# Patient Record
Sex: FEMALE | Race: WHITE
[De-identification: names, ages, dates, MRNs, and addresses within clinical notes are randomized per-mention and may not be internally consistent; named-entity substitution may affect disease eponyms.]

---

## 2023-08-01 ENCOUNTER — NON-APPOINTMENT (OUTPATIENT)
Age: 73
End: 2023-08-01

## 2023-08-03 ENCOUNTER — APPOINTMENT (OUTPATIENT)
Dept: ORTHOPEDIC SURGERY | Facility: CLINIC | Age: 73
End: 2023-08-03
Payer: MEDICARE

## 2023-08-03 ENCOUNTER — NON-APPOINTMENT (OUTPATIENT)
Age: 73
End: 2023-08-03

## 2023-08-03 VITALS
SYSTOLIC BLOOD PRESSURE: 181 MMHG | OXYGEN SATURATION: 97 % | BODY MASS INDEX: 26.12 KG/M2 | DIASTOLIC BLOOD PRESSURE: 63 MMHG | WEIGHT: 153 LBS | HEART RATE: 63 BPM | TEMPERATURE: 97.5 F | HEIGHT: 64 IN

## 2023-08-03 PROCEDURE — 99203 OFFICE O/P NEW LOW 30 MIN: CPT | Mod: 25

## 2023-08-03 PROCEDURE — 27197 CLSD TX PELVIC RING FX: CPT

## 2023-08-03 RX ORDER — MELOXICAM 15 MG/1
15 TABLET ORAL
Qty: 30 | Refills: 0 | Status: ACTIVE | COMMUNITY
Start: 2023-08-03 | End: 1900-01-01

## 2023-08-03 NOTE — PHYSICAL EXAM
[de-identified] : The patient is sitting comfortably in the exam room.  RIGHT hip -Skin is intact, no swelling, no ecchymosis -No tenderness to palpation over the greater trochanter -Painless range of motion hip -Flexion: 120, Internal rotation: 30, External rotation: 45 -No tenderness to palpation around the SI joints posteriorly, no back pain -Sensation is intact L1-S1 -5/5 EHL, FHL, TA, GS, quadriceps, hamstrings -Foot is warm and well-perfused, palpable dorsalis pedis pulse  [de-identified] : Xrays of the bilateral hips were taken at [facility] on 7/31/23. Impression: Nondisplaced fractures of the right superior and inferior pubic rami, Intact femurs bilaterally.  Electronically signed by Tommie Johnson MD 7/31/23 7:34pm

## 2023-08-03 NOTE — DISCUSSION/SUMMARY
[de-identified] : 73-year-old woman with a right inferior and superior pelvic rami fractures, approximately 2 weeks out.  -The risks and benefits of operative versus nonoperative management were discussed at length with the patient. The patient shows a good understanding of the injury and treatment options. They would like to move forward with nonoperative management.  -WBAT RLE -Meloxicam for pain -If pain persists by her next visit, we can order an CT scan of pelvis to assess fracture -Follow-up in 3 weeks with x-rays of the pelvis at that time.  -All of the patient's questions and concerns were addressed. 
general

## 2023-08-03 NOTE — HISTORY OF PRESENT ILLNESS
[de-identified] : Ms. KORIN FUENTES is a 73 year old woman presents today for initial evaluation of right pelvic pain that started on about 7/18/23. She had seen her PCP Dr. Staples who ordered x-rays on 7/31/23. She denies any injury/trauma. She notes right groin pain, worse with walking. She is ambulating with a walker since the injury.

## 2023-08-24 ENCOUNTER — APPOINTMENT (OUTPATIENT)
Dept: ORTHOPEDIC SURGERY | Facility: CLINIC | Age: 73
End: 2023-08-24
Payer: MEDICARE

## 2023-08-24 DIAGNOSIS — S32.591S OTHER SPECIFIED FRACTURE OF RIGHT PUBIS, SEQUELA: ICD-10-CM

## 2023-08-24 PROCEDURE — 99213 OFFICE O/P EST LOW 20 MIN: CPT

## 2023-08-24 PROCEDURE — 72190 X-RAY EXAM OF PELVIS: CPT

## 2023-08-24 RX ORDER — MELOXICAM 15 MG/1
15 TABLET ORAL
Qty: 30 | Refills: 0 | Status: ACTIVE | COMMUNITY
Start: 2023-08-24 | End: 1900-01-01

## 2023-08-24 NOTE — DISCUSSION/SUMMARY
[de-identified] : 73-year-old woman with a right inferior and superior pelvic rami fractures, approximately 6 weeks out, treated nonoperatively. -X-ray and physical exam findings were discussed with the patient -WBAT lateral lower extremities -Meloxicam for pain -Continue physical therapy -Follow-up in 2 months with x-rays of the pelvis at that time.  -All of the patient's questions and concerns were addressed.

## 2023-08-24 NOTE — PHYSICAL EXAM
[de-identified] : The patient is sitting comfortably in the exam room.  RIGHT hip -Skin is intact, no swelling, no ecchymosis -No tenderness to palpation over the greater trochanter -Painless range of motion hip -Flexion: 120, Internal rotation: 30, External rotation: 45 -No tenderness to palpation around the SI joints posteriorly, no back pain -Sensation is intact L1-S1 -5/5 EHL, FHL, TA, GS, quadriceps, hamstrings -Foot is warm and well-perfused, palpable dorsalis pedis pulse  [de-identified] : Xrays of the pelvis were taken in the office today, 8/24/23 AP, inlet, outlet, Judet a views.  X-rays show good overall alignment of the pelvis.  There is interval callus formation at the fracture sites on the right inferior and superior rami.  No new fractures or dislocations pubic symphysis is well aligned and bilateral sacroiliac joints are well aligned.

## 2023-08-24 NOTE — HISTORY OF PRESENT ILLNESS
[de-identified] : Ms. KORIN FUENTES is a 73 year old woman presents today for follow up evaluation of right inferior and superior pelvic rami fractures sustained on 7/18/23, being treated nonoperatively. Since her last visit she states she is feeling much better.  Her pain has significantly improved and she is ambulating with a walker.  She is able to ambulate with a cane at home.  She is very happy with her improvement.

## 2023-09-07 ENCOUNTER — INPATIENT (INPATIENT)
Facility: HOSPITAL | Age: 73
LOS: 13 days | Discharge: NURSING, REHAB OR LONG-TERM CARE | DRG: 542 | End: 2023-09-21
Attending: INTERNAL MEDICINE | Admitting: INTERNAL MEDICINE
Payer: MEDICARE

## 2023-09-07 VITALS
HEART RATE: 67 BPM | WEIGHT: 139.99 LBS | SYSTOLIC BLOOD PRESSURE: 179 MMHG | RESPIRATION RATE: 16 BRPM | TEMPERATURE: 98 F | DIASTOLIC BLOOD PRESSURE: 77 MMHG | OXYGEN SATURATION: 100 % | HEIGHT: 64 IN

## 2023-09-07 DIAGNOSIS — N17.9 ACUTE KIDNEY FAILURE, UNSPECIFIED: ICD-10-CM

## 2023-09-07 LAB
ALBUMIN SERPL ELPH-MCNC: 5 G/DL — SIGNIFICANT CHANGE UP (ref 3.3–5)
ALP SERPL-CCNC: 120 U/L — SIGNIFICANT CHANGE UP (ref 40–120)
ALT FLD-CCNC: 16 U/L — SIGNIFICANT CHANGE UP (ref 10–45)
ANION GAP SERPL CALC-SCNC: 20 MMOL/L — HIGH (ref 5–17)
AST SERPL-CCNC: 22 U/L — SIGNIFICANT CHANGE UP (ref 10–40)
BASOPHILS # BLD AUTO: 0.04 K/UL — SIGNIFICANT CHANGE UP (ref 0–0.2)
BASOPHILS NFR BLD AUTO: 0.3 % — SIGNIFICANT CHANGE UP (ref 0–2)
BILIRUB SERPL-MCNC: 0.4 MG/DL — SIGNIFICANT CHANGE UP (ref 0.2–1.2)
BUN SERPL-MCNC: 32 MG/DL — HIGH (ref 7–23)
CALCIUM SERPL-MCNC: 11.4 MG/DL — HIGH (ref 8.4–10.5)
CHLORIDE SERPL-SCNC: 90 MMOL/L — LOW (ref 96–108)
CO2 SERPL-SCNC: 19 MMOL/L — LOW (ref 22–31)
CREAT SERPL-MCNC: 1.43 MG/DL — HIGH (ref 0.5–1.3)
EGFR: 38 ML/MIN/1.73M2 — LOW
EOSINOPHIL # BLD AUTO: 0.04 K/UL — SIGNIFICANT CHANGE UP (ref 0–0.5)
EOSINOPHIL NFR BLD AUTO: 0.3 % — SIGNIFICANT CHANGE UP (ref 0–6)
GLUCOSE BLDC GLUCOMTR-MCNC: 167 MG/DL — HIGH (ref 70–99)
GLUCOSE SERPL-MCNC: 90 MG/DL — SIGNIFICANT CHANGE UP (ref 70–99)
HCT VFR BLD CALC: 36.2 % — SIGNIFICANT CHANGE UP (ref 34.5–45)
HGB BLD-MCNC: 11.8 G/DL — SIGNIFICANT CHANGE UP (ref 11.5–15.5)
IMM GRANULOCYTES NFR BLD AUTO: 0.6 % — SIGNIFICANT CHANGE UP (ref 0–0.9)
LYMPHOCYTES # BLD AUTO: 1.85 K/UL — SIGNIFICANT CHANGE UP (ref 1–3.3)
LYMPHOCYTES # BLD AUTO: 12.4 % — LOW (ref 13–44)
MCHC RBC-ENTMCNC: 30.8 PG — SIGNIFICANT CHANGE UP (ref 27–34)
MCHC RBC-ENTMCNC: 32.6 GM/DL — SIGNIFICANT CHANGE UP (ref 32–36)
MCV RBC AUTO: 94.5 FL — SIGNIFICANT CHANGE UP (ref 80–100)
MONOCYTES # BLD AUTO: 0.85 K/UL — SIGNIFICANT CHANGE UP (ref 0–0.9)
MONOCYTES NFR BLD AUTO: 5.7 % — SIGNIFICANT CHANGE UP (ref 2–14)
NEUTROPHILS # BLD AUTO: 11.99 K/UL — HIGH (ref 1.8–7.4)
NEUTROPHILS NFR BLD AUTO: 80.7 % — HIGH (ref 43–77)
NRBC # BLD: 0 /100 WBCS — SIGNIFICANT CHANGE UP (ref 0–0)
PLATELET # BLD AUTO: 429 K/UL — HIGH (ref 150–400)
POTASSIUM SERPL-MCNC: 3.9 MMOL/L — SIGNIFICANT CHANGE UP (ref 3.5–5.3)
POTASSIUM SERPL-SCNC: 3.9 MMOL/L — SIGNIFICANT CHANGE UP (ref 3.5–5.3)
PROT SERPL-MCNC: 8.6 G/DL — HIGH (ref 6–8.3)
RBC # BLD: 3.83 M/UL — SIGNIFICANT CHANGE UP (ref 3.8–5.2)
RBC # FLD: 15 % — HIGH (ref 10.3–14.5)
SODIUM SERPL-SCNC: 129 MMOL/L — LOW (ref 135–145)
WBC # BLD: 14.86 K/UL — HIGH (ref 3.8–10.5)
WBC # FLD AUTO: 14.86 K/UL — HIGH (ref 3.8–10.5)

## 2023-09-07 PROCEDURE — 74177 CT ABD & PELVIS W/CONTRAST: CPT | Mod: 26,MA

## 2023-09-07 PROCEDURE — 72131 CT LUMBAR SPINE W/O DYE: CPT | Mod: 26,MA

## 2023-09-07 PROCEDURE — 71045 X-RAY EXAM CHEST 1 VIEW: CPT | Mod: 26

## 2023-09-07 PROCEDURE — 99285 EMERGENCY DEPT VISIT HI MDM: CPT | Mod: FS

## 2023-09-07 RX ORDER — LIDOCAINE 4 G/100G
1 CREAM TOPICAL ONCE
Refills: 0 | Status: COMPLETED | OUTPATIENT
Start: 2023-09-07 | End: 2023-09-07

## 2023-09-07 RX ORDER — INSULIN LISPRO 100/ML
VIAL (ML) SUBCUTANEOUS AT BEDTIME
Refills: 0 | Status: DISCONTINUED | OUTPATIENT
Start: 2023-09-07 | End: 2023-09-21

## 2023-09-07 RX ORDER — ACETAMINOPHEN 500 MG
650 TABLET ORAL EVERY 6 HOURS
Refills: 0 | Status: DISCONTINUED | OUTPATIENT
Start: 2023-09-07 | End: 2023-09-21

## 2023-09-07 RX ORDER — SODIUM CHLORIDE 9 MG/ML
1000 INJECTION, SOLUTION INTRAVENOUS
Refills: 0 | Status: DISCONTINUED | OUTPATIENT
Start: 2023-09-07 | End: 2023-09-21

## 2023-09-07 RX ORDER — SPIRONOLACTONE 25 MG/1
50 TABLET, FILM COATED ORAL DAILY
Refills: 0 | Status: DISCONTINUED | OUTPATIENT
Start: 2023-09-07 | End: 2023-09-11

## 2023-09-07 RX ORDER — DEXTROSE 50 % IN WATER 50 %
25 SYRINGE (ML) INTRAVENOUS ONCE
Refills: 0 | Status: DISCONTINUED | OUTPATIENT
Start: 2023-09-07 | End: 2023-09-21

## 2023-09-07 RX ORDER — LEVOTHYROXINE SODIUM 125 MCG
75 TABLET ORAL
Refills: 0 | Status: DISCONTINUED | OUTPATIENT
Start: 2023-09-07 | End: 2023-09-21

## 2023-09-07 RX ORDER — ENOXAPARIN SODIUM 100 MG/ML
40 INJECTION SUBCUTANEOUS EVERY 24 HOURS
Refills: 0 | Status: DISCONTINUED | OUTPATIENT
Start: 2023-09-07 | End: 2023-09-11

## 2023-09-07 RX ORDER — ASPIRIN/CALCIUM CARB/MAGNESIUM 324 MG
325 TABLET ORAL DAILY
Refills: 0 | Status: DISCONTINUED | OUTPATIENT
Start: 2023-09-07 | End: 2023-09-21

## 2023-09-07 RX ORDER — ALLOPURINOL 300 MG
300 TABLET ORAL DAILY
Refills: 0 | Status: DISCONTINUED | OUTPATIENT
Start: 2023-09-07 | End: 2023-09-21

## 2023-09-07 RX ORDER — ACETAMINOPHEN 500 MG
975 TABLET ORAL ONCE
Refills: 0 | Status: COMPLETED | OUTPATIENT
Start: 2023-09-07 | End: 2023-09-07

## 2023-09-07 RX ORDER — OXYCODONE AND ACETAMINOPHEN 5; 325 MG/1; MG/1
1 TABLET ORAL EVERY 6 HOURS
Refills: 0 | Status: DISCONTINUED | OUTPATIENT
Start: 2023-09-07 | End: 2023-09-12

## 2023-09-07 RX ORDER — GLUCAGON INJECTION, SOLUTION 0.5 MG/.1ML
1 INJECTION, SOLUTION SUBCUTANEOUS ONCE
Refills: 0 | Status: DISCONTINUED | OUTPATIENT
Start: 2023-09-07 | End: 2023-09-21

## 2023-09-07 RX ORDER — DEXTROSE 50 % IN WATER 50 %
15 SYRINGE (ML) INTRAVENOUS ONCE
Refills: 0 | Status: DISCONTINUED | OUTPATIENT
Start: 2023-09-07 | End: 2023-09-21

## 2023-09-07 RX ORDER — LEVOTHYROXINE SODIUM 125 MCG
50 TABLET ORAL
Refills: 0 | Status: DISCONTINUED | OUTPATIENT
Start: 2023-09-07 | End: 2023-09-21

## 2023-09-07 RX ORDER — DEXTROSE 50 % IN WATER 50 %
12.5 SYRINGE (ML) INTRAVENOUS ONCE
Refills: 0 | Status: DISCONTINUED | OUTPATIENT
Start: 2023-09-07 | End: 2023-09-21

## 2023-09-07 RX ORDER — SODIUM CHLORIDE 9 MG/ML
1000 INJECTION INTRAMUSCULAR; INTRAVENOUS; SUBCUTANEOUS ONCE
Refills: 0 | Status: COMPLETED | OUTPATIENT
Start: 2023-09-07 | End: 2023-09-07

## 2023-09-07 RX ORDER — METOPROLOL TARTRATE 50 MG
25 TABLET ORAL AT BEDTIME
Refills: 0 | Status: DISCONTINUED | OUTPATIENT
Start: 2023-09-07 | End: 2023-09-21

## 2023-09-07 RX ORDER — ATORVASTATIN CALCIUM 80 MG/1
40 TABLET, FILM COATED ORAL AT BEDTIME
Refills: 0 | Status: DISCONTINUED | OUTPATIENT
Start: 2023-09-07 | End: 2023-09-21

## 2023-09-07 RX ORDER — AMLODIPINE BESYLATE 2.5 MG/1
5 TABLET ORAL DAILY
Refills: 0 | Status: DISCONTINUED | OUTPATIENT
Start: 2023-09-07 | End: 2023-09-20

## 2023-09-07 RX ORDER — FUROSEMIDE 40 MG
40 TABLET ORAL DAILY
Refills: 0 | Status: DISCONTINUED | OUTPATIENT
Start: 2023-09-07 | End: 2023-09-11

## 2023-09-07 RX ORDER — INSULIN LISPRO 100/ML
VIAL (ML) SUBCUTANEOUS
Refills: 0 | Status: DISCONTINUED | OUTPATIENT
Start: 2023-09-07 | End: 2023-09-21

## 2023-09-07 RX ADMIN — Medication 975 MILLIGRAM(S): at 17:16

## 2023-09-07 RX ADMIN — SODIUM CHLORIDE 1000 MILLILITER(S): 9 INJECTION INTRAMUSCULAR; INTRAVENOUS; SUBCUTANEOUS at 21:47

## 2023-09-07 RX ADMIN — LIDOCAINE 1 PATCH: 4 CREAM TOPICAL at 17:15

## 2023-09-07 NOTE — ED ADULT TRIAGE NOTE - NS ED NURSE AMBULANCES
[facility]

## 2023-09-07 NOTE — H&P ADULT - NSHPLABSRESULTS_GEN_ALL_CORE
11.8   14.86 )-----------( 429      ( 07 Sep 2023 17:25 )             36.2       09-07    129<L>  |  90<L>  |  32<H>  ----------------------------<  90  3.9   |  19<L>  |  1.43<H>    Ca    11.4<H>      07 Sep 2023 17:25    TPro  8.6<H>  /  Alb  5.0  /  TBili  0.4  /  DBili  x   /  AST  22  /  ALT  16  /  AlkPhos  120  09-07              Urinalysis Basic - ( 07 Sep 2023 17:25 )    Color: x / Appearance: x / SG: x / pH: x  Gluc: 90 mg/dL / Ketone: x  / Bili: x / Urobili: x   Blood: x / Protein: x / Nitrite: x   Leuk Esterase: x / RBC: x / WBC x   Sq Epi: x / Non Sq Epi: x / Bacteria: x    IMPRESSION:   No vertebral fracture is recognized. Degenerative changes   as above.    < end of copied text >    IMPRESSION:    No acute findings to explain patient's lower back pain.  Please see dedicated CT lumbar spine report for more sensitive evaluation   of the lumbar spine.    < end of copied text >

## 2023-09-07 NOTE — ED CLERICAL - DIVISION
[facility]... [facility]... [facility]...

## 2023-09-07 NOTE — ED ADULT NURSE NOTE - NSFALLRISKINTERV_ED_ALL_ED
Assistance OOB with selected safe patient handling equipment if applicable/Assistance with ambulation/Communicate fall risk and risk factors to all staff, patient, and family/Monitor gait and stability/Provide patient with walking aids/Provide visual cue: yellow wristband, yellow gown, etc/Reinforce activity limits and safety measures with patient and family/Call bell, personal items and telephone in reach/Instruct patient to call for assistance before getting out of bed/chair/stretcher/Non-slip footwear applied when patient is off stretcher/[place] to call system/Physically safe environment - no spills, clutter or unnecessary equipment/Purposeful Proactive Rounding/Room/bathroom lighting operational, light cord in reach Assistance OOB with selected safe patient handling equipment if applicable/Assistance with ambulation/Communicate fall risk and risk factors to all staff, patient, and family/Monitor gait and stability/Provide patient with walking aids/Provide visual cue: yellow wristband, yellow gown, etc/Reinforce activity limits and safety measures with patient and family/Call bell, personal items and telephone in reach/Instruct patient to call for assistance before getting out of bed/chair/stretcher/Non-slip footwear applied when patient is off stretcher/[place] to call system/Physically safe environment - no spills, clutter or unnecessary equipment/Purposeful Proactive Rounding/Room/bathroom lighting operational, light cord in reach Assistance OOB with selected safe patient handling equipment if applicable/Assistance with ambulation/Communicate fall risk and risk factors to all staff, patient, and family/Monitor gait and stability/Provide patient with walking aids/Provide visual cue: yellow wristband, yellow gown, etc/Reinforce activity limits and safety measures with patient and family/Call bell, personal items and telephone in reach/Instruct patient to call for assistance before getting out of bed/chair/stretcher/Non-slip footwear applied when patient is off stretcher/[place] to call system/Physically safe environment - no spills, clutter or unnecessary equipment/Purposeful Proactive Rounding/Room/bathroom lighting operational, light cord in reach

## 2023-09-07 NOTE — H&P ADULT - NSHPPHYSICALEXAM_GEN_ALL_CORE
PHYSICAL EXAMINATION:  Vital Signs Last 24 Hrs  T(C): 36.4 (07 Sep 2023 20:13), Max: 36.9 (07 Sep 2023 14:39)  T(F): 97.6 (07 Sep 2023 20:13), Max: 98.4 (07 Sep 2023 14:39)  HR: 78 (07 Sep 2023 20:13) (67 - 78)  BP: 137/61 (07 Sep 2023 20:13) (137/61 - 179/77)  BP(mean): --  RR: 18 (07 Sep 2023 20:13) (16 - 18)  SpO2: 98% (07 Sep 2023 20:13) (98% - 100%)    Parameters below as of 07 Sep 2023 20:13  Patient On (Oxygen Delivery Method): room air      CAPILLARY BLOOD GLUCOSE          GENERAL: NAD, well-groomed, well-developed  HEAD:  atraumatic, normocephalic  EYES: sclera anicteric  ENMT: mucous membranes moist  NECK: supple, No JVD  CHEST/LUNG: clear to auscultation bilaterally; no rales, rhonchi, or wheezing b/l  HEART: normal S1, S2  ABDOMEN: BS+, soft, ND, NT  R flank tender  EXTREMITIES:  pulses palpable; no clubbing, cyanosis, or edema b/l LEs  NEURO: awake, alert, interactive; moves all extremities  SKIN: no rashes or lesions

## 2023-09-07 NOTE — ED ADULT NURSE NOTE - OBJECTIVE STATEMENT
73 year old female pt presented to the ED co right lower back pain, pt states she was diagnosed with pelvic fracture x 2 weeks ago atraumatic, past few days pt co back pain denies any trauma to site, pt ambulates with a cane and walker as need, pt denies any trauma to back no heavy lifting denies taking any pain ,medication for relief, pt denies any fever chills no urinary symptoms denies any numbness or tingling

## 2023-09-07 NOTE — ED PROVIDER NOTE - CLINICAL SUMMARY MEDICAL DECISION MAKING FREE TEXT BOX
Attending note (Nathanael): 73 female presenting with atraumatic right lower back pain with tenderness elicited at approximately L5 no CVA tenderness and no paraspinal tenderness no SI joint tenderness.  Given patient has recent atraumatic pelvic fracture and now has new pain raises concern for pathologic fracture of lumbar spine or additional fracture of pelvis.  Will obtain CT abdomen pelvis with recons of the lumbar spine as well as screening labs including CBC (evaluate for leukocytosis or anemia), CMP (evaluate for electrolyte abnormalities or renal/liver dysfunction, especially evaluate for calcium level), and urinalysis (evaluate for urinary tract infection, consider urine culture).  Start Tylenol and lidocaine patch for pain control, escalate pain medication as needed the patient appears comfortable on initial evaluation.  Disposition pending review of labs and imaging.

## 2023-09-07 NOTE — H&P ADULT - NSHPSOCIALHISTORY_GEN_ALL_CORE
Social History:    Marital Status:  (   )    (   ) Single    (   )    (x  )   Occupation:   Lives with: (x ) alone  (  ) children   (  ) spouse   (  ) parents  (  ) other    Substance Use (street drugs): ( x ) never used  (  ) other:  Tobacco Usage:  (  x ) never smoked   (   ) former smoker   (   ) current smoker  (     ) pack years  (        ) last cigarette date  Alcohol Usage: no     (     ) Advanced Directives: (     ) None    (      ) DNR    (     ) DNI    (     ) Health Care Proxy:

## 2023-09-07 NOTE — H&P ADULT - HISTORY OF PRESENT ILLNESS
73-year-old female with history of HTN, CAD and unclear open heart surgery (per patient may be CABG, on  daily), DM, hypothyroid, and recent atraumatic right pelvic fracture (unclear exact fracture per patient but seems to indicate pubic rami) who presents with worsening right lower back pain which began approximately 4 to 5 days ago.  Patient states pain was mild initially at onset has been constant worsening since onset and denies any trauma or specific eliciting event.  No fever.  No numbness or weakness in extremities.  No loss of control of bowels or bladder.  No urinary symptoms no diarrhea.  No abdominal pain.  No nausea or vomiting. Further history from patient had right pelvic fracture which is likely his pubic rami though have no imaging in EMR and unclear history per patient.  Per her history no trauma or falls.

## 2023-09-07 NOTE — H&P ADULT - NSHPREVIEWOFSYSTEMS_GEN_ALL_CORE
REVIEW OF SYSTEMS:    CONSTITUTIONAL: + weakness, fevers or chills  EYES/ENT: No visual changes;  No vertigo or throat pain   NECK: No pain or stiffness  RESPIRATORY: No cough, wheezing, hemoptysis; No shortness of breath  CARDIOVASCULAR: No chest pain or palpitations  GASTROINTESTINAL: No abdominal or epigastric pain. No nausea, vomiting, or hematemesis; No diarrhea or constipation. No melena or hematochezia.  GENITOURINARY: No dysuria, frequency or hematuria R flank pain  NEUROLOGICAL: No numbness or weakness  SKIN: No itching, burning, rashes, or lesions   All other review of systems is negative unless indicated above.

## 2023-09-07 NOTE — ED ADULT TRIAGE NOTE - INTERNATIONAL TRAVEL
“You can access the FollowHealth Patient Portal, offered by [facility], by registering with the following website: [email or web address]”
No

## 2023-09-07 NOTE — ED PROVIDER NOTE - NS ED ROS FT
Review of Systems:  -General: no fever   -ENT: no congestion  -Pulmonary: no cough, no shortness of breath  -Cardiac: no chest pain  -Gastrointestinal: no abdominal pain, no nausea, no vomiting, and no diarrhea.  -Genitourinary: no blood or pain with urination  -Musculoskeletal: + Back pain  -Skin: no rashes  -Endocrine: + H/o diabetes  -Neurologic: No new weakness or numbness in extremities    All else negative unless otherwise specified elsewhere in this note.

## 2023-09-07 NOTE — ED PROVIDER NOTE - ATTENDING APP SHARED VISIT CONTRIBUTION OF CARE
Attending Statement (ALIVIA Huffman MD):    HPI: 73-year-old female with history of HTN, CAD and unclear open heart surgery (per patient may be CABG, on  daily), DM, hypothyroid, and recent atraumatic right pelvic fracture (unclear exact fracture per patient but seems to indicate pubic rami) who presents with worsening right lower back pain which began approximately 4 to 5 days ago.  Patient states pain was mild initially at onset has been constant worsening since onset and denies any trauma or specific eliciting event.  No fever.  No numbness or weakness in extremities.  No loss of control of bowels or bladder.  No urinary symptoms no diarrhea.  No abdominal pain.  No nausea or vomiting.    Further history from patient had right pelvic fracture which is likely his pubic rami though have no imaging in EMR and unclear history per patient.  Per her history no trauma or falls.    Review of Systems:  -General: no fever   -ENT: no congestion  -Pulmonary: no cough, no shortness of breath  -Cardiac: no chest pain  -Gastrointestinal: no abdominal pain, no nausea, no vomiting, and no diarrhea.  -Genitourinary: no blood or pain with urination  -Musculoskeletal: + Back pain  -Skin: no rashes  -Endocrine: + H/o diabetes  -Neurologic: No new weakness or numbness in extremities    All else negative unless otherwise specified elsewhere in this note.    On Physical Exam:  General: well appearing, in NAD, speaking clearly in full sentences and without difficulty; cooperative with exam  HEENT: anicteric sclera, airway patent  Neck: no JVD  Cardiac: regular, s1 s2  Lungs: CTABL  Abdomen: soft nontender/nondistended  Back: No thoracic spine tenderness no upper lumbar tenderness.  no CVA tenderness and no paraspinal tenderness no SI joint tenderness; had point tenderness at approximately L5  : no bladder tenderness or distension  Skin: intact, no rash  Extremities: no peripheral edema, no gross deformities  Neuro: Moving all extremities.  Grossly 5 out of 5 strength in hips knees and ankles bilaterally.  No gross regions of sensation loss in lower extremities.  No saddle anesthesia noted/reported.  No rigidity clonus or tremor.    MDM: 73 female presenting with atraumatic right lower back pain with tenderness elicited at approximately L5 no CVA tenderness and no paraspinal tenderness no SI joint tenderness.  Given patient has recent atraumatic pelvic fracture and now has new pain raises concern for pathologic fracture of lumbar spine or additional fracture of pelvis.  Will obtain CT abdomen pelvis with recons of the lumbar spine as well as screening labs including CBC (evaluate for leukocytosis or anemia), CMP (evaluate for electrolyte abnormalities or renal/liver dysfunction, especially evaluate for calcium level), and urinalysis (evaluate for urinary tract infection, consider urine culture).  Start Tylenol and lidocaine patch for pain control, escalate pain medication as needed the patient appears comfortable on initial evaluation.  Disposition pending review of labs and imaging.

## 2023-09-07 NOTE — H&P ADULT - NSICDXPASTMEDICALHX_GEN_ALL_CORE_FT
PAST MEDICAL HISTORY:  CAD (coronary artery disease)     Diabetes mellitus     H/O pericardiotomy     History of gout     Hyperlipidemia     Hypertension     Hypothyroidism

## 2023-09-07 NOTE — ED PROVIDER NOTE - PHYSICAL EXAMINATION
On Physical Exam:  General: well appearing, in NAD, speaking clearly in full sentences and without difficulty; cooperative with exam  HEENT: anicteric sclera, airway patent  Neck: no JVD  Cardiac: regular, s1 s2  Lungs: CTABL  Abdomen: soft nontender/nondistended  Back: No thoracic spine tenderness no upper lumbar tenderness.  no CVA tenderness and no paraspinal tenderness no SI joint tenderness; had point tenderness at approximately L5  : no bladder tenderness or distension  Skin: intact, no rash  Extremities: no peripheral edema, no gross deformities  Neuro: Moving all extremities.  Grossly 5 out of 5 strength in hips knees and ankles bilaterally.  No gross regions of sensation loss in lower extremities.  No saddle anesthesia noted/reported.  No rigidity clonus or tremor.

## 2023-09-07 NOTE — ED ADULT TRIAGE NOTE - PAIN: PRESENCE, MLM
CLINICAL PHARMACY NOTE: MEDS TO [address] Select Patient?: Yes  Total # of Prescriptions Filled: 5   The following medications were delivered to the patient:  Venlafaxine 100mg  Triple Antibiotic Ointment  Sulfamethoxazole-Trimethoprim  Acyclovir 200mg  Narcan 4mg  Total # of Interventions Completed: 2  Time Spent (min): 30    Additional Documentation: complains of pain/discomfort

## 2023-09-07 NOTE — H&P ADULT - ASSESSMENT
73 f with    Back pain  - pain control  - MRI LS spine  - PT  - UA pending     Hypercalcemia  - follow  - PTH  - Vit D    CAD (coronary artery disease)   - stable  - Echo  - Cardiology evaluation dr Diego    Diabetes mellitus   - ADA diet  - BS control    H/O pericardiotomy   - Echo    History of gout   - continue Rx  - Uric acid    Hyperlipidemia   - stable    Hypertension   - control    Hypothyroidism   - supplement  - follow TSH    DVT prophylaxis    Further action as per clinical course     Guido Rolon MD phone [phone]      73 f with    Back pain  - pain control  - MRI LS spine  - PT  - UA pending     Hypercalcemia  - follow  - PTH  - Vit D    CAD (coronary artery disease)   - stable  - Echo  - Cardiology evaluation dr Diego    Diabetes mellitus   - ADA diet  - BS control    H/O pericardiotomy   - Echo    History of gout   - continue Rx  - Uric acid    Hyperlipidemia   - stable    Hypertension   - control    Hypothyroidism   - supplement  - follow TSH    DVT prophylaxis    Further action as per clinical course     Guido Rolon MD phone [phone]      73 f with    Back pain  - pain control  - MRI LS spine  - PT  - UA pending     Hypercalcemia  - follow  - PTH  - Vit D    CAD (coronary artery disease)   - stable  - Echo  - Cardiology evaluation dr Diego    Diabetes mellitus   - ADA diet  - BS control    H/O pericardiotomy   - Echo    History of gout   - continue Rx  - Uric acid    Hyperlipidemia   - stable    Hypertension   - control    Hypothyroidism   - supplement  - follow TSH    DVT prophylaxis    Further action as per clinical course     Guido Rolon MD phone [phone]

## 2023-09-07 NOTE — ED PROVIDER NOTE - OBJECTIVE STATEMENT
Attending Statement (ALIVIA Huffman MD): 73-year-old female with history of HTN, CAD and unclear open heart surgery (per patient may be CABG, on  daily), DM, hypothyroid, and recent atraumatic right pelvic fracture (unclear exact fracture per patient but seems to indicate pubic rami) who presents with worsening right lower back pain which began approximately 4 to 5 days ago.  Patient states pain was mild initially at onset has been constant worsening since onset and denies any trauma or specific eliciting event.  No fever.  No numbness or weakness in extremities.  No loss of control of bowels or bladder.  No urinary symptoms no diarrhea.  No abdominal pain.  No nausea or vomiting.    Further history from patient had right pelvic fracture which is likely his pubic rami though have no imaging in EMR and unclear history per patient.  Per her history no trauma or falls.

## 2023-09-08 LAB
A1C WITH ESTIMATED AVERAGE GLUCOSE RESULT: 6 % — HIGH (ref 4–5.6)
ALBUMIN SERPL ELPH-MCNC: 4 G/DL — SIGNIFICANT CHANGE UP (ref 3.3–5)
ALP SERPL-CCNC: 100 U/L — SIGNIFICANT CHANGE UP (ref 40–120)
ALT FLD-CCNC: 12 U/L — SIGNIFICANT CHANGE UP (ref 10–45)
ANION GAP SERPL CALC-SCNC: 16 MMOL/L — SIGNIFICANT CHANGE UP (ref 5–17)
AST SERPL-CCNC: 18 U/L — SIGNIFICANT CHANGE UP (ref 10–40)
BILIRUB SERPL-MCNC: 0.4 MG/DL — SIGNIFICANT CHANGE UP (ref 0.2–1.2)
BUN SERPL-MCNC: 32 MG/DL — HIGH (ref 7–23)
CALCIUM SERPL-MCNC: 9.7 MG/DL — SIGNIFICANT CHANGE UP (ref 8.4–10.5)
CALCIUM SERPL-MCNC: 9.9 MG/DL — SIGNIFICANT CHANGE UP (ref 8.4–10.5)
CHLORIDE SERPL-SCNC: 96 MMOL/L — SIGNIFICANT CHANGE UP (ref 96–108)
CO2 SERPL-SCNC: 19 MMOL/L — LOW (ref 22–31)
CREAT SERPL-MCNC: 1.32 MG/DL — HIGH (ref 0.5–1.3)
CRP SERPL-MCNC: <3 MG/L — SIGNIFICANT CHANGE UP (ref 0–4)
EGFR: 42 ML/MIN/1.73M2 — LOW
ESTIMATED AVERAGE GLUCOSE: 126 MG/DL — HIGH (ref 68–114)
GLUCOSE BLDC GLUCOMTR-MCNC: 141 MG/DL — HIGH (ref 70–99)
GLUCOSE BLDC GLUCOMTR-MCNC: 164 MG/DL — HIGH (ref 70–99)
GLUCOSE BLDC GLUCOMTR-MCNC: 171 MG/DL — HIGH (ref 70–99)
GLUCOSE BLDC GLUCOMTR-MCNC: 182 MG/DL — HIGH (ref 70–99)
GLUCOSE SERPL-MCNC: 96 MG/DL — SIGNIFICANT CHANGE UP (ref 70–99)
HCT VFR BLD CALC: 32.9 % — LOW (ref 34.5–45)
HCV AB S/CO SERPL IA: 0.05 S/CO — SIGNIFICANT CHANGE UP (ref 0–0.99)
HCV AB SERPL-IMP: SIGNIFICANT CHANGE UP
HGB BLD-MCNC: 10.8 G/DL — LOW (ref 11.5–15.5)
MCHC RBC-ENTMCNC: 31.1 PG — SIGNIFICANT CHANGE UP (ref 27–34)
MCHC RBC-ENTMCNC: 32.8 GM/DL — SIGNIFICANT CHANGE UP (ref 32–36)
MCV RBC AUTO: 94.8 FL — SIGNIFICANT CHANGE UP (ref 80–100)
NRBC # BLD: 0 /100 WBCS — SIGNIFICANT CHANGE UP (ref 0–0)
PLATELET # BLD AUTO: 363 K/UL — SIGNIFICANT CHANGE UP (ref 150–400)
POTASSIUM SERPL-MCNC: 4.3 MMOL/L — SIGNIFICANT CHANGE UP (ref 3.5–5.3)
POTASSIUM SERPL-SCNC: 4.3 MMOL/L — SIGNIFICANT CHANGE UP (ref 3.5–5.3)
PROT SERPL-MCNC: 7 G/DL — SIGNIFICANT CHANGE UP (ref 6–8.3)
PTH-INTACT FLD-MCNC: 35 PG/ML — SIGNIFICANT CHANGE UP (ref 15–65)
RBC # BLD: 3.47 M/UL — LOW (ref 3.8–5.2)
RBC # FLD: 15 % — HIGH (ref 10.3–14.5)
SODIUM SERPL-SCNC: 131 MMOL/L — LOW (ref 135–145)
TSH SERPL-MCNC: 3.78 UIU/ML — SIGNIFICANT CHANGE UP (ref 0.27–4.2)
URATE SERPL-MCNC: 4.4 MG/DL — SIGNIFICANT CHANGE UP (ref 2.5–7)
WBC # BLD: 10.8 K/UL — HIGH (ref 3.8–10.5)
WBC # FLD AUTO: 10.8 K/UL — HIGH (ref 3.8–10.5)

## 2023-09-08 PROCEDURE — 93306 TTE W/DOPPLER COMPLETE: CPT | Mod: 26

## 2023-09-08 RX ORDER — ONDANSETRON 8 MG/1
4 TABLET, FILM COATED ORAL ONCE
Refills: 0 | Status: COMPLETED | OUTPATIENT
Start: 2023-09-08 | End: 2023-09-09

## 2023-09-08 RX ADMIN — ATORVASTATIN CALCIUM 40 MILLIGRAM(S): 80 TABLET, FILM COATED ORAL at 21:17

## 2023-09-08 RX ADMIN — Medication 650 MILLIGRAM(S): at 02:39

## 2023-09-08 RX ADMIN — Medication 2: at 11:21

## 2023-09-08 RX ADMIN — AMLODIPINE BESYLATE 5 MILLIGRAM(S): 2.5 TABLET ORAL at 06:00

## 2023-09-08 RX ADMIN — Medication 650 MILLIGRAM(S): at 01:20

## 2023-09-08 RX ADMIN — Medication 75 MICROGRAM(S): at 09:08

## 2023-09-08 RX ADMIN — Medication 325 MILLIGRAM(S): at 11:22

## 2023-09-08 RX ADMIN — OXYCODONE AND ACETAMINOPHEN 1 TABLET(S): 5; 325 TABLET ORAL at 22:30

## 2023-09-08 RX ADMIN — SPIRONOLACTONE 50 MILLIGRAM(S): 25 TABLET, FILM COATED ORAL at 05:59

## 2023-09-08 RX ADMIN — Medication 2: at 09:08

## 2023-09-08 RX ADMIN — ENOXAPARIN SODIUM 40 MILLIGRAM(S): 100 INJECTION SUBCUTANEOUS at 11:21

## 2023-09-08 RX ADMIN — OXYCODONE AND ACETAMINOPHEN 1 TABLET(S): 5; 325 TABLET ORAL at 21:17

## 2023-09-08 RX ADMIN — Medication 300 MILLIGRAM(S): at 11:22

## 2023-09-08 RX ADMIN — ATORVASTATIN CALCIUM 40 MILLIGRAM(S): 80 TABLET, FILM COATED ORAL at 00:20

## 2023-09-08 RX ADMIN — Medication 25 MILLIGRAM(S): at 21:17

## 2023-09-08 RX ADMIN — Medication 40 MILLIGRAM(S): at 05:59

## 2023-09-08 RX ADMIN — Medication 25 MILLIGRAM(S): at 00:20

## 2023-09-08 NOTE — PROGRESS NOTE ADULT - ASSESSMENT
73 f with    Back pain  - pain control  - MRI LS spine pending   - PT  - UA pending   - Pelvis xray  - Ortho called. Recommends OTP follow.     Hypercalcemia  - follow  - PTH  - Vit D    CAD (coronary artery disease)   - stable  - Echo  - Cardiology evaluation dr Diego    Diabetes mellitus   - ADA diet  - BS control    H/O pericardiotomy   - Echo    History of gout   - continue Rx  - Uric acid    Hyperlipidemia   - stable    Hypertension   - control    Hypothyroidism   - supplement  - follow TSH    DVT prophylaxis     Guido Rolon MD phone [phone]      73 f with    Back pain  - pain control  - MRI LS spine pending   - PT  - UA pending   - Pelvis xray  - Ortho called. Recommends OTP follow.     Hypercalcemia  - follow  - PTH  - Vit D    CAD (coronary artery disease)   - stable  - Echo  - Cardiology evaluation dr Diego    Diabetes mellitus   - ADA diet  - BS control    H/O pericardiotomy   - Echo    History of gout   - continue Rx  - Uric acid    Hyperlipidemia   - stable    Hypertension   - control    Hypothyroidism   - supplement  - follow TSH    DVT prophylaxis     Guido Rolon MD phone [phone]      73 f with    Back pain  - pain control  - MRI LS spine pending   - PT  - UA pending   - Pelvis xray  - Ortho called. Recommends OTP follow.     Hypercalcemia  - follow  - PTH  - Vit D    CAD (coronary artery disease)   - stable  - Echo  - Cardiology evaluation dr Diego    Diabetes mellitus   - ADA diet  - BS control    H/O pericardiotomy   - Echo    History of gout   - continue Rx  - Uric acid    Hyperlipidemia   - stable    Hypertension   - control    Hypothyroidism   - supplement  - follow TSH    DVT prophylaxis     Guido Rolon MD phone [phone]

## 2023-09-08 NOTE — CONSULT NOTE ADULT - SUBJECTIVE AND OBJECTIVE BOX
CHIEF COMPLAINT: back pain    HISTORY OF PRESENT ILLNESS:  73-year-old female with history of HTN, CAD, carotid artery disease, DM, hypothyroid, and recent atraumatic right pelvic fracture (unclear exact fracture per patient but seems to indicate pubic rami) who presents with worsening right lower back pain which began approximately 4 to 5 days ago.  Patient states pain was mild initially at onset has been constant worsening since onset and denies any trauma or specific eliciting event.  No fever.  No numbness or weakness in extremities.  No loss of control of bowels or bladder.  No urinary symptoms no diarrhea.  No abdominal pain.  No nausea or vomiting. Further history from patient had right pelvic fracture which is likely his pubic rami though have no imaging in EMR and unclear history per patient.  Per her history no trauma or falls.      Allergies    penicillin (Unknown)    Intolerances    	    MEDICATIONS:  amLODIPine   Tablet 5 milliGRAM(s) Oral daily  enoxaparin Injectable 40 milliGRAM(s) SubCutaneous every 24 hours  furosemide    Tablet 40 milliGRAM(s) Oral daily  metoprolol tartrate 25 milliGRAM(s) Oral at bedtime  spironolactone 50 milliGRAM(s) Oral daily        acetaminophen     Tablet .. 650 milliGRAM(s) Oral every 6 hours PRN  aspirin 325 milliGRAM(s) Oral daily  oxycodone    5 mG/acetaminophen 325 mG 1 Tablet(s) Oral every 6 hours PRN      allopurinol 300 milliGRAM(s) Oral daily  atorvastatin 40 milliGRAM(s) Oral at bedtime  dextrose 50% Injectable 25 Gram(s) IV Push once  dextrose 50% Injectable 12.5 Gram(s) IV Push once  dextrose 50% Injectable 25 Gram(s) IV Push once  dextrose Oral Gel 15 Gram(s) Oral once PRN  glucagon  Injectable 1 milliGRAM(s) IntraMuscular once  insulin lispro (ADMELOG) corrective regimen sliding scale   SubCutaneous three times a day before meals  insulin lispro (ADMELOG) corrective regimen sliding scale   SubCutaneous at bedtime  levothyroxine 50 MICROGram(s) Oral <User Schedule>  levothyroxine 75 MICROGram(s) Oral <User Schedule>    dextrose 5%. 1000 milliLiter(s) IV Continuous <Continuous>  dextrose 5%. 1000 milliLiter(s) IV Continuous <Continuous>      PAST MEDICAL & SURGICAL HISTORY:  Diabetes mellitus      CAD (coronary artery disease)      H/O pericardiotomy      History of gout      Hypertension      Hypothyroidism      Hyperlipidemia          FAMILY HISTORY:      SOCIAL HISTORY:    non smoker. indep in adl      REVIEW OF SYSTEMS:  See HPI, otherwise complete 10 point review of systems negative    [ ] All others negative	      PHYSICAL EXAM:  T(C): 36.5 (09-08-23 @ 04:53), Max: 36.9 (09-07-23 @ 14:39)  HR: 55 (09-08-23 @ 04:53) (55 - 78)  BP: 130/69 (09-08-23 @ 04:53) (130/69 - 179/77)  RR: 18 (09-08-23 @ 04:53) (16 - 18)  SpO2: 98% (09-08-23 @ 04:53) (97% - 100%)  Wt(kg): --  I&O's Summary      Appearance: No Acute Distress	  HEENT:  Normal oral mucosa, PERRL, EOMI	  Cardiovascular: Normal S1 S2, No JVD, No murmurs/rubs/gallops  Respiratory: Lungs clear to auscultation bilaterally  Gastrointestinal:  Soft, Non-tender, + BS	  Skin: No rashes, No ecchymoses, No cyanosis	  Neurologic: Non-focal  Extremities: No clubbing, cyanosis or edema  Vascular: Peripheral pulses palpable 2+ bilaterally  Psychiatry: A & O x 3, Mood & affect appropriate    Laboratory Data:	 	    CBC Full  -  ( 08 Sep 2023 06:58 )  WBC Count : 10.80 K/uL  Hemoglobin : 10.8 g/dL  Hematocrit : 32.9 %  Platelet Count - Automated : 363 K/uL  Mean Cell Volume : 94.8 fl  Mean Cell Hemoglobin : 31.1 pg  Mean Cell Hemoglobin Concentration : 32.8 gm/dL  Auto Neutrophil # : x  Auto Lymphocyte # : x  Auto Monocyte # : x  Auto Eosinophil # : x  Auto Basophil # : x  Auto Neutrophil % : x  Auto Lymphocyte % : x  Auto Monocyte % : x  Auto Eosinophil % : x  Auto Basophil % : x    09-07    129<L>  |  90<L>  |  32<H>  ----------------------------<  90  3.9   |  19<L>  |  1.43<H>    Ca    11.4<H>      07 Sep 2023 17:25    TPro  8.6<H>  /  Alb  5.0  /  TBili  0.4  /  DBili  x   /  AST  22  /  ALT  16  /  AlkPhos  120  09-07      proBNP:   Lipid Profile:   HgA1c:   TSH:       CARDIAC MARKERS:            Interpretation of Telemetry: 	    ECG:  	  RADIOLOGY:  OTHER: 	    PREVIOUS DIAGNOSTIC TESTING:    [ ] Echocardiogram:  [ ] Catheterization:  [ ] Stress Test:  	    Assessment:  73-year-old female with history of HTN, CAD, carotid artery disease, DM, hypothyroid, and recent atraumatic right pelvic fracture (unclear exact fracture per patient but seems to indicate pubic rami) who presents with worsening right lower back pain which began approximately 4 to 5 days ago.  Patient states pain was mild initially at onset has been constant worsening since onset and denies any trauma or specific eliciting event.  No fever.  No numbness or weakness in extremities.  No loss of control of bowels or bladder.  No urinary symptoms no diarrhea.  No abdominal pain.  No nausea or vomiting. Further history from patient had right pelvic fracture which is likely his pubic rami though have no imaging in EMR and unclear history per patient.  Per her history no trauma or falls.    Recs:  cardiac stable  no e/o acs or chf  suggest asa and cont with statin give hx of cad/pad  cw antihypertensives and diuretics  pain control  ortho eval  will follow        Advanced care planning forms were discussed. Code status including forceful chest compressions, defibrillation and intubation were discussed. The risks benefits and alternatives to pertinent cardiac procedures and interventions were discussed in detail and all questions were answered. Duration: 15 minutes.  Greater than 90 minutes spent on total encounter; more than 50% of the visit was spent counseling and/or coordinating care by the attending physician.   	  Carlos Diego MD   Cardiovascular Diseases  [phone]     CHIEF COMPLAINT: back pain    HISTORY OF PRESENT ILLNESS:  73-year-old female with history of HTN, CAD, carotid artery disease, DM, hypothyroid, and recent atraumatic right pelvic fracture (unclear exact fracture per patient but seems to indicate pubic rami) who presents with worsening right lower back pain which began approximately 4 to 5 days ago.  Patient states pain was mild initially at onset has been constant worsening since onset and denies any trauma or specific eliciting event.  No fever.  No numbness or weakness in extremities.  No loss of control of bowels or bladder.  No urinary symptoms no diarrhea.  No abdominal pain.  No nausea or vomiting. Further history from patient had right pelvic fracture which is likely his pubic rami though have no imaging in EMR and unclear history per patient.  Per her history no trauma or falls.      Allergies    penicillin (Unknown)    Intolerances    	    MEDICATIONS:  amLODIPine   Tablet 5 milliGRAM(s) Oral daily  enoxaparin Injectable 40 milliGRAM(s) SubCutaneous every 24 hours  furosemide    Tablet 40 milliGRAM(s) Oral daily  metoprolol tartrate 25 milliGRAM(s) Oral at bedtime  spironolactone 50 milliGRAM(s) Oral daily        acetaminophen     Tablet .. 650 milliGRAM(s) Oral every 6 hours PRN  aspirin 325 milliGRAM(s) Oral daily  oxycodone    5 mG/acetaminophen 325 mG 1 Tablet(s) Oral every 6 hours PRN      allopurinol 300 milliGRAM(s) Oral daily  atorvastatin 40 milliGRAM(s) Oral at bedtime  dextrose 50% Injectable 25 Gram(s) IV Push once  dextrose 50% Injectable 12.5 Gram(s) IV Push once  dextrose 50% Injectable 25 Gram(s) IV Push once  dextrose Oral Gel 15 Gram(s) Oral once PRN  glucagon  Injectable 1 milliGRAM(s) IntraMuscular once  insulin lispro (ADMELOG) corrective regimen sliding scale   SubCutaneous three times a day before meals  insulin lispro (ADMELOG) corrective regimen sliding scale   SubCutaneous at bedtime  levothyroxine 50 MICROGram(s) Oral <User Schedule>  levothyroxine 75 MICROGram(s) Oral <User Schedule>    dextrose 5%. 1000 milliLiter(s) IV Continuous <Continuous>  dextrose 5%. 1000 milliLiter(s) IV Continuous <Continuous>      PAST MEDICAL & SURGICAL HISTORY:  Diabetes mellitus      CAD (coronary artery disease)      H/O pericardiotomy      History of gout      Hypertension      Hypothyroidism      Hyperlipidemia          FAMILY HISTORY:      SOCIAL HISTORY:    non smoker. indep in adl      REVIEW OF SYSTEMS:  See HPI, otherwise complete 10 point review of systems negative    [ ] All others negative	      PHYSICAL EXAM:  T(C): 36.5 (09-08-23 @ 04:53), Max: 36.9 (09-07-23 @ 14:39)  HR: 55 (09-08-23 @ 04:53) (55 - 78)  BP: 130/69 (09-08-23 @ 04:53) (130/69 - 179/77)  RR: 18 (09-08-23 @ 04:53) (16 - 18)  SpO2: 98% (09-08-23 @ 04:53) (97% - 100%)  Wt(kg): --  I&O's Summary      Appearance: No Acute Distress	  HEENT:  Normal oral mucosa, PERRL, EOMI	  Cardiovascular: Normal S1 S2, No JVD, No murmurs/rubs/gallops  Respiratory: Lungs clear to auscultation bilaterally  Gastrointestinal:  Soft, Non-tender, + BS	  Skin: No rashes, No ecchymoses, No cyanosis	  Neurologic: Non-focal  Extremities: No clubbing, cyanosis or edema  Vascular: Peripheral pulses palpable 2+ bilaterally  Psychiatry: A & O x 3, Mood & affect appropriate    Laboratory Data:	 	    CBC Full  -  ( 08 Sep 2023 06:58 )  WBC Count : 10.80 K/uL  Hemoglobin : 10.8 g/dL  Hematocrit : 32.9 %  Platelet Count - Automated : 363 K/uL  Mean Cell Volume : 94.8 fl  Mean Cell Hemoglobin : 31.1 pg  Mean Cell Hemoglobin Concentration : 32.8 gm/dL  Auto Neutrophil # : x  Auto Lymphocyte # : x  Auto Monocyte # : x  Auto Eosinophil # : x  Auto Basophil # : x  Auto Neutrophil % : x  Auto Lymphocyte % : x  Auto Monocyte % : x  Auto Eosinophil % : x  Auto Basophil % : x    09-07    129<L>  |  90<L>  |  32<H>  ----------------------------<  90  3.9   |  19<L>  |  1.43<H>    Ca    11.4<H>      07 Sep 2023 17:25    TPro  8.6<H>  /  Alb  5.0  /  TBili  0.4  /  DBili  x   /  AST  22  /  ALT  16  /  AlkPhos  120  09-07      proBNP:   Lipid Profile:   HgA1c:   TSH:       CARDIAC MARKERS:            Interpretation of Telemetry: 	    ECG:  	  RADIOLOGY:  OTHER: 	    PREVIOUS DIAGNOSTIC TESTING:    [ ] Echocardiogram:  [ ] Catheterization:  [ ] Stress Test:  	    Assessment:  73-year-old female with history of HTN, CAD, carotid artery disease, DM, hypothyroid, and recent atraumatic right pelvic fracture (unclear exact fracture per patient but seems to indicate pubic rami) who presents with worsening right lower back pain which began approximately 4 to 5 days ago.  Patient states pain was mild initially at onset has been constant worsening since onset and denies any trauma or specific eliciting event.  No fever.  No numbness or weakness in extremities.  No loss of control of bowels or bladder.  No urinary symptoms no diarrhea.  No abdominal pain.  No nausea or vomiting. Further history from patient had right pelvic fracture which is likely his pubic rami though have no imaging in EMR and unclear history per patient.  Per her history no trauma or falls.    Recs:  cardiac stable  no e/o acs or chf  suggest asa and cont with statin give hx of cad/pad  cw antihypertensives and diuretics  pain control  ortho eval  will follow        Advanced care planning forms were discussed. Code status including forceful chest compressions, defibrillation and intubation were discussed. The risks benefits and alternatives to pertinent cardiac procedures and interventions were discussed in detail and all questions were answered. Duration: 15 minutes.  Greater than 90 minutes spent on total encounter; more than 50% of the visit was spent counseling and/or coordinating care by the attending physician.   	  Carlos Diego MD   Cardiovascular Diseases  [phone]     CHIEF COMPLAINT: back pain    HISTORY OF PRESENT ILLNESS:  73-year-old female with history of HTN, CAD, carotid artery disease, DM, hypothyroid, and recent atraumatic right pelvic fracture (unclear exact fracture per patient but seems to indicate pubic rami) who presents with worsening right lower back pain which began approximately 4 to 5 days ago.  Patient states pain was mild initially at onset has been constant worsening since onset and denies any trauma or specific eliciting event.  No fever.  No numbness or weakness in extremities.  No loss of control of bowels or bladder.  No urinary symptoms no diarrhea.  No abdominal pain.  No nausea or vomiting. Further history from patient had right pelvic fracture which is likely his pubic rami though have no imaging in EMR and unclear history per patient.  Per her history no trauma or falls.      Allergies    penicillin (Unknown)    Intolerances    	    MEDICATIONS:  amLODIPine   Tablet 5 milliGRAM(s) Oral daily  enoxaparin Injectable 40 milliGRAM(s) SubCutaneous every 24 hours  furosemide    Tablet 40 milliGRAM(s) Oral daily  metoprolol tartrate 25 milliGRAM(s) Oral at bedtime  spironolactone 50 milliGRAM(s) Oral daily        acetaminophen     Tablet .. 650 milliGRAM(s) Oral every 6 hours PRN  aspirin 325 milliGRAM(s) Oral daily  oxycodone    5 mG/acetaminophen 325 mG 1 Tablet(s) Oral every 6 hours PRN      allopurinol 300 milliGRAM(s) Oral daily  atorvastatin 40 milliGRAM(s) Oral at bedtime  dextrose 50% Injectable 25 Gram(s) IV Push once  dextrose 50% Injectable 12.5 Gram(s) IV Push once  dextrose 50% Injectable 25 Gram(s) IV Push once  dextrose Oral Gel 15 Gram(s) Oral once PRN  glucagon  Injectable 1 milliGRAM(s) IntraMuscular once  insulin lispro (ADMELOG) corrective regimen sliding scale   SubCutaneous three times a day before meals  insulin lispro (ADMELOG) corrective regimen sliding scale   SubCutaneous at bedtime  levothyroxine 50 MICROGram(s) Oral <User Schedule>  levothyroxine 75 MICROGram(s) Oral <User Schedule>    dextrose 5%. 1000 milliLiter(s) IV Continuous <Continuous>  dextrose 5%. 1000 milliLiter(s) IV Continuous <Continuous>      PAST MEDICAL & SURGICAL HISTORY:  Diabetes mellitus      CAD (coronary artery disease)      H/O pericardiotomy      History of gout      Hypertension      Hypothyroidism      Hyperlipidemia          FAMILY HISTORY:      SOCIAL HISTORY:    non smoker. indep in adl      REVIEW OF SYSTEMS:  See HPI, otherwise complete 10 point review of systems negative    [ ] All others negative	      PHYSICAL EXAM:  T(C): 36.5 (09-08-23 @ 04:53), Max: 36.9 (09-07-23 @ 14:39)  HR: 55 (09-08-23 @ 04:53) (55 - 78)  BP: 130/69 (09-08-23 @ 04:53) (130/69 - 179/77)  RR: 18 (09-08-23 @ 04:53) (16 - 18)  SpO2: 98% (09-08-23 @ 04:53) (97% - 100%)  Wt(kg): --  I&O's Summary      Appearance: No Acute Distress	  HEENT:  Normal oral mucosa, PERRL, EOMI	  Cardiovascular: Normal S1 S2, No JVD, No murmurs/rubs/gallops  Respiratory: Lungs clear to auscultation bilaterally  Gastrointestinal:  Soft, Non-tender, + BS	  Skin: No rashes, No ecchymoses, No cyanosis	  Neurologic: Non-focal  Extremities: No clubbing, cyanosis or edema  Vascular: Peripheral pulses palpable 2+ bilaterally  Psychiatry: A & O x 3, Mood & affect appropriate    Laboratory Data:	 	    CBC Full  -  ( 08 Sep 2023 06:58 )  WBC Count : 10.80 K/uL  Hemoglobin : 10.8 g/dL  Hematocrit : 32.9 %  Platelet Count - Automated : 363 K/uL  Mean Cell Volume : 94.8 fl  Mean Cell Hemoglobin : 31.1 pg  Mean Cell Hemoglobin Concentration : 32.8 gm/dL  Auto Neutrophil # : x  Auto Lymphocyte # : x  Auto Monocyte # : x  Auto Eosinophil # : x  Auto Basophil # : x  Auto Neutrophil % : x  Auto Lymphocyte % : x  Auto Monocyte % : x  Auto Eosinophil % : x  Auto Basophil % : x    09-07    129<L>  |  90<L>  |  32<H>  ----------------------------<  90  3.9   |  19<L>  |  1.43<H>    Ca    11.4<H>      07 Sep 2023 17:25    TPro  8.6<H>  /  Alb  5.0  /  TBili  0.4  /  DBili  x   /  AST  22  /  ALT  16  /  AlkPhos  120  09-07      proBNP:   Lipid Profile:   HgA1c:   TSH:       CARDIAC MARKERS:            Interpretation of Telemetry: 	    ECG:  	  RADIOLOGY:  OTHER: 	    PREVIOUS DIAGNOSTIC TESTING:    [ ] Echocardiogram:  [ ] Catheterization:  [ ] Stress Test:  	    Assessment:  73-year-old female with history of HTN, CAD, carotid artery disease, DM, hypothyroid, and recent atraumatic right pelvic fracture (unclear exact fracture per patient but seems to indicate pubic rami) who presents with worsening right lower back pain which began approximately 4 to 5 days ago.  Patient states pain was mild initially at onset has been constant worsening since onset and denies any trauma or specific eliciting event.  No fever.  No numbness or weakness in extremities.  No loss of control of bowels or bladder.  No urinary symptoms no diarrhea.  No abdominal pain.  No nausea or vomiting. Further history from patient had right pelvic fracture which is likely his pubic rami though have no imaging in EMR and unclear history per patient.  Per her history no trauma or falls.    Recs:  cardiac stable  no e/o acs or chf  suggest asa and cont with statin give hx of cad/pad  cw antihypertensives and diuretics  pain control  ortho eval  will follow        Advanced care planning forms were discussed. Code status including forceful chest compressions, defibrillation and intubation were discussed. The risks benefits and alternatives to pertinent cardiac procedures and interventions were discussed in detail and all questions were answered. Duration: 15 minutes.  Greater than 90 minutes spent on total encounter; more than 50% of the visit was spent counseling and/or coordinating care by the attending physician.   	  Carlos Diego MD   Cardiovascular Diseases  [phone]

## 2023-09-08 NOTE — PATIENT PROFILE ADULT - NSPROHMDIABETMGMTSTRAT_GEN_A_NUR
Needs help if she cant get around like how she used to/activity/diet modification/medication therapy

## 2023-09-08 NOTE — PROGRESS NOTE ADULT - SUBJECTIVE AND OBJECTIVE BOX
Patient is a 73y old  Female who presents with a chief complaint of     SUBJECTIVE / OVERNIGHT EVENTS: c/o back pain / back of pelvis area  Review of Systems  chest pain no  palpitations no  sob no  nausea no  headache no    MEDICATIONS  (STANDING):  allopurinol 300 milliGRAM(s) Oral daily  amLODIPine   Tablet 5 milliGRAM(s) Oral daily  aspirin 325 milliGRAM(s) Oral daily  atorvastatin 40 milliGRAM(s) Oral at bedtime  dextrose 5%. 1000 milliLiter(s) (100 mL/Hr) IV Continuous <Continuous>  dextrose 5%. 1000 milliLiter(s) (50 mL/Hr) IV Continuous <Continuous>  dextrose 50% Injectable 25 Gram(s) IV Push once  dextrose 50% Injectable 12.5 Gram(s) IV Push once  dextrose 50% Injectable 25 Gram(s) IV Push once  enoxaparin Injectable 40 milliGRAM(s) SubCutaneous every 24 hours  furosemide    Tablet 40 milliGRAM(s) Oral daily  glucagon  Injectable 1 milliGRAM(s) IntraMuscular once  insulin lispro (ADMELOG) corrective regimen sliding scale   SubCutaneous three times a day before meals  insulin lispro (ADMELOG) corrective regimen sliding scale   SubCutaneous at bedtime  levothyroxine 75 MICROGram(s) Oral <User Schedule>  levothyroxine 50 MICROGram(s) Oral <User Schedule>  metoprolol tartrate 25 milliGRAM(s) Oral at bedtime  spironolactone 50 milliGRAM(s) Oral daily    MEDICATIONS  (PRN):  acetaminophen     Tablet .. 650 milliGRAM(s) Oral every 6 hours PRN Temp greater or equal to 38.5C (101.3F), Mild Pain (1 - 3)  dextrose Oral Gel 15 Gram(s) Oral once PRN Blood Glucose LESS THAN 70 milliGRAM(s)/deciliter  oxycodone    5 mG/acetaminophen 325 mG 1 Tablet(s) Oral every 6 hours PRN Moderate Pain (4 - 6)      Vital Signs Last 24 Hrs  T(C): 36.3 (08 Sep 2023 19:26), Max: 36.9 (08 Sep 2023 18:02)  T(F): 97.3 (08 Sep 2023 19:26), Max: 98.4 (08 Sep 2023 18:02)  HR: 74 (08 Sep 2023 19:26) (55 - 84)  BP: 157/76 (08 Sep 2023 19:26) (130/66 - 165/77)  BP(mean): --  RR: 18 (08 Sep 2023 19:26) (18 - 18)  SpO2: 98% (08 Sep 2023 19:26) (97% - 99%)    Parameters below as of 08 Sep 2023 19:26  Patient On (Oxygen Delivery Method): room air        PHYSICAL EXAM:  GENERAL: NAD  HEAD:  Atraumatic, Normocephalic  EYES: EOMI, PERRLA, conjunctiva and sclera clear  NECK: Supple, No JVD  CHEST/LUNG: Clear to auscultation bilaterally; No wheeze  HEART: Regular rate and rhythm; No murmurs, rubs, or gallops  ABDOMEN: Soft, Nontender, Nondistended; Bowel sounds present  EXTREMITIES:  2+ Peripheral Pulses, No clubbing, cyanosis, or edema  PSYCH: AAOx3  NEUROLOGY: non-focal  SKIN: No rashes or lesions    LABS:                        10.8   10.80 )-----------( 363      ( 08 Sep 2023 06:58 )             32.9     09-08    131<L>  |  96  |  32<H>  ----------------------------<  96  4.3   |  19<L>  |  1.32<H>    Ca    9.9      08 Sep 2023 06:57    TPro  7.0  /  Alb  4.0  /  TBili  0.4  /  DBili  x   /  AST  18  /  ALT  12  /  AlkPhos  100  09-08          Urinalysis Basic - ( 08 Sep 2023 06:57 )    Color: x / Appearance: x / SG: x / pH: x  Gluc: 96 mg/dL / Ketone: x  / Bili: x / Urobili: x   Blood: x / Protein: x / Nitrite: x   Leuk Esterase: x / RBC: x / WBC x   Sq Epi: x / Non Sq Epi: x / Bacteria: x          RADIOLOGY & ADDITIONAL TESTS:    Imaging Personally Reviewed:    Consultant(s) Notes Reviewed:      Care Discussed with Consultants/Other Providers:

## 2023-09-08 NOTE — PATIENT PROFILE ADULT - FALL HARM RISK - HARM RISK INTERVENTIONS
Assistance OOB with selected safe patient handling equipment/Communicate Risk of Fall with Harm to all staff/Discuss with provider need for PT consult/Monitor gait and stability/Provide patient with walking aids - walker, cane, crutches/Reinforce activity limits and safety measures with patient and family/Tailored Fall Risk Interventions/Visual Cue: Yellow wristband and red socks/Bed in lowest position, wheels locked, appropriate side rails in place/Call bell, personal items and telephone in reach/Instruct patient to call for assistance before getting out of bed or chair/Non-slip footwear when patient is out of bed/[place] to call system/Physically safe environment - no spills, clutter or unnecessary equipment/Purposeful Proactive Rounding/Room/bathroom lighting operational, light cord in reach Assistance OOB with selected safe patient handling equipment/Communicate Risk of Fall with Harm to all staff/Discuss with provider need for PT consult/Monitor gait and stability/Provide patient with walking aids - walker, cane, crutches/Reinforce activity limits and safety measures with patient and family/Tailored Fall Risk Interventions/Visual Cue: Yellow wristband and red socks/Bed in lowest position, wheels locked, appropriate side rails in place/Call bell, personal items and telephone in reach/Instruct patient to call for assistance before getting out of bed or chair/Non-slip footwear when patient is out of bed/[place] to call system/Physically safe environment - no spills, clutter or unnecessary equipment/Purposeful Proactive Rounding/Room/bathroom lighting operational, light cord in reach Assistance OOB with selected safe patient handling equipment/Communicate Risk of Fall with Harm to all staff/Discuss with provider need for PT consult/Monitor gait and stability/Provide patient with walking aids - walker, cane, crutches/Reinforce activity limits and safety measures with patient and family/Tailored Fall Risk Interventions/Visual Cue: Yellow wristband and red socks/Bed in lowest position, wheels locked, appropriate side rails in place/Call bell, personal items and telephone in reach/Instruct patient to call for assistance before getting out of bed or chair/Non-slip footwear when patient is out of bed/[place] to call system/Physically safe environment - no spills, clutter or unnecessary equipment/Purposeful Proactive Rounding/Room/bathroom lighting operational, light cord in reach

## 2023-09-09 LAB
APPEARANCE UR: CLEAR — SIGNIFICANT CHANGE UP
BACTERIA # UR AUTO: NEGATIVE — SIGNIFICANT CHANGE UP
BILIRUB UR-MCNC: NEGATIVE — SIGNIFICANT CHANGE UP
COLOR SPEC: SIGNIFICANT CHANGE UP
DIFF PNL FLD: NEGATIVE — SIGNIFICANT CHANGE UP
EPI CELLS # UR: 1 /HPF — SIGNIFICANT CHANGE UP
GLUCOSE BLDC GLUCOMTR-MCNC: 133 MG/DL — HIGH (ref 70–99)
GLUCOSE BLDC GLUCOMTR-MCNC: 144 MG/DL — HIGH (ref 70–99)
GLUCOSE BLDC GLUCOMTR-MCNC: 192 MG/DL — HIGH (ref 70–99)
GLUCOSE BLDC GLUCOMTR-MCNC: 215 MG/DL — HIGH (ref 70–99)
GLUCOSE UR QL: NEGATIVE — SIGNIFICANT CHANGE UP
HYALINE CASTS # UR AUTO: 0 /LPF — SIGNIFICANT CHANGE UP (ref 0–2)
KETONES UR-MCNC: NEGATIVE — SIGNIFICANT CHANGE UP
LEUKOCYTE ESTERASE UR-ACNC: ABNORMAL
NITRITE UR-MCNC: NEGATIVE — SIGNIFICANT CHANGE UP
PH UR: 6 — SIGNIFICANT CHANGE UP (ref 5–8)
PROT UR-MCNC: ABNORMAL
RBC CASTS # UR COMP ASSIST: 1 /HPF — SIGNIFICANT CHANGE UP (ref 0–4)
SP GR SPEC: 1.02 — SIGNIFICANT CHANGE UP (ref 1.01–1.02)
UROBILINOGEN FLD QL: NEGATIVE — SIGNIFICANT CHANGE UP
WBC UR QL: 3 /HPF — SIGNIFICANT CHANGE UP (ref 0–5)

## 2023-09-09 PROCEDURE — 72190 X-RAY EXAM OF PELVIS: CPT | Mod: 26

## 2023-09-09 RX ADMIN — Medication 325 MILLIGRAM(S): at 09:51

## 2023-09-09 RX ADMIN — SPIRONOLACTONE 50 MILLIGRAM(S): 25 TABLET, FILM COATED ORAL at 09:00

## 2023-09-09 RX ADMIN — Medication 40 MILLIGRAM(S): at 09:00

## 2023-09-09 RX ADMIN — ENOXAPARIN SODIUM 40 MILLIGRAM(S): 100 INJECTION SUBCUTANEOUS at 12:16

## 2023-09-09 RX ADMIN — Medication 75 MICROGRAM(S): at 09:00

## 2023-09-09 RX ADMIN — Medication 300 MILLIGRAM(S): at 12:16

## 2023-09-09 RX ADMIN — Medication 4: at 12:20

## 2023-09-09 RX ADMIN — AMLODIPINE BESYLATE 5 MILLIGRAM(S): 2.5 TABLET ORAL at 09:00

## 2023-09-09 RX ADMIN — Medication 25 MILLIGRAM(S): at 21:14

## 2023-09-09 RX ADMIN — ATORVASTATIN CALCIUM 40 MILLIGRAM(S): 80 TABLET, FILM COATED ORAL at 21:14

## 2023-09-09 RX ADMIN — ONDANSETRON 4 MILLIGRAM(S): 8 TABLET, FILM COATED ORAL at 00:00

## 2023-09-09 NOTE — PROGRESS NOTE ADULT - SUBJECTIVE AND OBJECTIVE BOX
Patient is a 73y old  Female who presents with a chief complaint of     SUBJECTIVE / OVERNIGHT EVENTS: No new complaints. Still with pain R buttock area.   Review of Systems  chest pain no  palpitations no  sob no  nausea no  headache no    MEDICATIONS  (STANDING):  allopurinol 300 milliGRAM(s) Oral daily  amLODIPine   Tablet 5 milliGRAM(s) Oral daily  aspirin 325 milliGRAM(s) Oral daily  atorvastatin 40 milliGRAM(s) Oral at bedtime  dextrose 5%. 1000 milliLiter(s) (50 mL/Hr) IV Continuous <Continuous>  dextrose 5%. 1000 milliLiter(s) (100 mL/Hr) IV Continuous <Continuous>  dextrose 50% Injectable 25 Gram(s) IV Push once  dextrose 50% Injectable 12.5 Gram(s) IV Push once  dextrose 50% Injectable 25 Gram(s) IV Push once  enoxaparin Injectable 40 milliGRAM(s) SubCutaneous every 24 hours  furosemide    Tablet 40 milliGRAM(s) Oral daily  glucagon  Injectable 1 milliGRAM(s) IntraMuscular once  insulin lispro (ADMELOG) corrective regimen sliding scale   SubCutaneous three times a day before meals  insulin lispro (ADMELOG) corrective regimen sliding scale   SubCutaneous at bedtime  levothyroxine 50 MICROGram(s) Oral <User Schedule>  levothyroxine 75 MICROGram(s) Oral <User Schedule>  metoprolol tartrate 25 milliGRAM(s) Oral at bedtime  spironolactone 50 milliGRAM(s) Oral daily    MEDICATIONS  (PRN):  acetaminophen     Tablet .. 650 milliGRAM(s) Oral every 6 hours PRN Temp greater or equal to 38.5C (101.3F), Mild Pain (1 - 3)  dextrose Oral Gel 15 Gram(s) Oral once PRN Blood Glucose LESS THAN 70 milliGRAM(s)/deciliter  oxycodone    5 mG/acetaminophen 325 mG 1 Tablet(s) Oral every 6 hours PRN Moderate Pain (4 - 6)      Vital Signs Last 24 Hrs  T(C): 36.2 (09 Sep 2023 14:08), Max: 36.9 (08 Sep 2023 18:02)  T(F): 97.2 (09 Sep 2023 14:08), Max: 98.4 (08 Sep 2023 18:02)  HR: 62 (09 Sep 2023 14:08) (62 - 84)  BP: 165/69 (09 Sep 2023 14:08) (120/64 - 165/77)  BP(mean): --  RR: 17 (09 Sep 2023 14:08) (17 - 18)  SpO2: 99% (09 Sep 2023 14:08) (98% - 99%)    Parameters below as of 09 Sep 2023 14:08  Patient On (Oxygen Delivery Method): room air        PHYSICAL EXAM:  GENERAL: NAD, well-developed  HEAD:  Atraumatic, Normocephalic  EYES: EOMI, PERRLA, conjunctiva and sclera clear  NECK: Supple, No JVD  CHEST/LUNG: Clear to auscultation bilaterally; No wheeze  HEART: Regular rate and rhythm; No murmurs, rubs, or gallops  ABDOMEN: Soft, Nontender, Nondistended; Bowel sounds present  EXTREMITIES:  2+ Peripheral Pulses, No clubbing, cyanosis, or edema  PSYCH: AAOx3  NEUROLOGY: non-focal  SKIN: No rashes or lesions    LABS:                        10.8   10.80 )-----------( 363      ( 08 Sep 2023 06:58 )             32.9     -    131<L>  |  96  |  32<H>  ----------------------------<  96  4.3   |  19<L>  |  1.32<H>    Ca    9.9      08 Sep 2023 06:57    TPro  7.0  /  Alb  4.0  /  TBili  0.4  /  DBili  x   /  AST  18  /  ALT  12  /  AlkPhos  100  -08          Urinalysis Basic - ( 09 Sep 2023 09:33 )    Color: Light Yellow / Appearance: Clear / S.018 / pH: x  Gluc: x / Ketone: Negative  / Bili: Negative / Urobili: Negative   Blood: x / Protein: 30 mg/dL / Nitrite: Negative   Leuk Esterase: Small / RBC: 1 /hpf / WBC 3 /HPF   Sq Epi: x / Non Sq Epi: x / Bacteria: Negative          RADIOLOGY & ADDITIONAL TESTS:    Imaging Personally Reviewed:    Consultant(s) Notes Reviewed:      Care Discussed with Consultants/Other Providers:

## 2023-09-09 NOTE — PROGRESS NOTE ADULT - ASSESSMENT
73 f with    Back pain  - pain control  - MRI LS spine pending   - PT  - UA pending   - Pelvis xray result pending   - Ortho called. Recommends OTP follow.     Hypercalcemia  - follow  - PTH  - Vit D    CAD (coronary artery disease)   - stable  - Echo  - Cardiology evaluation dr Diego     Diabetes mellitus   - ADA diet  - BS control    H/O pericardiotomy   - Echo    History of gout   - continue Rx  - Uric acid    Hyperlipidemia   - stable    Hypertension   - control    Hypothyroidism   - supplement  - follow TSH    DVT prophylaxis     Guido Rolon MD phone [phone]      73 f with    Back pain  - pain control  - MRI LS spine pending   - PT  - UA pending   - Pelvis xray result pending   - Ortho called. Recommends OTP follow.     Hypercalcemia  - follow  - PTH  - Vit D    CAD (coronary artery disease)   - stable  - Echo  - Cardiology evaluation dr Diego     Diabetes mellitus   - ADA diet  - BS control    H/O pericardiotomy   - Echo    History of gout   - continue Rx  - Uric acid    Hyperlipidemia   - stable    Hypertension   - control    Hypothyroidism   - supplement  - follow TSH    DVT prophylaxis     Guido Rolon MD phone [phone]      73 f with    Back pain  - pain control  - MRI LS spine pending   - PT  - UA pending   - Pelvis xray result pending   - Ortho called. Recommends OTP follow.     Hypercalcemia  - follow  - PTH  - Vit D    CAD (coronary artery disease)   - stable  - Echo  - Cardiology evaluation dr Diego     Diabetes mellitus   - ADA diet  - BS control    H/O pericardiotomy   - Echo    History of gout   - continue Rx  - Uric acid    Hyperlipidemia   - stable    Hypertension   - control    Hypothyroidism   - supplement  - follow TSH    DVT prophylaxis     Guido Rolon MD phone [phone]

## 2023-09-10 LAB
ANION GAP SERPL CALC-SCNC: 17 MMOL/L — SIGNIFICANT CHANGE UP (ref 5–17)
BUN SERPL-MCNC: 38 MG/DL — HIGH (ref 7–23)
CALCIUM SERPL-MCNC: 10.3 MG/DL — SIGNIFICANT CHANGE UP (ref 8.4–10.5)
CHLORIDE SERPL-SCNC: 92 MMOL/L — LOW (ref 96–108)
CO2 SERPL-SCNC: 19 MMOL/L — LOW (ref 22–31)
CREAT SERPL-MCNC: 1.56 MG/DL — HIGH (ref 0.5–1.3)
EGFR: 34 ML/MIN/1.73M2 — LOW
GLUCOSE BLDC GLUCOMTR-MCNC: 130 MG/DL — HIGH (ref 70–99)
GLUCOSE BLDC GLUCOMTR-MCNC: 165 MG/DL — HIGH (ref 70–99)
GLUCOSE BLDC GLUCOMTR-MCNC: 184 MG/DL — HIGH (ref 70–99)
GLUCOSE BLDC GLUCOMTR-MCNC: 193 MG/DL — HIGH (ref 70–99)
GLUCOSE SERPL-MCNC: 156 MG/DL — HIGH (ref 70–99)
HCT VFR BLD CALC: 34.4 % — LOW (ref 34.5–45)
HGB BLD-MCNC: 11.4 G/DL — LOW (ref 11.5–15.5)
MCHC RBC-ENTMCNC: 30.8 PG — SIGNIFICANT CHANGE UP (ref 27–34)
MCHC RBC-ENTMCNC: 33.1 GM/DL — SIGNIFICANT CHANGE UP (ref 32–36)
MCV RBC AUTO: 93 FL — SIGNIFICANT CHANGE UP (ref 80–100)
NRBC # BLD: 0 /100 WBCS — SIGNIFICANT CHANGE UP (ref 0–0)
PLATELET # BLD AUTO: 380 K/UL — SIGNIFICANT CHANGE UP (ref 150–400)
POTASSIUM SERPL-MCNC: 4.5 MMOL/L — SIGNIFICANT CHANGE UP (ref 3.5–5.3)
POTASSIUM SERPL-SCNC: 4.5 MMOL/L — SIGNIFICANT CHANGE UP (ref 3.5–5.3)
RBC # BLD: 3.7 M/UL — LOW (ref 3.8–5.2)
RBC # FLD: 14.5 % — SIGNIFICANT CHANGE UP (ref 10.3–14.5)
SODIUM SERPL-SCNC: 128 MMOL/L — LOW (ref 135–145)
WBC # BLD: 11.62 K/UL — HIGH (ref 3.8–10.5)
WBC # FLD AUTO: 11.62 K/UL — HIGH (ref 3.8–10.5)

## 2023-09-10 PROCEDURE — 72148 MRI LUMBAR SPINE W/O DYE: CPT | Mod: 26

## 2023-09-10 RX ORDER — ASCORBIC ACID 60 MG
500 TABLET,CHEWABLE ORAL DAILY
Refills: 0 | Status: DISCONTINUED | OUTPATIENT
Start: 2023-09-10 | End: 2023-09-21

## 2023-09-10 RX ADMIN — Medication 25 MILLIGRAM(S): at 23:15

## 2023-09-10 RX ADMIN — Medication 50 MICROGRAM(S): at 08:59

## 2023-09-10 RX ADMIN — Medication 325 MILLIGRAM(S): at 09:00

## 2023-09-10 RX ADMIN — Medication 300 MILLIGRAM(S): at 09:00

## 2023-09-10 RX ADMIN — Medication 2: at 09:00

## 2023-09-10 RX ADMIN — ATORVASTATIN CALCIUM 40 MILLIGRAM(S): 80 TABLET, FILM COATED ORAL at 23:16

## 2023-09-10 RX ADMIN — Medication 2: at 12:44

## 2023-09-10 RX ADMIN — SPIRONOLACTONE 50 MILLIGRAM(S): 25 TABLET, FILM COATED ORAL at 06:12

## 2023-09-10 RX ADMIN — Medication 40 MILLIGRAM(S): at 06:12

## 2023-09-10 RX ADMIN — AMLODIPINE BESYLATE 5 MILLIGRAM(S): 2.5 TABLET ORAL at 06:12

## 2023-09-10 RX ADMIN — ENOXAPARIN SODIUM 40 MILLIGRAM(S): 100 INJECTION SUBCUTANEOUS at 09:00

## 2023-09-10 NOTE — DIETITIAN INITIAL EVALUATION ADULT - ENERGY INTAKE
Fair (50-75%) Pt with fair PO intake, consumed about half of meal tray. Did not eat boiled eggs as she dislikes them, preferences noted.

## 2023-09-10 NOTE — DIETITIAN INITIAL EVALUATION ADULT - REASON
Deferred per pt preference however pt with notable mild/moderate muscle loss to temples, and moderate fat loss to orbital fat pads.

## 2023-09-10 NOTE — PROGRESS NOTE ADULT - SUBJECTIVE AND OBJECTIVE BOX
Patient is a 73y old  Female who presents with a chief complaint of Pt is a 73-year-old female with history of HTN, CAD and unclear open heart surgery (per patient may be CABG, on  daily), DM, hypothyroid, and recent atraumatic right pelvic fracture (unclear exact fracture per patient but seems to indicate pubic rami) who presents with worsening right lower back pain     (10 Sep 2023 10:59)      SUBJECTIVE / OVERNIGHT EVENTS: Comfortable without new complaints.   Review of Systems  chest pain no  palpitations no  sob no  nausea no  headache no    MEDICATIONS  (STANDING):  allopurinol 300 milliGRAM(s) Oral daily  amLODIPine   Tablet 5 milliGRAM(s) Oral daily  aspirin 325 milliGRAM(s) Oral daily  atorvastatin 40 milliGRAM(s) Oral at bedtime  dextrose 5%. 1000 milliLiter(s) (100 mL/Hr) IV Continuous <Continuous>  dextrose 5%. 1000 milliLiter(s) (50 mL/Hr) IV Continuous <Continuous>  dextrose 50% Injectable 25 Gram(s) IV Push once  dextrose 50% Injectable 12.5 Gram(s) IV Push once  dextrose 50% Injectable 25 Gram(s) IV Push once  enoxaparin Injectable 40 milliGRAM(s) SubCutaneous every 24 hours  furosemide    Tablet 40 milliGRAM(s) Oral daily  glucagon  Injectable 1 milliGRAM(s) IntraMuscular once  insulin lispro (ADMELOG) corrective regimen sliding scale   SubCutaneous three times a day before meals  insulin lispro (ADMELOG) corrective regimen sliding scale   SubCutaneous at bedtime  levothyroxine 75 MICROGram(s) Oral <User Schedule>  levothyroxine 50 MICROGram(s) Oral <User Schedule>  metoprolol tartrate 25 milliGRAM(s) Oral at bedtime  spironolactone 50 milliGRAM(s) Oral daily    MEDICATIONS  (PRN):  acetaminophen     Tablet .. 650 milliGRAM(s) Oral every 6 hours PRN Temp greater or equal to 38.5C (101.3F), Mild Pain (1 - 3)  dextrose Oral Gel 15 Gram(s) Oral once PRN Blood Glucose LESS THAN 70 milliGRAM(s)/deciliter  oxycodone    5 mG/acetaminophen 325 mG 1 Tablet(s) Oral every 6 hours PRN Moderate Pain (4 - 6)      Vital Signs Last 24 Hrs  T(C): 36.4 (10 Sep 2023 12:23), Max: 36.4 (10 Sep 2023 04:22)  T(F): 97.6 (10 Sep 2023 12:23), Max: 97.6 (10 Sep 2023 04:22)  HR: 74 (10 Sep 2023 12:23) (60 - 74)  BP: 147/69 (10 Sep 2023 12:23) (108/60 - 147/69)  BP(mean): --  RR: 17 (10 Sep 2023 12:23) (17 - 18)  SpO2: 98% (10 Sep 2023 12:23) (97% - 98%)    Parameters below as of 10 Sep 2023 12:23  Patient On (Oxygen Delivery Method): room air        PHYSICAL EXAM:  GENERAL: NAD, well-developed  HEAD:  Atraumatic, Normocephalic  EYES: EOMI, PERRLA, conjunctiva and sclera clear  NECK: Supple, No JVD  CHEST/LUNG: Clear to auscultation bilaterally; No wheeze  HEART: Regular rate and rhythm; No murmurs, rubs, or gallops  ABDOMEN: Soft, Nontender, Nondistended; Bowel sounds present  EXTREMITIES:  2+ Peripheral Pulses, No clubbing, cyanosis, or edema  PSYCH: AAOx3  NEUROLOGY: non-focal  SKIN: No rashes or lesions    LABS:                        11.4   11.62 )-----------( 380      ( 10 Sep 2023 06:50 )             34.4     09-10    128<L>  |  92<L>  |  38<H>  ----------------------------<  156<H>  4.5   |  19<L>  |  1.56<H>    Ca    10.3      10 Sep 2023 06:59            Urinalysis Basic - ( 10 Sep 2023 06:59 )    Color: x / Appearance: x / SG: x / pH: x  Gluc: 156 mg/dL / Ketone: x  / Bili: x / Urobili: x   Blood: x / Protein: x / Nitrite: x   Leuk Esterase: x / RBC: x / WBC x   Sq Epi: x / Non Sq Epi: x / Bacteria: x          RADIOLOGY & ADDITIONAL TESTS:    Imaging Personally Reviewed:    Consultant(s) Notes Reviewed:      Care Discussed with Consultants/Other Providers:

## 2023-09-10 NOTE — PROGRESS NOTE ADULT - ASSESSMENT
73 f with    Back pain  - pain control  - MRI LS spine pending   - PT  - UA pending   - Pelvis xray result pending   - Ortho called. Recommends OTP follow.     Hypercalcemia  - follow  - PTH  - Vit D    CAD (coronary artery disease)   - stable  - Echo  - Cardiology evaluation dr Diego     Diabetes mellitus   - ADA diet  - BS control    H/O pericardiotomy   - Echo    History of gout   - continue Rx  - Uric acid    Hyperlipidemia   - stable    Hypertension   - control    Hypothyroidism   - supplement  - follow TSH    DVT prophylaxis     Guido Rolon MD phone [phone]      73 f with    Back pain  - pain control  - MRI LS spine pending   - PT  - UA pending   - Pelvis xray result pending   - Ortho called. Recommends OTP follow.     Hypercalcemia  - follow  - PTH  - Vit D    CAD (coronary artery disease)   - stable  - Echo  - Cardiology evaluation dr Diego     Diabetes mellitus   - ADA diet  - BS control    H/O pericardiotomy   - Echo    History of gout   - continue Rx  - Uric acid    Hyperlipidemia   - stable    Hypertension   - control    Hypothyroidism   - supplement  - follow TSH    DVT prophylaxis     Guido Rolon MD phone [phone]      73 f with    Back pain  - pain control  - MRI LS spine pending   - PT  - UA pending   - Pelvis xray result pending   - Ortho called. Recommends OTP follow.     Hypercalcemia  - follow  - PTH  - Vit D    CAD (coronary artery disease)   - stable  - Echo  - Cardiology evaluation dr Diego     Diabetes mellitus   - ADA diet  - BS control    H/O pericardiotomy   - Echo    History of gout   - continue Rx  - Uric acid    Hyperlipidemia   - stable    Hypertension   - control    Hypothyroidism   - supplement  - follow TSH    DVT prophylaxis     Guido Rolon MD phone [phone]

## 2023-09-10 NOTE — DIETITIAN INITIAL EVALUATION ADULT - PERTINENT MEDS FT
MEDICATIONS  (STANDING):  allopurinol 300 milliGRAM(s) Oral daily  amLODIPine   Tablet 5 milliGRAM(s) Oral daily  aspirin 325 milliGRAM(s) Oral daily  atorvastatin 40 milliGRAM(s) Oral at bedtime  dextrose 5%. 1000 milliLiter(s) (50 mL/Hr) IV Continuous <Continuous>  dextrose 5%. 1000 milliLiter(s) (100 mL/Hr) IV Continuous <Continuous>  dextrose 50% Injectable 25 Gram(s) IV Push once  dextrose 50% Injectable 12.5 Gram(s) IV Push once  dextrose 50% Injectable 25 Gram(s) IV Push once  enoxaparin Injectable 40 milliGRAM(s) SubCutaneous every 24 hours  furosemide    Tablet 40 milliGRAM(s) Oral daily  glucagon  Injectable 1 milliGRAM(s) IntraMuscular once  insulin lispro (ADMELOG) corrective regimen sliding scale   SubCutaneous three times a day before meals  insulin lispro (ADMELOG) corrective regimen sliding scale   SubCutaneous at bedtime  levothyroxine 50 MICROGram(s) Oral <User Schedule>  levothyroxine 75 MICROGram(s) Oral <User Schedule>  metoprolol tartrate 25 milliGRAM(s) Oral at bedtime  spironolactone 50 milliGRAM(s) Oral daily    MEDICATIONS  (PRN):  acetaminophen     Tablet .. 650 milliGRAM(s) Oral every 6 hours PRN Temp greater or equal to 38.5C (101.3F), Mild Pain (1 - 3)  dextrose Oral Gel 15 Gram(s) Oral once PRN Blood Glucose LESS THAN 70 milliGRAM(s)/deciliter  oxycodone    5 mG/acetaminophen 325 mG 1 Tablet(s) Oral every 6 hours PRN Moderate Pain (4 - 6)

## 2023-09-10 NOTE — DIETITIAN INITIAL EVALUATION ADULT - ADD RECOMMEND
- Recommend multivitamin and vitamin C to promote wound healing.   - Provide encouragement with PO intake, menu selections, and assistance with meals as needed.   - Reinforce nutrition education as needed - RD remains available.   - Continue to monitor nutritional intake, labs, weights, BM, skin, clinical course.

## 2023-09-10 NOTE — DIETITIAN INITIAL EVALUATION ADULT - NAME AND PHONE
Cristine Perez MS, RD, CDN, [facility]; Pager #[phone] or MS Teams (preferred)  Cristine Perez MS, RD, CDN, [facility]; Pager #[phone] or MS Teams (preferred)  Cristine Perez MS, RD, CDN, [facility]; Pager #[phone] or MS Teams (preferred)

## 2023-09-10 NOTE — DIETITIAN INITIAL EVALUATION ADULT - OTHER INFO
- HbA1c 6.0%, pt reports taking Metformin and Glipizide at home, states she checks her FS every 3 days.    Weight Hx:  - Pt states she used to weigh herself often but has not in 1-2 months. UBW reported at 152lbs 1-2 months ago. Pt unsure if she has lost weight. Bedscale of 142.4lbs obtained 9/8; suggesting unintentional weight loss. Pt ordered for Lasix at home and during admission, no hx of CHF noted. Will continue to monitor/trend.

## 2023-09-10 NOTE — DIETITIAN INITIAL EVALUATION ADULT - SIGNS/SYMPTOMS
reported 6.6% wt loss x 1-2 months, signs of moderate muscle loss, mild edema  pt presenting with stage II pressure injury

## 2023-09-10 NOTE — DIETITIAN NUTRITION RISK NOTIFICATION - TREATMENT: THE FOLLOWING DIET HAS BEEN RECOMMENDED
Diet, Regular:   Consistent Carbohydrate {Evening Snack} (CSTCHOSN) (09-07-23 @ 21:57) [Active]

## 2023-09-10 NOTE — PHYSICAL THERAPY INITIAL EVALUATION ADULT - ADDITIONAL COMMENTS
pt lives alone in second floor apt with a full flight of steps with HR to negotiate , pt uses std cane to amb , SW following case ; pt has a HCP Jeff Caceres [phone] caregiver ID pt lives alone in second floor apt with a full flight of steps with HR to negotiate , pt uses std cane to amb , SW following case ; pt has a HCP Jeff Caceres [phone] caregiver ID pt lives alone in second floor apt with a full flight of steps with HR to negotiate , pt uses std cane to amb , SW following case ; pt has a HCP Jeff Caceres [phone] caregiver ID

## 2023-09-10 NOTE — DIETITIAN INITIAL EVALUATION ADULT - ORAL INTAKE PTA/DIET HISTORY
PTA pt reports varying PO intake; reports typically eating 3 small meals per day, states portion sizes have decreased over time with age. Opts for "light" food options such as reduced sugar juice. Endorses use of multivitamin and iron PTA. NKFA or intolerances reported. Pt denies any difficulty chewing/swallowing food at this time.

## 2023-09-10 NOTE — DIETITIAN INITIAL EVALUATION ADULT - REASON FOR ADMISSION
Pt is a 73-year-old female with history of HTN, CAD and unclear open heart surgery (per patient may be CABG, on  daily), DM, hypothyroid, and recent atraumatic right pelvic fracture (unclear exact fracture per patient but seems to indicate pubic rami) who presents with worsening right lower back pain

## 2023-09-10 NOTE — DIETITIAN INITIAL EVALUATION ADULT - ENTER TO (G/KG)
Sunscreen Recommendations  I recommended a broad spectrum sunscreen with a SPF of 30 or higher that is water-resistant. SPF 30 sunscreens block approximately 97 percent of the sun's harmful rays.   Sunscreens should be applied at least 15 minutes prior to expected sun exposure and then every 90 minutes after that as long as sun exposure continues.   If swimming or exercising sunscreen should be reapplied every 45 minutes to an hour after getting wet or sweating.  One ounce, or the equivalent of a shot glass full of sunscreen, is adequate to protect the skin not covered by a bathing suit.   I also recommended a lip balm with a sunscreen as well.   Healthy Sun Protective Behaviors  Sun protective clothing can be used in lieu of sunscreen but must be worn the entire time you are exposed to the sun's rays.  Seek shade between 10 a.m. and 2 p.m.  Use extra caution near water, snow, or sand as they reflect sun rays  Avoid tanning beds and consider sunless self-tanning products instead  Perform monthly self skin exams    The ABCDEs of Melanoma  Asymmetry - when one side is unlike the other  Border - irregular  Color - different shades of colors that can be black, brown, tan, white, red, grey, or blue  Diameter - as big as or larger than the size of a pencil eraser (6mm)  Evolving - changing in size, color, or shape or stands out from the rest of your moles    
1.4

## 2023-09-10 NOTE — PHYSICAL THERAPY INITIAL EVALUATION ADULT - PERTINENT HX OF CURRENT PROBLEM, REHAB EVAL
73-year-old female with history of HTN, CAD and unclear open heart surgery (per patient may be CABG, on  daily), DM, hypothyroid, and recent atraumatic right pelvic fracture (unclear exact fracture per patient but seems to indicate pubic rami) who presents with worsening right lower back pain which began approximately 4 to 5 days ago.  Patient states pain was mild initially at onset has been constant worsening since onset and denies any trauma or specific eliciting event.  No fever.  No numbness or weakness in extremities.  No loss of control of bowels or bladder.  No urinary symptoms no diarrhea.  No abdominal pain.  No nausea or vomiting. Further history from patient had right pelvic fracture which is likely his pubic rami though have no imaging in EMR and unclear history per patient.  Per her history no trauma or falls.   mid back stage I on mark   ORTHO to CONSULT   pt await Lumbar Spine MRI and results   await pelvic XRAY and results 73-year-old female with history of HTN, CAD and unclear open heart surgery (per patient may be CABG, on  daily), DM, hypothyroid, and recent atraumatic right pelvic fracture (unclear exact fracture per patient but seems to indicate pubic rami) who presents with worsening right lower back pain which began approximately 4 to 5 days ago.  Patient states pain was mild initially at onset has been constant worsening since onset and denies any trauma or specific eliciting event.  No fever.  No numbness or weakness in extremities.  No loss of control of bowels or bladder.  No urinary symptoms no diarrhea.  No abdominal pain.  No nausea or vomiting. Further history from patient had right pelvic fracture which is likely his pubic rami though have no imaging in EMR and unclear history per patient.  Per her history no trauma or falls.   CT LUMBAR SPINE 9/8/23 :Lumbar vertebral body heights are maintained. No vertebral fracture is seen. No destructive bone lesion is found.  Alignment is preserved.  Facet joints appear intact and aligned. There is multilevel intervertebral disc height loss and endplate spondylolytic changes. There is multilevel disc degenerative vacuum phenomenon. Multilevel posterior disc osteophyte complexes result in multilevel neural foraminal stenosis without high-grade spinal canal stenosis.     CT ABD 9/8/23 : old R sup/inf pubic rami fx   mid back stage I on mark   ORTHO to CONSULT   pt await Lumbar Spine MRI and results   await pelvic XRAY and results 73-year-old female with history of HTN, CAD and unclear open heart surgery (per patient may be CABG, on  daily), DM, hypothyroid, and recent atraumatic right pelvic fracture (unclear exact fracture per patient but seems to indicate pubic rami) who presents with worsening right lower back pain which began approximately 4 to 5 days ago.  Patient states pain was mild initially at onset has been constant worsening since onset and denies any trauma or specific eliciting event.  No fever.  No numbness or weakness in extremities.  No loss of control of bowels or bladder.  No urinary symptoms no diarrhea.  No abdominal pain.  No nausea or vomiting. Further history from patient had right pelvic fracture which is likely his pubic rami though have no imaging in EMR and unclear history per patient.  Per her history no trauma or falls.   CT LUMBAR SPINE 9/8/23 :Lumbar vertebral body heights are maintained. No vertebral fracture is seen. No destructive bone lesion is found.  Alignment is preserved.  Facet joints appear intact and aligned. There is multilevel intervertebral disc height loss and endplate spondylolytic changes. There is multilevel disc degenerative vacuum phenomenon. Multilevel posterior disc osteophyte complexes result in multilevel neural foraminal stenosis without high-grade spinal canal stenosis.     CT ABD 9/8/23 : old R sup/inf pubic rami fx   mid back stage I on mark   ORTHO to CONSULT   pt await Lumbar Spine MRI and results   await pelvic XRAY and results  MR LUMBAR SPINE 9/10/23 :  Right sacral edema pattern, suspect stress fracture 2. Grade 1 degenerative anterior listhesis L3 on L4  3. Widespread intermediate grade lumbar degenerative disc disease, see   above detail

## 2023-09-10 NOTE — DIETITIAN INITIAL EVALUATION ADULT - PERTINENT LABORATORY DATA
09-10    128<L>  |  92<L>  |  38<H>  ----------------------------<  156<H>  4.5   |  19<L>  |  1.56<H>    Ca    10.3      10 Sep 2023 06:59    POCT Blood Glucose.: 165 mg/dL (09-10-23 @ 08:54)  A1C with Estimated Average Glucose Result: 6.0 % (09-08-23 @ 06:58)

## 2023-09-10 NOTE — PHYSICAL THERAPY INITIAL EVALUATION ADULT - PHYSICAL ASSIST/NONPHYSICAL ASSIST: SIT/STAND, REHAB EVAL
You can access the FollowMyHealth Patient Portal offered by [facility] by registering at the following website: [email or web address]. By joining [facility]’s FollowMyHealth portal, you will also be able to view your health information using other applications (apps) compatible with our system.
supervision/verbal cues/nonverbal cues (demo/gestures)/1 person assist

## 2023-09-10 NOTE — DIETITIAN INITIAL EVALUATION ADULT - PERSON TAUGHT/METHOD
Provided recommendations to optimize PO and protein intake, recommended small frequent meals by ordering nutrient-dense snacks and leaving non-perishable food away from tray for later consumption during the day or between meals, to start with protein; reviewed protein sources, encouraged protein intake at each meal for BG management and recovery./verbal instruction/teach back - (Patient repeats in own words)/patient instructed

## 2023-09-11 LAB
ANION GAP SERPL CALC-SCNC: 16 MMOL/L — SIGNIFICANT CHANGE UP (ref 5–17)
BUN SERPL-MCNC: 44 MG/DL — HIGH (ref 7–23)
CALCIUM SERPL-MCNC: 9.9 MG/DL — SIGNIFICANT CHANGE UP (ref 8.4–10.5)
CHLORIDE SERPL-SCNC: 92 MMOL/L — LOW (ref 96–108)
CO2 SERPL-SCNC: 19 MMOL/L — LOW (ref 22–31)
CREAT SERPL-MCNC: 1.92 MG/DL — HIGH (ref 0.5–1.3)
EGFR: 27 ML/MIN/1.73M2 — LOW
GLUCOSE BLDC GLUCOMTR-MCNC: 179 MG/DL — HIGH (ref 70–99)
GLUCOSE BLDC GLUCOMTR-MCNC: 201 MG/DL — HIGH (ref 70–99)
GLUCOSE BLDC GLUCOMTR-MCNC: 275 MG/DL — HIGH (ref 70–99)
GLUCOSE SERPL-MCNC: 162 MG/DL — HIGH (ref 70–99)
HCT VFR BLD CALC: 33.5 % — LOW (ref 34.5–45)
HGB BLD-MCNC: 11.4 G/DL — LOW (ref 11.5–15.5)
MCHC RBC-ENTMCNC: 31.4 PG — SIGNIFICANT CHANGE UP (ref 27–34)
MCHC RBC-ENTMCNC: 34 GM/DL — SIGNIFICANT CHANGE UP (ref 32–36)
MCV RBC AUTO: 92.3 FL — SIGNIFICANT CHANGE UP (ref 80–100)
NRBC # BLD: 0 /100 WBCS — SIGNIFICANT CHANGE UP (ref 0–0)
PLATELET # BLD AUTO: 334 K/UL — SIGNIFICANT CHANGE UP (ref 150–400)
POTASSIUM SERPL-MCNC: 4.5 MMOL/L — SIGNIFICANT CHANGE UP (ref 3.5–5.3)
POTASSIUM SERPL-SCNC: 4.5 MMOL/L — SIGNIFICANT CHANGE UP (ref 3.5–5.3)
RBC # BLD: 3.63 M/UL — LOW (ref 3.8–5.2)
RBC # FLD: 14.4 % — SIGNIFICANT CHANGE UP (ref 10.3–14.5)
SODIUM SERPL-SCNC: 127 MMOL/L — LOW (ref 135–145)
WBC # BLD: 10.79 K/UL — HIGH (ref 3.8–10.5)
WBC # FLD AUTO: 10.79 K/UL — HIGH (ref 3.8–10.5)

## 2023-09-11 RX ORDER — ENOXAPARIN SODIUM 100 MG/ML
30 INJECTION SUBCUTANEOUS EVERY 24 HOURS
Refills: 0 | Status: DISCONTINUED | OUTPATIENT
Start: 2023-09-12 | End: 2023-09-21

## 2023-09-11 RX ADMIN — Medication 25 MILLIGRAM(S): at 22:15

## 2023-09-11 RX ADMIN — Medication 325 MILLIGRAM(S): at 13:41

## 2023-09-11 RX ADMIN — Medication 300 MILLIGRAM(S): at 13:41

## 2023-09-11 RX ADMIN — Medication 1 TABLET(S): at 13:41

## 2023-09-11 RX ADMIN — Medication 650 MILLIGRAM(S): at 01:15

## 2023-09-11 RX ADMIN — Medication 75 MICROGRAM(S): at 10:15

## 2023-09-11 RX ADMIN — Medication 40 MILLIGRAM(S): at 06:51

## 2023-09-11 RX ADMIN — Medication 6: at 10:14

## 2023-09-11 RX ADMIN — Medication 650 MILLIGRAM(S): at 01:45

## 2023-09-11 RX ADMIN — ENOXAPARIN SODIUM 40 MILLIGRAM(S): 100 INJECTION SUBCUTANEOUS at 13:41

## 2023-09-11 RX ADMIN — AMLODIPINE BESYLATE 5 MILLIGRAM(S): 2.5 TABLET ORAL at 06:51

## 2023-09-11 RX ADMIN — ATORVASTATIN CALCIUM 40 MILLIGRAM(S): 80 TABLET, FILM COATED ORAL at 22:15

## 2023-09-11 RX ADMIN — Medication 500 MILLIGRAM(S): at 13:41

## 2023-09-11 RX ADMIN — SPIRONOLACTONE 50 MILLIGRAM(S): 25 TABLET, FILM COATED ORAL at 06:51

## 2023-09-11 NOTE — PROGRESS NOTE ADULT - SUBJECTIVE AND OBJECTIVE BOX
Patient is a 73y old  Female who presents with a chief complaint of Pt is a 73-year-old female with history of HTN, CAD and unclear open heart surgery (per patient may be CABG, on  daily), DM, hypothyroid, and recent atraumatic right pelvic fracture (unclear exact fracture per patient but seems to indicate pubic rami) who presents with worsening right lower back pain     (10 Sep 2023 10:59)      SUBJECTIVE / OVERNIGHT EVENTS: c/o back pain.  Review of Systems  chest pain no  palpitations no  sob no  nausea no  headache no    MEDICATIONS  (STANDING):  allopurinol 300 milliGRAM(s) Oral daily  amLODIPine   Tablet 5 milliGRAM(s) Oral daily  ascorbic acid 500 milliGRAM(s) Oral daily  aspirin 325 milliGRAM(s) Oral daily  atorvastatin 40 milliGRAM(s) Oral at bedtime  dextrose 5%. 1000 milliLiter(s) (100 mL/Hr) IV Continuous <Continuous>  dextrose 5%. 1000 milliLiter(s) (50 mL/Hr) IV Continuous <Continuous>  dextrose 50% Injectable 25 Gram(s) IV Push once  dextrose 50% Injectable 12.5 Gram(s) IV Push once  dextrose 50% Injectable 25 Gram(s) IV Push once  enoxaparin Injectable 40 milliGRAM(s) SubCutaneous every 24 hours  glucagon  Injectable 1 milliGRAM(s) IntraMuscular once  insulin lispro (ADMELOG) corrective regimen sliding scale   SubCutaneous three times a day before meals  insulin lispro (ADMELOG) corrective regimen sliding scale   SubCutaneous at bedtime  levothyroxine 75 MICROGram(s) Oral <User Schedule>  levothyroxine 50 MICROGram(s) Oral <User Schedule>  metoprolol tartrate 25 milliGRAM(s) Oral at bedtime  multivitamin 1 Tablet(s) Oral daily    MEDICATIONS  (PRN):  acetaminophen     Tablet .. 650 milliGRAM(s) Oral every 6 hours PRN Temp greater or equal to 38.5C (101.3F), Mild Pain (1 - 3)  dextrose Oral Gel 15 Gram(s) Oral once PRN Blood Glucose LESS THAN 70 milliGRAM(s)/deciliter  oxycodone    5 mG/acetaminophen 325 mG 1 Tablet(s) Oral every 6 hours PRN Moderate Pain (4 - 6)      Vital Signs Last 24 Hrs  T(C): 36.8 (11 Sep 2023 12:09), Max: 36.9 (10 Sep 2023 21:37)  T(F): 98.3 (11 Sep 2023 12:09), Max: 98.4 (10 Sep 2023 21:37)  HR: 61 (11 Sep 2023 12:09) (56 - 74)  BP: 135/77 (11 Sep 2023 12:09) (135/77 - 160/73)  BP(mean): --  RR: 18 (11 Sep 2023 12:09) (17 - 18)  SpO2: 99% (11 Sep 2023 12:09) (98% - 99%)    Parameters below as of 11 Sep 2023 12:09  Patient On (Oxygen Delivery Method): room air        PHYSICAL EXAM:  GENERAL: NAD, well-developed  HEAD:  Atraumatic, Normocephalic  EYES: EOMI, PERRLA, conjunctiva and sclera clear  NECK: Supple, No JVD  CHEST/LUNG: Clear to auscultation bilaterally; No wheeze  HEART: Regular rate and rhythm; No murmurs, rubs, or gallops  ABDOMEN: Soft, Nontender, Nondistended; Bowel sounds present  EXTREMITIES:  2+ Peripheral Pulses, No clubbing, cyanosis, or edema  PSYCH: AAOx3  NEUROLOGY: non-focal  SKIN: No rashes or lesions    LABS:                        11.4   10.79 )-----------( 334      ( 11 Sep 2023 07:08 )             33.5     09-11    127<L>  |  92<L>  |  44<H>  ----------------------------<  162<H>  4.5   |  19<L>  |  1.92<H>    Ca    9.9      11 Sep 2023 07:09            Urinalysis Basic - ( 11 Sep 2023 07:09 )    Color: x / Appearance: x / SG: x / pH: x  Gluc: 162 mg/dL / Ketone: x  / Bili: x / Urobili: x   Blood: x / Protein: x / Nitrite: x   Leuk Esterase: x / RBC: x / WBC x   Sq Epi: x / Non Sq Epi: x / Bacteria: x          RADIOLOGY & ADDITIONAL TESTS:    Imaging Personally Reviewed:  < from: MR Lumbar Spine No Cont (09.10.23 @ 12:04) >  IMPRESSION:    1. Right sacral edema pattern, suspect stress fracture    2. Grade 1 degenerative anterior listhesis L3 on L4    3. Widespread intermediate grade lumbar degenerative disc disease, see   above detail    < end of copied text >  < from: Xray Pelvis 3 Views (09.09.23 @ 10:59) >  IMPRESSION:  Healing previously seen right puboacetabular junction and mid right   inferior pubic ramus fracture deformities with surrounding callus.   Grossly unchanged configuration without progressive displacement. No   dislocations or additional fractures.    Symmetrically aligned and spaced SI joints pubic symphysis without   cephalocaudal or anteroposterior malalignment.    Preserved bilateral hip joint spaces and no gross radiographic evidence   for AVN.    No discrete lytic or blastic lesions.    < end of copied text >    Consultant(s) Notes Reviewed:      Care Discussed with Consultants/Other Providers:

## 2023-09-11 NOTE — PHARMACOTHERAPY INTERVENTION NOTE - COMMENTS
Active medication list reviewed.    Patient noted to have rising SCr.  Today's Scr 1.92 and creatinine clearance 26ml/min.  Recommendation to lower enoxaparin dose from 40mg once daily to 30mg once daily for DVT prophylaxis due to reduced clearance below 30ml/min.  Will continue to monitor SCr.     Kimberly Wayne, PharmD, [facility]  Clinical Pharmacy Specialist  Available on Teams   Active medication list reviewed.    Patient noted to have rising SCr.  Today's Scr 1.92 and creatinine clearance 26ml/min.  Recommendation to lower enoxaparin dose from 40mg once daily to 30mg once daily for DVT prophylaxis due to reduced clearance below 30ml/min.  Will continue to monitor SCr.     Kimberly Wayne, PharmD, [facility]  Clinical Pharmacy Specialist  Available on Teams   Active medication list reviewed.    Patient noted to have rising SCr.  Today's Scr 1.92 and creatinine clearance 26ml/min.  Recommendation to lower enoxaparin dose from 40mg once daily to 30mg once daily for DVT prophylaxis due to reduced clearance below 30ml/min.  Will continue to monitor SCr.     Kimberly Wayne, PharmD, [facility]  Clinical Pharmacy Specialist  Available on Teams

## 2023-09-11 NOTE — PROGRESS NOTE ADULT - SUBJECTIVE AND OBJECTIVE BOX
Cardiovascular Disease Progress Note    Overnight events: No acute events overnight.  no new cardiac sx  Otherwise review of systems negative    Objective Findings:  T(C): 36.4 (09-11-23 @ 04:15), Max: 36.9 (09-10-23 @ 21:37)  HR: 56 (09-11-23 @ 04:15) (56 - 74)  BP: 153/79 (09-11-23 @ 04:15) (147/69 - 160/73)  RR: 18 (09-11-23 @ 04:15) (17 - 18)  SpO2: 99% (09-11-23 @ 04:15) (98% - 99%)  Wt(kg): --  Daily     Daily       Physical Exam:  Gen: NAD  HEENT: EOMI  CV: RRR, normal S1 + S2, no m/r/g  Lungs: CTAB  Abd: soft, non-tender  Ext: No edema    Telemetry:    Laboratory Data:                        11.4   11.62 )-----------( 380      ( 10 Sep 2023 06:50 )             34.4     09-10    128<L>  |  92<L>  |  38<H>  ----------------------------<  156<H>  4.5   |  19<L>  |  1.56<H>    Ca    10.3      10 Sep 2023 06:59                Inpatient Medications:  MEDICATIONS  (STANDING):  allopurinol 300 milliGRAM(s) Oral daily  amLODIPine   Tablet 5 milliGRAM(s) Oral daily  ascorbic acid 500 milliGRAM(s) Oral daily  aspirin 325 milliGRAM(s) Oral daily  atorvastatin 40 milliGRAM(s) Oral at bedtime  dextrose 5%. 1000 milliLiter(s) (100 mL/Hr) IV Continuous <Continuous>  dextrose 5%. 1000 milliLiter(s) (50 mL/Hr) IV Continuous <Continuous>  dextrose 50% Injectable 25 Gram(s) IV Push once  dextrose 50% Injectable 12.5 Gram(s) IV Push once  dextrose 50% Injectable 25 Gram(s) IV Push once  enoxaparin Injectable 40 milliGRAM(s) SubCutaneous every 24 hours  furosemide    Tablet 40 milliGRAM(s) Oral daily  glucagon  Injectable 1 milliGRAM(s) IntraMuscular once  insulin lispro (ADMELOG) corrective regimen sliding scale   SubCutaneous three times a day before meals  insulin lispro (ADMELOG) corrective regimen sliding scale   SubCutaneous at bedtime  levothyroxine 50 MICROGram(s) Oral <User Schedule>  levothyroxine 75 MICROGram(s) Oral <User Schedule>  metoprolol tartrate 25 milliGRAM(s) Oral at bedtime  multivitamin 1 Tablet(s) Oral daily  spironolactone 50 milliGRAM(s) Oral daily      Assessment:  73-year-old female with history of HTN, CAD, carotid artery disease, DM, hypothyroid, and recent atraumatic right pelvic fracture (unclear exact fracture per patient but seems to indicate pubic rami) who presents with worsening right lower back pain which began approximately 4 to 5 days ago.  Patient states pain was mild initially at onset has been constant worsening since onset and denies any trauma or specific eliciting event.  No fever.  No numbness or weakness in extremities.  No loss of control of bowels or bladder.  No urinary symptoms no diarrhea.  No abdominal pain.  No nausea or vomiting. Further history from patient had right pelvic fracture which is likely his pubic rami though have no imaging in EMR and unclear history per patient.  Per her history no trauma or falls.    Recs:  cardiac stable  no e/o acs or chf  cw asa and cont with statin give hx of cad/pad  cw antihypertensives and diuretics  pain control  ortho eval  f/u mri spine  will follow        Over 25 minutes spent on total encounter; more than 50% of the visit was spent counseling and/or coordinating care by the attending physician.      Carlos Diego MD   Cardiovascular Disease  [phone] Cardiovascular Disease Progress Note    Overnight events: No acute events overnight.  no new cardiac sx  Otherwise review of systems negative    Objective Findings:  T(C): 36.4 (09-11-23 @ 04:15), Max: 36.9 (09-10-23 @ 21:37)  HR: 56 (09-11-23 @ 04:15) (56 - 74)  BP: 153/79 (09-11-23 @ 04:15) (147/69 - 160/73)  RR: 18 (09-11-23 @ 04:15) (17 - 18)  SpO2: 99% (09-11-23 @ 04:15) (98% - 99%)  Wt(kg): --  Daily     Daily       Physical Exam:  Gen: NAD  HEENT: EOMI  CV: RRR, normal S1 + S2, no m/r/g  Lungs: CTAB  Abd: soft, non-tender  Ext: No edema    Telemetry:    Laboratory Data:                        11.4   11.62 )-----------( 380      ( 10 Sep 2023 06:50 )             34.4     09-10    128<L>  |  92<L>  |  38<H>  ----------------------------<  156<H>  4.5   |  19<L>  |  1.56<H>    Ca    10.3      10 Sep 2023 06:59                Inpatient Medications:  MEDICATIONS  (STANDING):  allopurinol 300 milliGRAM(s) Oral daily  amLODIPine   Tablet 5 milliGRAM(s) Oral daily  ascorbic acid 500 milliGRAM(s) Oral daily  aspirin 325 milliGRAM(s) Oral daily  atorvastatin 40 milliGRAM(s) Oral at bedtime  dextrose 5%. 1000 milliLiter(s) (100 mL/Hr) IV Continuous <Continuous>  dextrose 5%. 1000 milliLiter(s) (50 mL/Hr) IV Continuous <Continuous>  dextrose 50% Injectable 25 Gram(s) IV Push once  dextrose 50% Injectable 12.5 Gram(s) IV Push once  dextrose 50% Injectable 25 Gram(s) IV Push once  enoxaparin Injectable 40 milliGRAM(s) SubCutaneous every 24 hours  furosemide    Tablet 40 milliGRAM(s) Oral daily  glucagon  Injectable 1 milliGRAM(s) IntraMuscular once  insulin lispro (ADMELOG) corrective regimen sliding scale   SubCutaneous three times a day before meals  insulin lispro (ADMELOG) corrective regimen sliding scale   SubCutaneous at bedtime  levothyroxine 50 MICROGram(s) Oral <User Schedule>  levothyroxine 75 MICROGram(s) Oral <User Schedule>  metoprolol tartrate 25 milliGRAM(s) Oral at bedtime  multivitamin 1 Tablet(s) Oral daily  spironolactone 50 milliGRAM(s) Oral daily      Assessment:  73-year-old female with history of HTN, CAD, carotid artery disease, DM, hypothyroid, and recent atraumatic right pelvic fracture (unclear exact fracture per patient but seems to indicate pubic rami) who presents with worsening right lower back pain which began approximately 4 to 5 days ago.  Patient states pain was mild initially at onset has been constant worsening since onset and denies any trauma or specific eliciting event.  No fever.  No numbness or weakness in extremities.  No loss of control of bowels or bladder.  No urinary symptoms no diarrhea.  No abdominal pain.  No nausea or vomiting. Further history from patient had right pelvic fracture which is likely his pubic rami though have no imaging in EMR and unclear history per patient.  Per her history no trauma or falls.    Recs:  cardiac stable  no e/o acs or chf  cw asa and cont with statin give hx of cad/pad  cw antihypertensives and diuretics  pain control  ortho eval  f/u mri spine  will follow        Over 25 minutes spent on total encounter; more than 50% of the visit was spent counseling and/or coordinating care by the attending physician.      Carlos Diego MD   Cardiovascular Disease  [phone] Cardiovascular Disease Progress Note    Overnight events: No acute events overnight.  no new cardiac sx  Otherwise review of systems negative    Objective Findings:  T(C): 36.4 (09-11-23 @ 04:15), Max: 36.9 (09-10-23 @ 21:37)  HR: 56 (09-11-23 @ 04:15) (56 - 74)  BP: 153/79 (09-11-23 @ 04:15) (147/69 - 160/73)  RR: 18 (09-11-23 @ 04:15) (17 - 18)  SpO2: 99% (09-11-23 @ 04:15) (98% - 99%)  Wt(kg): --  Daily     Daily       Physical Exam:  Gen: NAD  HEENT: EOMI  CV: RRR, normal S1 + S2, no m/r/g  Lungs: CTAB  Abd: soft, non-tender  Ext: No edema    Telemetry:    Laboratory Data:                        11.4   11.62 )-----------( 380      ( 10 Sep 2023 06:50 )             34.4     09-10    128<L>  |  92<L>  |  38<H>  ----------------------------<  156<H>  4.5   |  19<L>  |  1.56<H>    Ca    10.3      10 Sep 2023 06:59                Inpatient Medications:  MEDICATIONS  (STANDING):  allopurinol 300 milliGRAM(s) Oral daily  amLODIPine   Tablet 5 milliGRAM(s) Oral daily  ascorbic acid 500 milliGRAM(s) Oral daily  aspirin 325 milliGRAM(s) Oral daily  atorvastatin 40 milliGRAM(s) Oral at bedtime  dextrose 5%. 1000 milliLiter(s) (100 mL/Hr) IV Continuous <Continuous>  dextrose 5%. 1000 milliLiter(s) (50 mL/Hr) IV Continuous <Continuous>  dextrose 50% Injectable 25 Gram(s) IV Push once  dextrose 50% Injectable 12.5 Gram(s) IV Push once  dextrose 50% Injectable 25 Gram(s) IV Push once  enoxaparin Injectable 40 milliGRAM(s) SubCutaneous every 24 hours  furosemide    Tablet 40 milliGRAM(s) Oral daily  glucagon  Injectable 1 milliGRAM(s) IntraMuscular once  insulin lispro (ADMELOG) corrective regimen sliding scale   SubCutaneous three times a day before meals  insulin lispro (ADMELOG) corrective regimen sliding scale   SubCutaneous at bedtime  levothyroxine 50 MICROGram(s) Oral <User Schedule>  levothyroxine 75 MICROGram(s) Oral <User Schedule>  metoprolol tartrate 25 milliGRAM(s) Oral at bedtime  multivitamin 1 Tablet(s) Oral daily  spironolactone 50 milliGRAM(s) Oral daily      Assessment:  73-year-old female with history of HTN, CAD, carotid artery disease, DM, hypothyroid, and recent atraumatic right pelvic fracture (unclear exact fracture per patient but seems to indicate pubic rami) who presents with worsening right lower back pain which began approximately 4 to 5 days ago.  Patient states pain was mild initially at onset has been constant worsening since onset and denies any trauma or specific eliciting event.  No fever.  No numbness or weakness in extremities.  No loss of control of bowels or bladder.  No urinary symptoms no diarrhea.  No abdominal pain.  No nausea or vomiting. Further history from patient had right pelvic fracture which is likely his pubic rami though have no imaging in EMR and unclear history per patient.  Per her history no trauma or falls.    Recs:  cardiac stable  no e/o acs or chf  cw asa and cont with statin give hx of cad/pad  cw antihypertensives and diuretics  pain control  ortho eval  f/u mri spine  will follow        Over 25 minutes spent on total encounter; more than 50% of the visit was spent counseling and/or coordinating care by the attending physician.      Carlos Diego MD   Cardiovascular Disease  [phone]

## 2023-09-11 NOTE — PROGRESS NOTE ADULT - ASSESSMENT
73 f with    Back pain/ Possible sacral fracture  - pain control  - MRI LS spine noted  - PT  - Neurosurgery evaluation called. Recomend CT pelvis..    Hypercalcemia  - follow  - PTH  - Vit D    CAD (coronary artery disease)   - stable  - Echo  - Cardiology evaluation dr Diego     Diabetes mellitus   - ADA diet  - BS control    H/O pericardiotomy   - Echo    History of gout   - continue Rx  - Uric acid    Hyperlipidemia   - stable    Hypertension   - control    Hypothyroidism   - supplement  - follow TSH    JONATHAN  - follow Cr, lytes  - Hold Lasix and Aldactone.    DVT prophylaxis     Guido Rolon MD phone [phone]      73 f with    Back pain/ Possible sacral fracture  - pain control  - MRI LS spine noted  - PT  - Neurosurgery evaluation called. Recomend CT pelvis..    Hypercalcemia  - follow  - PTH  - Vit D    CAD (coronary artery disease)   - stable  - Echo  - Cardiology evaluation dr Diego     Diabetes mellitus   - ADA diet  - BS control    H/O pericardiotomy   - Echo    History of gout   - continue Rx  - Uric acid    Hyperlipidemia   - stable    Hypertension   - control    Hypothyroidism   - supplement  - follow TSH    JONATHAN  - follow Cr, lytes  - Hold Lasix and Aldactone.    DVT prophylaxis     Guido Rolon MD phone [phone]      73 f with    Back pain/ Possible sacral fracture  - pain control  - MRI LS spine noted  - PT  - Neurosurgery evaluation called. Recomend CT pelvis..    Hypercalcemia  - follow  - PTH  - Vit D    CAD (coronary artery disease)   - stable  - Echo  - Cardiology evaluation dr Diego     Diabetes mellitus   - ADA diet  - BS control    H/O pericardiotomy   - Echo    History of gout   - continue Rx  - Uric acid    Hyperlipidemia   - stable    Hypertension   - control    Hypothyroidism   - supplement  - follow TSH    JONATHAN  - follow Cr, lytes  - Hold Lasix and Aldactone.    DVT prophylaxis     Guido Rolon MD phone [phone]

## 2023-09-11 NOTE — PROGRESS NOTE ADULT - NUTRITIONAL ASSESSMENT
This patient has been assessed with a concern for Malnutrition and has been determined to have a diagnosis/diagnoses of Severe protein-calorie malnutrition.    This patient is being managed with:   Diet Regular-  Consistent Carbohydrate {Evening Snack} (CSTCHOSN)  Entered: Sep  7 2023  9:51PM

## 2023-09-12 LAB
ANION GAP SERPL CALC-SCNC: 18 MMOL/L — HIGH (ref 5–17)
BUN SERPL-MCNC: 42 MG/DL — HIGH (ref 7–23)
CALCIUM SERPL-MCNC: 9.7 MG/DL — SIGNIFICANT CHANGE UP (ref 8.4–10.5)
CHLORIDE SERPL-SCNC: 90 MMOL/L — LOW (ref 96–108)
CO2 SERPL-SCNC: 18 MMOL/L — LOW (ref 22–31)
CREAT SERPL-MCNC: 1.45 MG/DL — HIGH (ref 0.5–1.3)
EGFR: 38 ML/MIN/1.73M2 — LOW
GLUCOSE BLDC GLUCOMTR-MCNC: 154 MG/DL — HIGH (ref 70–99)
GLUCOSE BLDC GLUCOMTR-MCNC: 175 MG/DL — HIGH (ref 70–99)
GLUCOSE BLDC GLUCOMTR-MCNC: 178 MG/DL — HIGH (ref 70–99)
GLUCOSE BLDC GLUCOMTR-MCNC: 228 MG/DL — HIGH (ref 70–99)
GLUCOSE SERPL-MCNC: 163 MG/DL — HIGH (ref 70–99)
HCT VFR BLD CALC: 36.4 % — SIGNIFICANT CHANGE UP (ref 34.5–45)
HGB BLD-MCNC: 12.1 G/DL — SIGNIFICANT CHANGE UP (ref 11.5–15.5)
MCHC RBC-ENTMCNC: 31.2 PG — SIGNIFICANT CHANGE UP (ref 27–34)
MCHC RBC-ENTMCNC: 33.2 GM/DL — SIGNIFICANT CHANGE UP (ref 32–36)
MCV RBC AUTO: 93.8 FL — SIGNIFICANT CHANGE UP (ref 80–100)
NRBC # BLD: 0 /100 WBCS — SIGNIFICANT CHANGE UP (ref 0–0)
PLATELET # BLD AUTO: 376 K/UL — SIGNIFICANT CHANGE UP (ref 150–400)
POTASSIUM SERPL-MCNC: 4.5 MMOL/L — SIGNIFICANT CHANGE UP (ref 3.5–5.3)
POTASSIUM SERPL-SCNC: 4.5 MMOL/L — SIGNIFICANT CHANGE UP (ref 3.5–5.3)
RBC # BLD: 3.88 M/UL — SIGNIFICANT CHANGE UP (ref 3.8–5.2)
RBC # FLD: 14.6 % — HIGH (ref 10.3–14.5)
SODIUM SERPL-SCNC: 126 MMOL/L — LOW (ref 135–145)
WBC # BLD: 14.5 K/UL — HIGH (ref 3.8–10.5)
WBC # FLD AUTO: 14.5 K/UL — HIGH (ref 3.8–10.5)

## 2023-09-12 PROCEDURE — 76376 3D RENDER W/INTRP POSTPROCES: CPT | Mod: 26

## 2023-09-12 PROCEDURE — 72192 CT PELVIS W/O DYE: CPT | Mod: 26

## 2023-09-12 RX ADMIN — OXYCODONE AND ACETAMINOPHEN 1 TABLET(S): 5; 325 TABLET ORAL at 14:13

## 2023-09-12 RX ADMIN — AMLODIPINE BESYLATE 5 MILLIGRAM(S): 2.5 TABLET ORAL at 05:41

## 2023-09-12 RX ADMIN — Medication 2: at 08:53

## 2023-09-12 RX ADMIN — Medication 75 MICROGRAM(S): at 08:49

## 2023-09-12 RX ADMIN — Medication 4: at 18:09

## 2023-09-12 RX ADMIN — Medication 300 MILLIGRAM(S): at 11:53

## 2023-09-12 RX ADMIN — Medication 2: at 12:51

## 2023-09-12 RX ADMIN — Medication 25 MILLIGRAM(S): at 21:44

## 2023-09-12 RX ADMIN — ATORVASTATIN CALCIUM 40 MILLIGRAM(S): 80 TABLET, FILM COATED ORAL at 21:46

## 2023-09-12 RX ADMIN — Medication 650 MILLIGRAM(S): at 21:44

## 2023-09-12 RX ADMIN — Medication 650 MILLIGRAM(S): at 22:15

## 2023-09-12 RX ADMIN — Medication 325 MILLIGRAM(S): at 11:53

## 2023-09-12 RX ADMIN — ENOXAPARIN SODIUM 30 MILLIGRAM(S): 100 INJECTION SUBCUTANEOUS at 05:42

## 2023-09-12 RX ADMIN — Medication 1 TABLET(S): at 11:53

## 2023-09-12 RX ADMIN — Medication 500 MILLIGRAM(S): at 11:53

## 2023-09-12 RX ADMIN — OXYCODONE AND ACETAMINOPHEN 1 TABLET(S): 5; 325 TABLET ORAL at 11:55

## 2023-09-12 NOTE — PROGRESS NOTE ADULT - ASSESSMENT
73 f with    Back pain/ Sacral fracture  - pain control  - MRI LS spine noted  - CT pelvis noted  - PT  - Neurosurgery evaluation noted. No intervention     Hypercalcemia  - follow  - PTH  - Vit D    CAD (coronary artery disease)   - stable  - Echo  - Cardiology evaluation dr Diego     Diabetes mellitus   - ADA diet  - BS control    H/O pericardiotomy   - Echo    History of gout   - continue Rx  - Uric acid    Hyperlipidemia   - stable    Hypertension   - control    Hypothyroidism   - supplement  - follow TSH    JOANTHAN  - follow Cr, lytes  - Hold Lasix and Aldactone.    Hyponatremia  - stable     DVT prophylaxis     DCP in progress     Guido Rolon MD phone [phone]      73 f with    Back pain/ Sacral fracture  - pain control  - MRI LS spine noted  - CT pelvis noted  - PT  - Neurosurgery evaluation noted. No intervention     Hypercalcemia  - follow  - PTH  - Vit D    CAD (coronary artery disease)   - stable  - Echo  - Cardiology evaluation dr Diego     Diabetes mellitus   - ADA diet  - BS control    H/O pericardiotomy   - Echo    History of gout   - continue Rx  - Uric acid    Hyperlipidemia   - stable    Hypertension   - control    Hypothyroidism   - supplement  - follow TSH    JONATHAN  - follow Cr, lytes  - Hold Lasix and Aldactone.    Hyponatremia  - stable     DVT prophylaxis     DCP in progress     Guido Rolon MD phone [phone]      73 f with    Back pain/ Sacral fracture  - pain control  - MRI LS spine noted  - CT pelvis noted  - PT  - Neurosurgery evaluation noted. No intervention     Hypercalcemia  - follow  - PTH  - Vit D    CAD (coronary artery disease)   - stable  - Echo  - Cardiology evaluation dr Diego     Diabetes mellitus   - ADA diet  - BS control    H/O pericardiotomy   - Echo    History of gout   - continue Rx  - Uric acid    Hyperlipidemia   - stable    Hypertension   - control    Hypothyroidism   - supplement  - follow TSH    JONATHAN  - follow Cr, lytes  - Hold Lasix and Aldactone.    Hyponatremia  - stable     DVT prophylaxis     DCP in progress     Guido Rolon MD phone [phone]

## 2023-09-12 NOTE — PROGRESS NOTE ADULT - SUBJECTIVE AND OBJECTIVE BOX
Patient is a 73y old  Female who presents with a chief complaint of Pt is a 73-year-old female with history of HTN, CAD and unclear open heart surgery (per patient may be CABG, on  daily), DM, hypothyroid, and recent atraumatic right pelvic fracture (unclear exact fracture per patient but seems to indicate pubic rami) who presents with worsening right lower back pain     (10 Sep 2023 10:59)      SUBJECTIVE / OVERNIGHT EVENTS: No new complaints.   Review of Systems  chest pain no  palpitations no  sob no  nausea no  headache no    MEDICATIONS  (STANDING):  allopurinol 300 milliGRAM(s) Oral daily  amLODIPine   Tablet 5 milliGRAM(s) Oral daily  ascorbic acid 500 milliGRAM(s) Oral daily  aspirin 325 milliGRAM(s) Oral daily  atorvastatin 40 milliGRAM(s) Oral at bedtime  dextrose 5%. 1000 milliLiter(s) (50 mL/Hr) IV Continuous <Continuous>  dextrose 5%. 1000 milliLiter(s) (100 mL/Hr) IV Continuous <Continuous>  dextrose 50% Injectable 25 Gram(s) IV Push once  dextrose 50% Injectable 12.5 Gram(s) IV Push once  dextrose 50% Injectable 25 Gram(s) IV Push once  enoxaparin Injectable 30 milliGRAM(s) SubCutaneous every 24 hours  glucagon  Injectable 1 milliGRAM(s) IntraMuscular once  insulin lispro (ADMELOG) corrective regimen sliding scale   SubCutaneous three times a day before meals  insulin lispro (ADMELOG) corrective regimen sliding scale   SubCutaneous at bedtime  levothyroxine 50 MICROGram(s) Oral <User Schedule>  levothyroxine 75 MICROGram(s) Oral <User Schedule>  metoprolol tartrate 25 milliGRAM(s) Oral at bedtime  multivitamin 1 Tablet(s) Oral daily    MEDICATIONS  (PRN):  acetaminophen     Tablet .. 650 milliGRAM(s) Oral every 6 hours PRN Temp greater or equal to 38.5C (101.3F), Mild Pain (1 - 3)  dextrose Oral Gel 15 Gram(s) Oral once PRN Blood Glucose LESS THAN 70 milliGRAM(s)/deciliter  oxycodone    5 mG/acetaminophen 325 mG 1 Tablet(s) Oral every 6 hours PRN Moderate Pain (4 - 6)      Vital Signs Last 24 Hrs  T(C): 36.4 (12 Sep 2023 13:08), Max: 36.8 (11 Sep 2023 20:43)  T(F): 97.5 (12 Sep 2023 13:08), Max: 98.3 (11 Sep 2023 20:43)  HR: 61 (12 Sep 2023 13:08) (51 - 62)  BP: 134/85 (12 Sep 2023 13:08) (134/85 - 149/61)  BP(mean): --  RR: 18 (12 Sep 2023 13:08) (18 - 18)  SpO2: 96% (12 Sep 2023 13:08) (96% - 100%)    Parameters below as of 12 Sep 2023 13:08  Patient On (Oxygen Delivery Method): room air        PHYSICAL EXAM:  GENERAL: NAD, well-developed  HEAD:  Atraumatic, Normocephalic  EYES: EOMI, PERRLA, conjunctiva and sclera clear  NECK: Supple, No JVD  CHEST/LUNG: Clear to auscultation bilaterally; No wheeze  HEART: Regular rate and rhythm; No murmurs, rubs, or gallops  ABDOMEN: Soft, Nontender, Nondistended; Bowel sounds present  EXTREMITIES:  2+ Peripheral Pulses, No clubbing, cyanosis, or edema  PSYCH: AAOx3  NEUROLOGY: non-focal  SKIN: No rashes or lesions    LABS:                        12.1   14.50 )-----------( 376      ( 12 Sep 2023 07:20 )             36.4     09-12    126<L>  |  90<L>  |  42<H>  ----------------------------<  163<H>  4.5   |  18<L>  |  1.45<H>    Ca    9.7      12 Sep 2023 07:19            Urinalysis Basic - ( 12 Sep 2023 07:19 )    Color: x / Appearance: x / SG: x / pH: x  Gluc: 163 mg/dL / Ketone: x  / Bili: x / Urobili: x   Blood: x / Protein: x / Nitrite: x   Leuk Esterase: x / RBC: x / WBC x   Sq Epi: x / Non Sq Epi: x / Bacteria: x          RADIOLOGY & ADDITIONAL TESTS:    Imaging Personally Reviewed:  < from: CT Pelvis Bony Only No Cont (09.12.23 @ 08:54) >  IMPRESSION:      Findings consistent with a nondisplaced fracture ofthe right sacral ala,   better appreciated on previously performed MRI.  Subacute to chronic fractures of the right superior and inferior pubic   rami.    < end of copied text >    Consultant(s) Notes Reviewed:      Care Discussed with Consultants/Other Providers:

## 2023-09-12 NOTE — PROGRESS NOTE ADULT - SUBJECTIVE AND OBJECTIVE BOX
Cardiovascular Disease Progress Note    Overnight events: No acute events overnight.  no new cardiac sx  Otherwise review of systems negative    Objective Findings:  T(C): 36.8 (09-12-23 @ 06:15), Max: 36.8 (09-11-23 @ 12:09)  HR: 58 (09-12-23 @ 07:05) (51 - 62)  BP: 149/61 (09-12-23 @ 06:15) (135/77 - 149/61)  RR: 18 (09-12-23 @ 06:15) (18 - 18)  SpO2: 100% (09-12-23 @ 06:15) (98% - 100%)  Wt(kg): --  Daily     Daily       Physical Exam:  Gen: NAD  HEENT: EOMI  CV: RRR, normal S1 + S2, no m/r/g  Lungs: CTAB  Abd: soft, non-tender  Ext: No edema    Telemetry:    Laboratory Data:                        11.4   10.79 )-----------( 334      ( 11 Sep 2023 07:08 )             33.5     09-11    127<L>  |  92<L>  |  44<H>  ----------------------------<  162<H>  4.5   |  19<L>  |  1.92<H>    Ca    9.9      11 Sep 2023 07:09                Inpatient Medications:  MEDICATIONS  (STANDING):  allopurinol 300 milliGRAM(s) Oral daily  amLODIPine   Tablet 5 milliGRAM(s) Oral daily  ascorbic acid 500 milliGRAM(s) Oral daily  aspirin 325 milliGRAM(s) Oral daily  atorvastatin 40 milliGRAM(s) Oral at bedtime  dextrose 5%. 1000 milliLiter(s) (100 mL/Hr) IV Continuous <Continuous>  dextrose 5%. 1000 milliLiter(s) (50 mL/Hr) IV Continuous <Continuous>  dextrose 50% Injectable 25 Gram(s) IV Push once  dextrose 50% Injectable 12.5 Gram(s) IV Push once  dextrose 50% Injectable 25 Gram(s) IV Push once  enoxaparin Injectable 30 milliGRAM(s) SubCutaneous every 24 hours  glucagon  Injectable 1 milliGRAM(s) IntraMuscular once  insulin lispro (ADMELOG) corrective regimen sliding scale   SubCutaneous three times a day before meals  insulin lispro (ADMELOG) corrective regimen sliding scale   SubCutaneous at bedtime  levothyroxine 75 MICROGram(s) Oral <User Schedule>  levothyroxine 50 MICROGram(s) Oral <User Schedule>  metoprolol tartrate 25 milliGRAM(s) Oral at bedtime  multivitamin 1 Tablet(s) Oral daily      Assessment:  73-year-old female with history of HTN, CAD, carotid artery disease, DM, hypothyroid, and recent atraumatic right pelvic fracture (unclear exact fracture per patient but seems to indicate pubic rami) who presents with worsening right lower back pain which began approximately 4 to 5 days ago.  Patient states pain was mild initially at onset has been constant worsening since onset and denies any trauma or specific eliciting event.  No fever.  No numbness or weakness in extremities.  No loss of control of bowels or bladder.  No urinary symptoms no diarrhea.  No abdominal pain.  No nausea or vomiting. Further history from patient had right pelvic fracture which is likely his pubic rami though have no imaging in EMR and unclear history per patient.  Per her history no trauma or falls.    Recs:  cardiac stable  no e/o acs or chf  cw asa and cont with statin give hx of cad/pad  cw antihypertensives and diuretics  pain control  ortho eval  s/p mri spine, results noted  will follow          Over 25 minutes spent on total encounter; more than 50% of the visit was spent counseling and/or coordinating care by the attending physician.      Carlos Diego MD   Cardiovascular Disease  [phone] Cardiovascular Disease Progress Note    Overnight events: No acute events overnight.  no new cardiac sx  Otherwise review of systems negative    Objective Findings:  T(C): 36.8 (09-12-23 @ 06:15), Max: 36.8 (09-11-23 @ 12:09)  HR: 58 (09-12-23 @ 07:05) (51 - 62)  BP: 149/61 (09-12-23 @ 06:15) (135/77 - 149/61)  RR: 18 (09-12-23 @ 06:15) (18 - 18)  SpO2: 100% (09-12-23 @ 06:15) (98% - 100%)  Wt(kg): --  Daily     Daily       Physical Exam:  Gen: NAD  HEENT: EOMI  CV: RRR, normal S1 + S2, no m/r/g  Lungs: CTAB  Abd: soft, non-tender  Ext: No edema    Telemetry:    Laboratory Data:                        11.4   10.79 )-----------( 334      ( 11 Sep 2023 07:08 )             33.5     09-11    127<L>  |  92<L>  |  44<H>  ----------------------------<  162<H>  4.5   |  19<L>  |  1.92<H>    Ca    9.9      11 Sep 2023 07:09                Inpatient Medications:  MEDICATIONS  (STANDING):  allopurinol 300 milliGRAM(s) Oral daily  amLODIPine   Tablet 5 milliGRAM(s) Oral daily  ascorbic acid 500 milliGRAM(s) Oral daily  aspirin 325 milliGRAM(s) Oral daily  atorvastatin 40 milliGRAM(s) Oral at bedtime  dextrose 5%. 1000 milliLiter(s) (100 mL/Hr) IV Continuous <Continuous>  dextrose 5%. 1000 milliLiter(s) (50 mL/Hr) IV Continuous <Continuous>  dextrose 50% Injectable 25 Gram(s) IV Push once  dextrose 50% Injectable 12.5 Gram(s) IV Push once  dextrose 50% Injectable 25 Gram(s) IV Push once  enoxaparin Injectable 30 milliGRAM(s) SubCutaneous every 24 hours  glucagon  Injectable 1 milliGRAM(s) IntraMuscular once  insulin lispro (ADMELOG) corrective regimen sliding scale   SubCutaneous three times a day before meals  insulin lispro (ADMELOG) corrective regimen sliding scale   SubCutaneous at bedtime  levothyroxine 75 MICROGram(s) Oral <User Schedule>  levothyroxine 50 MICROGram(s) Oral <User Schedule>  metoprolol tartrate 25 milliGRAM(s) Oral at bedtime  multivitamin 1 Tablet(s) Oral daily      Assessment:  73-year-old female with history of HTN, CAD, carotid artery disease, DM, hypothyroid, and recent atraumatic right pelvic fracture (unclear exact fracture per patient but seems to indicate pubic rami) who presents with worsening right lower back pain which began approximately 4 to 5 days ago.  Patient states pain was mild initially at onset has been constant worsening since onset and denies any trauma or specific eliciting event.  No fever.  No numbness or weakness in extremities.  No loss of control of bowels or bladder.  No urinary symptoms no diarrhea.  No abdominal pain.  No nausea or vomiting. Further history from patient had right pelvic fracture which is likely his pubic rami though have no imaging in EMR and unclear history per patient.  Per her history no trauma or falls.    Recs:  cardiac stable  no e/o acs or chf  cw asa and cont with statin give hx of cad/pad  cw antihypertensives and diuretics  pain control  ortho eval  s/p mri spine, results noted  will follow          Over 25 minutes spent on total encounter; more than 50% of the visit was spent counseling and/or coordinating care by the attending physician.      Carlos Diego MD   Cardiovascular Disease  [phone] Cardiovascular Disease Progress Note    Overnight events: No acute events overnight.  no new cardiac sx  Otherwise review of systems negative    Objective Findings:  T(C): 36.8 (09-12-23 @ 06:15), Max: 36.8 (09-11-23 @ 12:09)  HR: 58 (09-12-23 @ 07:05) (51 - 62)  BP: 149/61 (09-12-23 @ 06:15) (135/77 - 149/61)  RR: 18 (09-12-23 @ 06:15) (18 - 18)  SpO2: 100% (09-12-23 @ 06:15) (98% - 100%)  Wt(kg): --  Daily     Daily       Physical Exam:  Gen: NAD  HEENT: EOMI  CV: RRR, normal S1 + S2, no m/r/g  Lungs: CTAB  Abd: soft, non-tender  Ext: No edema    Telemetry:    Laboratory Data:                        11.4   10.79 )-----------( 334      ( 11 Sep 2023 07:08 )             33.5     09-11    127<L>  |  92<L>  |  44<H>  ----------------------------<  162<H>  4.5   |  19<L>  |  1.92<H>    Ca    9.9      11 Sep 2023 07:09                Inpatient Medications:  MEDICATIONS  (STANDING):  allopurinol 300 milliGRAM(s) Oral daily  amLODIPine   Tablet 5 milliGRAM(s) Oral daily  ascorbic acid 500 milliGRAM(s) Oral daily  aspirin 325 milliGRAM(s) Oral daily  atorvastatin 40 milliGRAM(s) Oral at bedtime  dextrose 5%. 1000 milliLiter(s) (100 mL/Hr) IV Continuous <Continuous>  dextrose 5%. 1000 milliLiter(s) (50 mL/Hr) IV Continuous <Continuous>  dextrose 50% Injectable 25 Gram(s) IV Push once  dextrose 50% Injectable 12.5 Gram(s) IV Push once  dextrose 50% Injectable 25 Gram(s) IV Push once  enoxaparin Injectable 30 milliGRAM(s) SubCutaneous every 24 hours  glucagon  Injectable 1 milliGRAM(s) IntraMuscular once  insulin lispro (ADMELOG) corrective regimen sliding scale   SubCutaneous three times a day before meals  insulin lispro (ADMELOG) corrective regimen sliding scale   SubCutaneous at bedtime  levothyroxine 75 MICROGram(s) Oral <User Schedule>  levothyroxine 50 MICROGram(s) Oral <User Schedule>  metoprolol tartrate 25 milliGRAM(s) Oral at bedtime  multivitamin 1 Tablet(s) Oral daily      Assessment:  73-year-old female with history of HTN, CAD, carotid artery disease, DM, hypothyroid, and recent atraumatic right pelvic fracture (unclear exact fracture per patient but seems to indicate pubic rami) who presents with worsening right lower back pain which began approximately 4 to 5 days ago.  Patient states pain was mild initially at onset has been constant worsening since onset and denies any trauma or specific eliciting event.  No fever.  No numbness or weakness in extremities.  No loss of control of bowels or bladder.  No urinary symptoms no diarrhea.  No abdominal pain.  No nausea or vomiting. Further history from patient had right pelvic fracture which is likely his pubic rami though have no imaging in EMR and unclear history per patient.  Per her history no trauma or falls.    Recs:  cardiac stable  no e/o acs or chf  cw asa and cont with statin give hx of cad/pad  cw antihypertensives and diuretics  pain control  ortho eval  s/p mri spine, results noted  will follow          Over 25 minutes spent on total encounter; more than 50% of the visit was spent counseling and/or coordinating care by the attending physician.      Carlos Diego MD   Cardiovascular Disease  [phone]

## 2023-09-12 NOTE — CONSULT NOTE ADULT - SUBJECTIVE AND OBJECTIVE BOX
p (1480)     HPI: 73F h/o HTN, CAD on [id number], DM, hypothyroid, recent dx known R pelvic fx p/f worsening R LBP. Pt today c/o mild nonradicular chronic back pain. Denies saddle anesthesia, urine/bowel incontinence. CT pelvis w/ R nondisplaced fx of sacral ala + pubic rami, MRI w/ R sacral edema c/f recent stress fx.     Exam: Intact. KELLER 5/5, SILT, no clonus.    --Anticoagulation:  enoxaparin Injectable 30 milliGRAM(s) SubCutaneous every 24 hours    =====================  PAST MEDICAL HISTORY   Diabetes mellitus    CAD (coronary artery disease)    H/O pericardiotomy    History of gout    Hypertension    Hypothyroidism    Hyperlipidemia      PAST SURGICAL HISTORY     penicillin (Unknown)      MEDICATIONS:  Antibiotics:    Neuro:  acetaminophen     Tablet .. 650 milliGRAM(s) Oral every 6 hours PRN  aspirin 325 milliGRAM(s) Oral daily  oxycodone    5 mG/acetaminophen 325 mG 1 Tablet(s) Oral every 6 hours PRN    Other:  allopurinol 300 milliGRAM(s) Oral daily  amLODIPine   Tablet 5 milliGRAM(s) Oral daily  ascorbic acid 500 milliGRAM(s) Oral daily  atorvastatin 40 milliGRAM(s) Oral at bedtime  dextrose 5%. 1000 milliLiter(s) IV Continuous <Continuous>  dextrose 5%. 1000 milliLiter(s) IV Continuous <Continuous>  dextrose 50% Injectable 25 Gram(s) IV Push once  dextrose 50% Injectable 12.5 Gram(s) IV Push once  dextrose 50% Injectable 25 Gram(s) IV Push once  dextrose Oral Gel 15 Gram(s) Oral once PRN  glucagon  Injectable 1 milliGRAM(s) IntraMuscular once  insulin lispro (ADMELOG) corrective regimen sliding scale   SubCutaneous three times a day before meals  insulin lispro (ADMELOG) corrective regimen sliding scale   SubCutaneous at bedtime  levothyroxine 50 MICROGram(s) Oral <User Schedule>  levothyroxine 75 MICROGram(s) Oral <User Schedule>  metoprolol tartrate 25 milliGRAM(s) Oral at bedtime  multivitamin 1 Tablet(s) Oral daily      SOCIAL HISTORY:   Occupation:   Marital Status:     FAMILY HISTORY:      ROS: Negative except per HPI    LABS:                          12.1   14.50 )-----------( 376      ( 12 Sep 2023 07:20 )             36.4     09-12    126<L>  |  90<L>  |  42<H>  ----------------------------<  163<H>  4.5   |  18<L>  |  1.45<H>    Ca    9.7      12 Sep 2023 07:19         p (1480)     HPI: 73F h/o HTN, CAD on [id number], DM, hypothyroid, recent dx known R pelvic fx p/f worsening R LBP. Pt today c/o mild nonradicular chronic back pain. Denies saddle anesthesia, urine/bowel incontinence. CT pelvis w/ R nondisplaced fx of sacral ala + pubic rami, MRI w/ R sacral edema c/f recent stress fx.     Exam: Intact. KELLER 5/5, SILT, no clonus.    --Anticoagulation:  enoxaparin Injectable 30 milliGRAM(s) SubCutaneous every 24 hours    =====================  PAST MEDICAL HISTORY   Diabetes mellitus    CAD (coronary artery disease)    H/O pericardiotomy    History of gout    Hypertension    Hypothyroidism    Hyperlipidemia      PAST SURGICAL HISTORY     penicillin (Unknown)      MEDICATIONS:  Antibiotics:    Neuro:  acetaminophen     Tablet .. 650 milliGRAM(s) Oral every 6 hours PRN  aspirin 325 milliGRAM(s) Oral daily  oxycodone    5 mG/acetaminophen 325 mG 1 Tablet(s) Oral every 6 hours PRN    Other:  allopurinol 300 milliGRAM(s) Oral daily  amLODIPine   Tablet 5 milliGRAM(s) Oral daily  ascorbic acid 500 milliGRAM(s) Oral daily  atorvastatin 40 milliGRAM(s) Oral at bedtime  dextrose 5%. 1000 milliLiter(s) IV Continuous <Continuous>  dextrose 5%. 1000 milliLiter(s) IV Continuous <Continuous>  dextrose 50% Injectable 25 Gram(s) IV Push once  dextrose 50% Injectable 12.5 Gram(s) IV Push once  dextrose 50% Injectable 25 Gram(s) IV Push once  dextrose Oral Gel 15 Gram(s) Oral once PRN  glucagon  Injectable 1 milliGRAM(s) IntraMuscular once  insulin lispro (ADMELOG) corrective regimen sliding scale   SubCutaneous three times a day before meals  insulin lispro (ADMELOG) corrective regimen sliding scale   SubCutaneous at bedtime  levothyroxine 50 MICROGram(s) Oral <User Schedule>  levothyroxine 75 MICROGram(s) Oral <User Schedule>  metoprolol tartrate 25 milliGRAM(s) Oral at bedtime  multivitamin 1 Tablet(s) Oral daily      SOCIAL HISTORY:   Occupation:   Marital Status:     FAMILY HISTORY:      ROS: Negative except per HPI    LABS:                          12.1   14.50 )-----------( 376      ( 12 Sep 2023 07:20 )             36.4     09-12    126<L>  |  90<L>  |  42<H>  ----------------------------<  163<H>  4.5   |  18<L>  |  1.45<H>    Ca    9.7      12 Sep 2023 07:19         p (1480)     HPI: 73F h/o HTN, CAD on [id number], DM, hypothyroid, recent dx known R pelvic fx p/f worsening R LBP. Pt today c/o mild nonradicular chronic back pain. Denies saddle anesthesia, urine/bowel incontinence. CT pelvis w/ R nondisplaced fx of sacral ala + pubic rami, MRI w/ R sacral edema c/f recent stress fx.     Exam: Intact. KELLER 5/5, SILT, no clonus.    --Anticoagulation:  enoxaparin Injectable 30 milliGRAM(s) SubCutaneous every 24 hours    =====================  PAST MEDICAL HISTORY   Diabetes mellitus    CAD (coronary artery disease)    H/O pericardiotomy    History of gout    Hypertension    Hypothyroidism    Hyperlipidemia      PAST SURGICAL HISTORY     penicillin (Unknown)      MEDICATIONS:  Antibiotics:    Neuro:  acetaminophen     Tablet .. 650 milliGRAM(s) Oral every 6 hours PRN  aspirin 325 milliGRAM(s) Oral daily  oxycodone    5 mG/acetaminophen 325 mG 1 Tablet(s) Oral every 6 hours PRN    Other:  allopurinol 300 milliGRAM(s) Oral daily  amLODIPine   Tablet 5 milliGRAM(s) Oral daily  ascorbic acid 500 milliGRAM(s) Oral daily  atorvastatin 40 milliGRAM(s) Oral at bedtime  dextrose 5%. 1000 milliLiter(s) IV Continuous <Continuous>  dextrose 5%. 1000 milliLiter(s) IV Continuous <Continuous>  dextrose 50% Injectable 25 Gram(s) IV Push once  dextrose 50% Injectable 12.5 Gram(s) IV Push once  dextrose 50% Injectable 25 Gram(s) IV Push once  dextrose Oral Gel 15 Gram(s) Oral once PRN  glucagon  Injectable 1 milliGRAM(s) IntraMuscular once  insulin lispro (ADMELOG) corrective regimen sliding scale   SubCutaneous three times a day before meals  insulin lispro (ADMELOG) corrective regimen sliding scale   SubCutaneous at bedtime  levothyroxine 50 MICROGram(s) Oral <User Schedule>  levothyroxine 75 MICROGram(s) Oral <User Schedule>  metoprolol tartrate 25 milliGRAM(s) Oral at bedtime  multivitamin 1 Tablet(s) Oral daily      SOCIAL HISTORY:   Occupation:   Marital Status:     FAMILY HISTORY:      ROS: Negative except per HPI    LABS:                          12.1   14.50 )-----------( 376      ( 12 Sep 2023 07:20 )             36.4     09-12    126<L>  |  90<L>  |  42<H>  ----------------------------<  163<H>  4.5   |  18<L>  |  1.45<H>    Ca    9.7      12 Sep 2023 07:19

## 2023-09-12 NOTE — PROGRESS NOTE ADULT - NUTRITIONAL ASSESSMENT
This patient has been assessed with a concern for Malnutrition and has been determined to have a diagnosis/diagnoses of Severe protein-calorie malnutrition.    This patient is being managed with:   Diet Regular-  Consistent Carbohydrate {Evening Snack} (CSTCHOSN)  1000mL Fluid Restriction ([id number])  Entered: Sep 12 2023 12:26PM   This patient has been assessed with a concern for Malnutrition and has been determined to have a diagnosis/diagnoses of Severe protein-calorie malnutrition.    This patient is being managed with:   Diet Regular-  Consistent Carbohydrate {Evening Snack} (CSTCHOSN)  1000mL Fluid Restriction ([id number])  Entered: Sep 12 2023 12:26PM   This patient has been assessed with a concern for Malnutrition and has been determined to have a diagnosis/diagnoses of Severe protein-calorie malnutrition.    This patient is being managed with:   Diet Regular-  Consistent Carbohydrate {Evening Snack} (CSTCHOSN)  1000mL Fluid Restriction ([id number])  Entered: Sep 12 2023 12:26PM

## 2023-09-12 NOTE — CONSULT NOTE ADULT - ASSESSMENT
73F h/o HTN, CAD on [id number], DM, hypothyroid, recent dx known R pelvic fx p/f worsening R LBP. Pt today c/o mild nonradicular chronic back pain. Denies saddle anesthesia, urine/bowel incontinence. CT pelvis w/ R nondisplaced fx of sacral ala + pubic rami, MRI w/ R sacral edema c/f recent stress fx. Exam: Intact. KELLER 5/5, SILT, no clonus.  - No acute nsgy intervention   - Pain mgmt/PT per primary   - Can try Medrol dosepack upon d/c given edema 73F h/o HTN, CAD on [id number], DM, hypothyroid, recent dx known R pelvic fx p/f worsening R LBP. Pt today c/o mild nonradicular chronic back pain. Denies saddle anesthesia, urine/bowel incontinence. CT pelvis w/ R nondisplaced fx of sacral ala + pubic rami, MRI w/ R sacral edema c/f recent stress fx. Exam: Intact. KELLER 5/5, SILT, no clonus.  - No acute nsgy intervention   - Pain mgmt/PT per primary   - Can try Medrol dosepack upon d/c given edema 73F h/o HTN, CAD on [id number], DM, hypothyroid, recent dx known R pelvic fx p/f worsening R LBP. Pt today c/o mild nonradicular chronic back pain. Denies saddle anesthesia, urine/bowel incontinence. CT pelvis w/ R nondisplaced fx of sacral ala + pubic rami, MRI w/ R sacral edema c/f recent stress fx. Exam: Intact. KELLER 5/5, SILT, no clonus.  - No acute nsgy intervention   - Pain mgmt/PT per primary   - Can try Medrol dosepack upon d/c given edema 73F h/o HTN, CAD on [id number], DM, hypothyroid, recent dx known R pelvic fx p/f worsening R LBP. Pt today c/o mild nonradicular chronic back pain. Denies saddle anesthesia, urine/bowel incontinence. CT pelvis w/ R nondisplaced fx of sacral ala + pubic rami, MRI w/ R sacral edema c/f recent stress fx. Exam: Intact. KELLER 5/5, SILT, no clonus.  - No acute nsgy intervention   - Pain mgmt/PT per primary   - Can try Medrol dosepack upon d/c given edema  - If pain not controlled with conservative measures, can f/u outpatient with Dr. Boggs for consideration of sacroplasty  73F h/o HTN, CAD on [id number], DM, hypothyroid, recent dx known R pelvic fx p/f worsening R LBP. Pt today c/o mild nonradicular chronic back pain. Denies saddle anesthesia, urine/bowel incontinence. CT pelvis w/ R nondisplaced fx of sacral ala + pubic rami, MRI w/ R sacral edema c/f recent stress fx. Exam: Intact. KELLER 5/5, SILT, no clonus.  - No acute nsgy intervention   - Pain mgmt/PT per primary   - Can try Medrol dosepack upon d/c given edema  - If pain not controlled with conservative measures, can f/u outpatient with Dr. Boggs for consideration of sacroplasty  73F h/o HTN, CAD on [id number], DM, hypothyroid, recent dx known R pelvic fx p/f worsening R LBP. Pt today c/o mild nonradicular chronic back pain. Denies saddle anesthesia, urine/bowel incontinence. CT pelvis w/ R nondisplaced fx of sacral ala + pubic rami, MRI w/ R sacral edema c/f recent stress fx. Exam: Intact. KELLER 5/5, SILT, no clonus.  - No acute nsgy intervention   - Pain mgmt/PT per primary   - Can try Medrol dosepack upon d/c given edema  - If pain not controlled with conservative measures, can f/u outpatient with Dr. Boggs for consideration of sacroplasty

## 2023-09-13 LAB
ANION GAP SERPL CALC-SCNC: 18 MMOL/L — HIGH (ref 5–17)
BUN SERPL-MCNC: 32 MG/DL — HIGH (ref 7–23)
CALCIUM SERPL-MCNC: 9.7 MG/DL — SIGNIFICANT CHANGE UP (ref 8.4–10.5)
CHLORIDE SERPL-SCNC: 90 MMOL/L — LOW (ref 96–108)
CO2 SERPL-SCNC: 17 MMOL/L — LOW (ref 22–31)
CREAT SERPL-MCNC: 1.28 MG/DL — SIGNIFICANT CHANGE UP (ref 0.5–1.3)
EGFR: 44 ML/MIN/1.73M2 — LOW
GLUCOSE BLDC GLUCOMTR-MCNC: 126 MG/DL — HIGH (ref 70–99)
GLUCOSE BLDC GLUCOMTR-MCNC: 166 MG/DL — HIGH (ref 70–99)
GLUCOSE BLDC GLUCOMTR-MCNC: 170 MG/DL — HIGH (ref 70–99)
GLUCOSE BLDC GLUCOMTR-MCNC: 202 MG/DL — HIGH (ref 70–99)
GLUCOSE SERPL-MCNC: 237 MG/DL — HIGH (ref 70–99)
HCT VFR BLD CALC: 36 % — SIGNIFICANT CHANGE UP (ref 34.5–45)
HGB BLD-MCNC: 12 G/DL — SIGNIFICANT CHANGE UP (ref 11.5–15.5)
MCHC RBC-ENTMCNC: 31.3 PG — SIGNIFICANT CHANGE UP (ref 27–34)
MCHC RBC-ENTMCNC: 33.3 GM/DL — SIGNIFICANT CHANGE UP (ref 32–36)
MCV RBC AUTO: 93.8 FL — SIGNIFICANT CHANGE UP (ref 80–100)
NRBC # BLD: 0 /100 WBCS — SIGNIFICANT CHANGE UP (ref 0–0)
OSMOLALITY UR: 301 MOS/KG — SIGNIFICANT CHANGE UP (ref 300–900)
PLATELET # BLD AUTO: 370 K/UL — SIGNIFICANT CHANGE UP (ref 150–400)
POTASSIUM SERPL-MCNC: 4.9 MMOL/L — SIGNIFICANT CHANGE UP (ref 3.5–5.3)
POTASSIUM SERPL-SCNC: 4.9 MMOL/L — SIGNIFICANT CHANGE UP (ref 3.5–5.3)
RBC # BLD: 3.84 M/UL — SIGNIFICANT CHANGE UP (ref 3.8–5.2)
RBC # FLD: 14.5 % — SIGNIFICANT CHANGE UP (ref 10.3–14.5)
SODIUM SERPL-SCNC: 125 MMOL/L — LOW (ref 135–145)
SODIUM UR-SCNC: 86 MMOL/L — SIGNIFICANT CHANGE UP
WBC # BLD: 10.93 K/UL — HIGH (ref 3.8–10.5)
WBC # FLD AUTO: 10.93 K/UL — HIGH (ref 3.8–10.5)

## 2023-09-13 RX ORDER — SPIRONOLACTONE 25 MG/1
50 TABLET, FILM COATED ORAL DAILY
Refills: 0 | Status: DISCONTINUED | OUTPATIENT
Start: 2023-09-13 | End: 2023-09-14

## 2023-09-13 RX ORDER — ONDANSETRON 8 MG/1
4 TABLET, FILM COATED ORAL ONCE
Refills: 0 | Status: COMPLETED | OUTPATIENT
Start: 2023-09-13 | End: 2023-09-13

## 2023-09-13 RX ORDER — FUROSEMIDE 40 MG
40 TABLET ORAL DAILY
Refills: 0 | Status: DISCONTINUED | OUTPATIENT
Start: 2023-09-13 | End: 2023-09-14

## 2023-09-13 RX ADMIN — Medication 40 MILLIGRAM(S): at 08:58

## 2023-09-13 RX ADMIN — Medication 2: at 09:11

## 2023-09-13 RX ADMIN — Medication 325 MILLIGRAM(S): at 11:51

## 2023-09-13 RX ADMIN — Medication 500 MILLIGRAM(S): at 11:51

## 2023-09-13 RX ADMIN — Medication 4: at 13:09

## 2023-09-13 RX ADMIN — SPIRONOLACTONE 50 MILLIGRAM(S): 25 TABLET, FILM COATED ORAL at 08:57

## 2023-09-13 RX ADMIN — ONDANSETRON 4 MILLIGRAM(S): 8 TABLET, FILM COATED ORAL at 17:25

## 2023-09-13 RX ADMIN — Medication 75 MICROGRAM(S): at 08:58

## 2023-09-13 RX ADMIN — ENOXAPARIN SODIUM 30 MILLIGRAM(S): 100 INJECTION SUBCUTANEOUS at 05:37

## 2023-09-13 RX ADMIN — Medication 25 MILLIGRAM(S): at 21:21

## 2023-09-13 RX ADMIN — Medication 2: at 17:32

## 2023-09-13 RX ADMIN — AMLODIPINE BESYLATE 5 MILLIGRAM(S): 2.5 TABLET ORAL at 05:37

## 2023-09-13 RX ADMIN — Medication 300 MILLIGRAM(S): at 11:51

## 2023-09-13 RX ADMIN — ATORVASTATIN CALCIUM 40 MILLIGRAM(S): 80 TABLET, FILM COATED ORAL at 21:21

## 2023-09-13 RX ADMIN — Medication 1 TABLET(S): at 11:51

## 2023-09-13 NOTE — CHART NOTE - NSCHARTNOTEFT_GEN_A_CORE
Pt had an JONATHAN on admission and diuretics were held.  As per Cardiology, it is recommended to c/w diuretics.  Today JONATHAN is better but Na is trending down.      09-13    125<L>  |  90<L>  |  32<H>  ----------------------------<  237<H>  4.9   |  17<L>  |  1.28    Ca    9.7      13 Sep 2023 07:28        A/P:  Will restart Lasix and Spironolactone today   Monitor BMP in AM

## 2023-09-13 NOTE — PROGRESS NOTE ADULT - SUBJECTIVE AND OBJECTIVE BOX
Cardiovascular Disease Progress Note    Overnight events: No acute events overnight. no cp/sob/palps   Otherwise review of systems negative    Objective Findings:  T(C): 36.3 (09-13-23 @ 05:50), Max: 36.6 (09-12-23 @ 21:35)  HR: 66 (09-13-23 @ 05:50) (60 - 66)  BP: 153/78 (09-13-23 @ 05:50) (134/78 - 153/78)  RR: 18 (09-13-23 @ 05:50) (18 - 18)  SpO2: 96% (09-13-23 @ 05:50) (96% - 96%)  Wt(kg): --  Daily     Daily       Physical Exam:  Gen: NAD  HEENT: EOMI  CV: RRR, normal S1 + S2, no m/r/g  Lungs: CTAB  Abd: soft, non-tender  Ext: No edema    Telemetry:    Laboratory Data:                        12.1   14.50 )-----------( 376      ( 12 Sep 2023 07:20 )             36.4     09-12    126<L>  |  90<L>  |  42<H>  ----------------------------<  163<H>  4.5   |  18<L>  |  1.45<H>    Ca    9.7      12 Sep 2023 07:19                Inpatient Medications:  MEDICATIONS  (STANDING):  allopurinol 300 milliGRAM(s) Oral daily  amLODIPine   Tablet 5 milliGRAM(s) Oral daily  ascorbic acid 500 milliGRAM(s) Oral daily  aspirin 325 milliGRAM(s) Oral daily  atorvastatin 40 milliGRAM(s) Oral at bedtime  dextrose 5%. 1000 milliLiter(s) (100 mL/Hr) IV Continuous <Continuous>  dextrose 5%. 1000 milliLiter(s) (50 mL/Hr) IV Continuous <Continuous>  dextrose 50% Injectable 25 Gram(s) IV Push once  dextrose 50% Injectable 12.5 Gram(s) IV Push once  dextrose 50% Injectable 25 Gram(s) IV Push once  enoxaparin Injectable 30 milliGRAM(s) SubCutaneous every 24 hours  glucagon  Injectable 1 milliGRAM(s) IntraMuscular once  insulin lispro (ADMELOG) corrective regimen sliding scale   SubCutaneous three times a day before meals  insulin lispro (ADMELOG) corrective regimen sliding scale   SubCutaneous at bedtime  levothyroxine 50 MICROGram(s) Oral <User Schedule>  levothyroxine 75 MICROGram(s) Oral <User Schedule>  metoprolol tartrate 25 milliGRAM(s) Oral at bedtime  multivitamin 1 Tablet(s) Oral daily      Assessment:  73-year-old female with history of HTN, CAD, carotid artery disease, DM, hypothyroid, and recent atraumatic right pelvic fracture (unclear exact fracture per patient but seems to indicate pubic rami) who presents with worsening right lower back pain which began approximately 4 to 5 days ago.  Patient states pain was mild initially at onset has been constant worsening since onset and denies any trauma or specific eliciting event.  No fever.  No numbness or weakness in extremities.  No loss of control of bowels or bladder.  No urinary symptoms no diarrhea.  No abdominal pain.  No nausea or vomiting. Further history from patient had right pelvic fracture which is likely his pubic rami though have no imaging in EMR and unclear history per patient.  Per her history no trauma or falls.    Recs:  cardiac stable  no e/o acs or chf  cw asa and cont with statin give hx of cad/pad  cw antihypertensives and diuretics  pain control  ortho/nsgy eval - conservative management  s/p mri spine, results noted      Over 25 minutes spent on total encounter; more than 50% of the visit was spent counseling and/or coordinating care by the attending physician.      Carlos Diego MD   Cardiovascular Disease  [phone] Cardiovascular Disease Progress Note    Overnight events: No acute events overnight. no cp/sob/palps   Otherwise review of systems negative    Objective Findings:  T(C): 36.3 (09-13-23 @ 05:50), Max: 36.6 (09-12-23 @ 21:35)  HR: 66 (09-13-23 @ 05:50) (60 - 66)  BP: 153/78 (09-13-23 @ 05:50) (134/78 - 153/78)  RR: 18 (09-13-23 @ 05:50) (18 - 18)  SpO2: 96% (09-13-23 @ 05:50) (96% - 96%)  Wt(kg): --  Daily     Daily       Physical Exam:  Gen: NAD  HEENT: EOMI  CV: RRR, normal S1 + S2, no m/r/g  Lungs: CTAB  Abd: soft, non-tender  Ext: No edema    Telemetry:    Laboratory Data:                        12.1   14.50 )-----------( 376      ( 12 Sep 2023 07:20 )             36.4     09-12    126<L>  |  90<L>  |  42<H>  ----------------------------<  163<H>  4.5   |  18<L>  |  1.45<H>    Ca    9.7      12 Sep 2023 07:19                Inpatient Medications:  MEDICATIONS  (STANDING):  allopurinol 300 milliGRAM(s) Oral daily  amLODIPine   Tablet 5 milliGRAM(s) Oral daily  ascorbic acid 500 milliGRAM(s) Oral daily  aspirin 325 milliGRAM(s) Oral daily  atorvastatin 40 milliGRAM(s) Oral at bedtime  dextrose 5%. 1000 milliLiter(s) (100 mL/Hr) IV Continuous <Continuous>  dextrose 5%. 1000 milliLiter(s) (50 mL/Hr) IV Continuous <Continuous>  dextrose 50% Injectable 25 Gram(s) IV Push once  dextrose 50% Injectable 12.5 Gram(s) IV Push once  dextrose 50% Injectable 25 Gram(s) IV Push once  enoxaparin Injectable 30 milliGRAM(s) SubCutaneous every 24 hours  glucagon  Injectable 1 milliGRAM(s) IntraMuscular once  insulin lispro (ADMELOG) corrective regimen sliding scale   SubCutaneous three times a day before meals  insulin lispro (ADMELOG) corrective regimen sliding scale   SubCutaneous at bedtime  levothyroxine 50 MICROGram(s) Oral <User Schedule>  levothyroxine 75 MICROGram(s) Oral <User Schedule>  metoprolol tartrate 25 milliGRAM(s) Oral at bedtime  multivitamin 1 Tablet(s) Oral daily      Assessment:  73-year-old female with history of HTN, CAD, carotid artery disease, DM, hypothyroid, and recent atraumatic right pelvic fracture (unclear exact fracture per patient but seems to indicate pubic rami) who presents with worsening right lower back pain which began approximately 4 to 5 days ago.  Patient states pain was mild initially at onset has been constant worsening since onset and denies any trauma or specific eliciting event.  No fever.  No numbness or weakness in extremities.  No loss of control of bowels or bladder.  No urinary symptoms no diarrhea.  No abdominal pain.  No nausea or vomiting. Further history from patient had right pelvic fracture which is likely his pubic rami though have no imaging in EMR and unclear history per patient.  Per her history no trauma or falls.    Recs:  cardiac stable  no e/o acs or chf  cw asa and cont with statin give hx of cad/pad  cw antihypertensives and diuretics  pain control  ortho/nsgy eval - conservative management  s/p mri spine, results noted      Over 25 minutes spent on total encounter; more than 50% of the visit was spent counseling and/or coordinating care by the attending physician.      Carlos Diego MD   Cardiovascular Disease  [phone] Cardiovascular Disease Progress Note    Overnight events: No acute events overnight. no cp/sob/palps   Otherwise review of systems negative    Objective Findings:  T(C): 36.3 (09-13-23 @ 05:50), Max: 36.6 (09-12-23 @ 21:35)  HR: 66 (09-13-23 @ 05:50) (60 - 66)  BP: 153/78 (09-13-23 @ 05:50) (134/78 - 153/78)  RR: 18 (09-13-23 @ 05:50) (18 - 18)  SpO2: 96% (09-13-23 @ 05:50) (96% - 96%)  Wt(kg): --  Daily     Daily       Physical Exam:  Gen: NAD  HEENT: EOMI  CV: RRR, normal S1 + S2, no m/r/g  Lungs: CTAB  Abd: soft, non-tender  Ext: No edema    Telemetry:    Laboratory Data:                        12.1   14.50 )-----------( 376      ( 12 Sep 2023 07:20 )             36.4     09-12    126<L>  |  90<L>  |  42<H>  ----------------------------<  163<H>  4.5   |  18<L>  |  1.45<H>    Ca    9.7      12 Sep 2023 07:19                Inpatient Medications:  MEDICATIONS  (STANDING):  allopurinol 300 milliGRAM(s) Oral daily  amLODIPine   Tablet 5 milliGRAM(s) Oral daily  ascorbic acid 500 milliGRAM(s) Oral daily  aspirin 325 milliGRAM(s) Oral daily  atorvastatin 40 milliGRAM(s) Oral at bedtime  dextrose 5%. 1000 milliLiter(s) (100 mL/Hr) IV Continuous <Continuous>  dextrose 5%. 1000 milliLiter(s) (50 mL/Hr) IV Continuous <Continuous>  dextrose 50% Injectable 25 Gram(s) IV Push once  dextrose 50% Injectable 12.5 Gram(s) IV Push once  dextrose 50% Injectable 25 Gram(s) IV Push once  enoxaparin Injectable 30 milliGRAM(s) SubCutaneous every 24 hours  glucagon  Injectable 1 milliGRAM(s) IntraMuscular once  insulin lispro (ADMELOG) corrective regimen sliding scale   SubCutaneous three times a day before meals  insulin lispro (ADMELOG) corrective regimen sliding scale   SubCutaneous at bedtime  levothyroxine 50 MICROGram(s) Oral <User Schedule>  levothyroxine 75 MICROGram(s) Oral <User Schedule>  metoprolol tartrate 25 milliGRAM(s) Oral at bedtime  multivitamin 1 Tablet(s) Oral daily      Assessment:  73-year-old female with history of HTN, CAD, carotid artery disease, DM, hypothyroid, and recent atraumatic right pelvic fracture (unclear exact fracture per patient but seems to indicate pubic rami) who presents with worsening right lower back pain which began approximately 4 to 5 days ago.  Patient states pain was mild initially at onset has been constant worsening since onset and denies any trauma or specific eliciting event.  No fever.  No numbness or weakness in extremities.  No loss of control of bowels or bladder.  No urinary symptoms no diarrhea.  No abdominal pain.  No nausea or vomiting. Further history from patient had right pelvic fracture which is likely his pubic rami though have no imaging in EMR and unclear history per patient.  Per her history no trauma or falls.    Recs:  cardiac stable  no e/o acs or chf  cw asa and cont with statin give hx of cad/pad  cw antihypertensives and diuretics  pain control  ortho/nsgy eval - conservative management  s/p mri spine, results noted      Over 25 minutes spent on total encounter; more than 50% of the visit was spent counseling and/or coordinating care by the attending physician.      Carlos Diego MD   Cardiovascular Disease  [phone]

## 2023-09-13 NOTE — CONSULT NOTE ADULT - ASSESSMENT
73-year-old female with history of HTN, CAD and unclear open heart surgery (per patient may be CABG, on  daily), DM, hypothyroid, and recent atraumatic right pelvic fracture  who presents with worsening right lower back pain      1- hyponatremia  2- ckd III   3- acidosis   4- HTN   5- hx chf    check pro bnp   started on lasix   check na level in am   ? samsca use  trend serum bicarb  level   cont norvasc 5 mg daily  primary team NP case d/w

## 2023-09-13 NOTE — PROGRESS NOTE ADULT - NUTRITIONAL ASSESSMENT
This patient has been assessed with a concern for Malnutrition and has been determined to have a diagnosis/diagnoses of Severe protein-calorie malnutrition.    This patient is being managed with:   Diet Regular-  Consistent Carbohydrate {Evening Snack} (CSTCHOSN)  1000mL Fluid Restriction ([id number])  Entered: Sep 12 2023 12:26PM   This patient has been assessed with a concern for Malnutrition and has been determined to have a diagnosis/diagnoses of Severe protein-calorie malnutrition.    This patient is being managed with:   Diet Regular-  Consistent Carbohydrate {Evening Snack} (CSTCHOSN)  1000mL Fluid Restriction ([id number])  Entered: Sep 12 2023 12:26PM   This patient has been assessed with a concern for Malnutrition and has been determined to have a diagnosis/diagnoses of Severe protein-calorie malnutrition.    This patient is being managed with:   Diet Regular-  Consistent Carbohydrate {Evening Snack} (CSTCHOSN)  1000mL Fluid Restriction ([id number])  Entered: Sep 12 2023 12:26PM

## 2023-09-13 NOTE — PROGRESS NOTE ADULT - ASSESSMENT
73 f with    Back pain/ Sacral fracture  - pain control  - MRI LS spine noted  - CT pelvis noted  - PT  - Neurosurgery evaluation noted. No intervention     Hypercalcemia  - follow  - PTH  - Vit D    CAD (coronary artery disease)   - stable  - Echo  - Cardiology evaluation dr Diego     Diabetes mellitus   - ADA diet  - BS control    H/O pericardiotomy   - Echo    History of gout   - continue Rx  - Uric acid    Hyperlipidemia   - stable    Hypertension   - control    Hypothyroidism   - supplement  - follow TSH    JONATHAN  - follow Cr, lytes  - Hold Lasix and Aldactone.    Hyponatremia  - Nephrology evaluation Dr. Varela    DVT prophylaxis     DCP in progress     Guido Rolon MD phone [phone]      73 f with    Back pain/ Sacral fracture  - pain control  - MRI LS spine noted  - CT pelvis noted  - PT  - Neurosurgery evaluation noted. No intervention     Hypercalcemia  - follow  - PTH  - Vit D    CAD (coronary artery disease)   - stable  - Echo  - Cardiology evaluation dr Diego     Diabetes mellitus   - ADA diet  - BS control    H/O pericardiotomy   - Echo    History of gout   - continue Rx  - Uric acid    Hyperlipidemia   - stable    Hypertension   - control    Hypothyroidism   - supplement  - follow TSH    JONATHAN  - follow Cr, lytes  - Hold Lasix and Aldactone.    Hyponatremia  - Nephrology evaluation Dr. Varela    DVT prophylaxis     DCP in progress     Guido Rolon MD phone [phone]      73 f with    Back pain/ Sacral fracture  - pain control  - MRI LS spine noted  - CT pelvis noted  - PT  - Neurosurgery evaluation noted. No intervention     Hypercalcemia  - follow  - PTH  - Vit D    CAD (coronary artery disease)   - stable  - Echo  - Cardiology evaluation dr Diego     Diabetes mellitus   - ADA diet  - BS control    H/O pericardiotomy   - Echo    History of gout   - continue Rx  - Uric acid    Hyperlipidemia   - stable    Hypertension   - control    Hypothyroidism   - supplement  - follow TSH    JONATHAN  - follow Cr, lytes  - Hold Lasix and Aldactone.    Hyponatremia  - Nephrology evaluation Dr. Varela    DVT prophylaxis     DCP in progress     Guido Rolon MD phone [phone]

## 2023-09-13 NOTE — CONSULT NOTE ADULT - SUBJECTIVE AND OBJECTIVE BOX
[facility]  [phone]  NEPHROLOGY      INITIAL CONSULT NOTE  --------------------------------------------------------------------------------  HPI:        73-year-old female with history of HTN, CAD and unclear open heart surgery (per patient may be CABG, on  daily), DM, hypothyroid, and recent atraumatic right pelvic fracture (unclear exact fracture per patient but seems to indicate pubic rami) who presents with worsening right lower back pain which began approximately 4 to 5 days PTA ago.  Patient states pain was mild initially at onset has been constant worsening since onset and denies any trauma or specific eliciting event.  No fever.  No numbness or weakness in extremities.  No loss of control of bowels or bladder.  No urinary symptoms no diarrhea. t.  Per her history no trauma or falls.  also noticed with hyponatremia. renal consult called.       PAST HISTORY  --------------------------------------------------------------------------------  PAST MEDICAL & SURGICAL HISTORY:  Diabetes mellitus      CAD (coronary artery disease)      H/O pericardiotomy      History of gout      Hypertension      Hypothyroidism      Hyperlipidemia        FAMILY HISTORY:    PAST SOCIAL HISTORY:    ALLERGIES & MEDICATIONS  --------------------------------------------------------------------------------  Allergies    penicillin (Unknown)    Intolerances      Standing Inpatient Medications  allopurinol 300 milliGRAM(s) Oral daily  amLODIPine   Tablet 5 milliGRAM(s) Oral daily  ascorbic acid 500 milliGRAM(s) Oral daily  aspirin 325 milliGRAM(s) Oral daily  atorvastatin 40 milliGRAM(s) Oral at bedtime  dextrose 5%. 1000 milliLiter(s) IV Continuous <Continuous>  dextrose 5%. 1000 milliLiter(s) IV Continuous <Continuous>  dextrose 50% Injectable 25 Gram(s) IV Push once  dextrose 50% Injectable 12.5 Gram(s) IV Push once  dextrose 50% Injectable 25 Gram(s) IV Push once  enoxaparin Injectable 30 milliGRAM(s) SubCutaneous every 24 hours  furosemide    Tablet 40 milliGRAM(s) Oral daily  glucagon  Injectable 1 milliGRAM(s) IntraMuscular once  insulin lispro (ADMELOG) corrective regimen sliding scale   SubCutaneous three times a day before meals  insulin lispro (ADMELOG) corrective regimen sliding scale   SubCutaneous at bedtime  levothyroxine 50 MICROGram(s) Oral <User Schedule>  levothyroxine 75 MICROGram(s) Oral <User Schedule>  metoprolol tartrate 25 milliGRAM(s) Oral at bedtime  multivitamin 1 Tablet(s) Oral daily  spironolactone 50 milliGRAM(s) Oral daily    PRN Inpatient Medications  acetaminophen     Tablet .. 650 milliGRAM(s) Oral every 6 hours PRN  dextrose Oral Gel 15 Gram(s) Oral once PRN  oxycodone    5 mG/acetaminophen 325 mG 1 Tablet(s) Oral every 6 hours PRN      REVIEW OF SYSTEMS  --------------------------------------------------------------------------------  Gen: No  fevers/chills   Skin: No itching or rash   Head/Eyes/Ears/Mouth: No headache; Normal hearing;  No sinus pain/discomfort, sore throat  Respiratory: No dyspnea, cough, wheezing, hemoptysis  CV: No chest pain, orthopnea  GI: No abdominal pain, diarrhea,  + nausea,  - vomiting,  denies melena, hematochezia  : No dysuria, decrease urination or hesitancy urinating  hematuria, nocturia  MSK: + sacral/pelvic area pain   Neuro: No dizziness/lightheadedness,  also with no edema         VITALS/PHYSICAL EXAM  --------------------------------------------------------------------------------  T(C): 36.2 (09-13-23 @ 20:32), Max: 36.8 (09-13-23 @ 10:07)  HR: 75 (09-13-23 @ 20:32) (58 - 75)  BP: 122/64 (09-13-23 @ 20:32) (122/64 - 153/78)  RR: 18 (09-13-23 @ 20:32) (18 - 18)  SpO2: 94% (09-13-23 @ 20:32) (94% - 98%)  Wt(kg): --        09-12-23 @ 07:01  -  09-13-23 @ 07:00  --------------------------------------------------------  IN: 800 mL / OUT: 250 mL / NET: 550 mL    09-13-23 @ 07:01  -  09-13-23 @ 23:20  --------------------------------------------------------  IN: 480 mL / OUT: 0 mL / NET: 480 mL      Physical Exam:  	Gen: Non toxic comfortable appearing   	no jvd    	Pulm: decrease bs  no rales or ronchi or wheezing  	CV: RRR, S1S2; no rub  	Back: No CVA tenderness; no sacral edema  	Abd: +BS, soft, nontender/nondistended  	: No suprapubic tenderness  	UE: Warm, no cyanosis  no clubbing,  no edema;  	LE: Warm, no cyanosis  no clubbing, no edema  	Neuro: alert and oriented. speech coherent   	Psych: Normal affect and mood  	Skin: Warm, no decrease skin turgor   	  LABS/STUDIES  --------------------------------------------------------------------------------              12.0   10.93 >-----------<  370      [09-13-23 @ 07:30]              36.0     125  |  90  |  32  ----------------------------<  237      [09-13-23 @ 07:28]  4.9   |  17  |  1.28        Ca     9.7     [09-13-23 @ 07:28]            Creatinine Trend:  SCr 1.28 [09-13 @ 07:28]  SCr 1.45 [09-12 @ 07:19]  SCr 1.92 [09-11 @ 07:09]  SCr 1.56 [09-10 @ 06:59]  SCr 1.32 [09-08 @ 06:57]      Urinalysis (09.09.23 @ 09:33)   Glucose Qualitative, Urine: Negative  Blood, Urine: Negative  pH Urine: 6.0  Color: Light Yellow  Urine Appearance: Clear  Bilirubin: Negative  Ketone - Urine: Negative  Specific Gravity: 1.018  Protein, Urine: 30 mg/dL  Urobilinogen: Negative  Nitrite: Negative  Leukocyte Esterase Concentration: Small  Urine Sodium 86      [09-13-23 @ 18:44]  Urine Osmolality 301      [09-13-23 @ 18:44]    PTH -- (Ca 9.7)      [09-08-23 @ 06:57]   35  TSH 3.78      [09-08-23 @ 06:57]    HCV 0.05, Nonreact      [09-08-23 @ 06:57]     [facility]  [phone]  NEPHROLOGY      INITIAL CONSULT NOTE  --------------------------------------------------------------------------------  HPI:        73-year-old female with history of HTN, CAD and unclear open heart surgery (per patient may be CABG, on  daily), DM, hypothyroid, and recent atraumatic right pelvic fracture (unclear exact fracture per patient but seems to indicate pubic rami) who presents with worsening right lower back pain which began approximately 4 to 5 days PTA ago.  Patient states pain was mild initially at onset has been constant worsening since onset and denies any trauma or specific eliciting event.  No fever.  No numbness or weakness in extremities.  No loss of control of bowels or bladder.  No urinary symptoms no diarrhea. t.  Per her history no trauma or falls.  also noticed with hyponatremia. renal consult called.       PAST HISTORY  --------------------------------------------------------------------------------  PAST MEDICAL & SURGICAL HISTORY:  Diabetes mellitus      CAD (coronary artery disease)      H/O pericardiotomy      History of gout      Hypertension      Hypothyroidism      Hyperlipidemia        FAMILY HISTORY:    PAST SOCIAL HISTORY:    ALLERGIES & MEDICATIONS  --------------------------------------------------------------------------------  Allergies    penicillin (Unknown)    Intolerances      Standing Inpatient Medications  allopurinol 300 milliGRAM(s) Oral daily  amLODIPine   Tablet 5 milliGRAM(s) Oral daily  ascorbic acid 500 milliGRAM(s) Oral daily  aspirin 325 milliGRAM(s) Oral daily  atorvastatin 40 milliGRAM(s) Oral at bedtime  dextrose 5%. 1000 milliLiter(s) IV Continuous <Continuous>  dextrose 5%. 1000 milliLiter(s) IV Continuous <Continuous>  dextrose 50% Injectable 25 Gram(s) IV Push once  dextrose 50% Injectable 12.5 Gram(s) IV Push once  dextrose 50% Injectable 25 Gram(s) IV Push once  enoxaparin Injectable 30 milliGRAM(s) SubCutaneous every 24 hours  furosemide    Tablet 40 milliGRAM(s) Oral daily  glucagon  Injectable 1 milliGRAM(s) IntraMuscular once  insulin lispro (ADMELOG) corrective regimen sliding scale   SubCutaneous three times a day before meals  insulin lispro (ADMELOG) corrective regimen sliding scale   SubCutaneous at bedtime  levothyroxine 50 MICROGram(s) Oral <User Schedule>  levothyroxine 75 MICROGram(s) Oral <User Schedule>  metoprolol tartrate 25 milliGRAM(s) Oral at bedtime  multivitamin 1 Tablet(s) Oral daily  spironolactone 50 milliGRAM(s) Oral daily    PRN Inpatient Medications  acetaminophen     Tablet .. 650 milliGRAM(s) Oral every 6 hours PRN  dextrose Oral Gel 15 Gram(s) Oral once PRN  oxycodone    5 mG/acetaminophen 325 mG 1 Tablet(s) Oral every 6 hours PRN      REVIEW OF SYSTEMS  --------------------------------------------------------------------------------  Gen: No  fevers/chills   Skin: No itching or rash   Head/Eyes/Ears/Mouth: No headache; Normal hearing;  No sinus pain/discomfort, sore throat  Respiratory: No dyspnea, cough, wheezing, hemoptysis  CV: No chest pain, orthopnea  GI: No abdominal pain, diarrhea,  + nausea,  - vomiting,  denies melena, hematochezia  : No dysuria, decrease urination or hesitancy urinating  hematuria, nocturia  MSK: + sacral/pelvic area pain   Neuro: No dizziness/lightheadedness,  also with no edema         VITALS/PHYSICAL EXAM  --------------------------------------------------------------------------------  T(C): 36.2 (09-13-23 @ 20:32), Max: 36.8 (09-13-23 @ 10:07)  HR: 75 (09-13-23 @ 20:32) (58 - 75)  BP: 122/64 (09-13-23 @ 20:32) (122/64 - 153/78)  RR: 18 (09-13-23 @ 20:32) (18 - 18)  SpO2: 94% (09-13-23 @ 20:32) (94% - 98%)  Wt(kg): --        09-12-23 @ 07:01  -  09-13-23 @ 07:00  --------------------------------------------------------  IN: 800 mL / OUT: 250 mL / NET: 550 mL    09-13-23 @ 07:01  -  09-13-23 @ 23:20  --------------------------------------------------------  IN: 480 mL / OUT: 0 mL / NET: 480 mL      Physical Exam:  	Gen: Non toxic comfortable appearing   	no jvd    	Pulm: decrease bs  no rales or ronchi or wheezing  	CV: RRR, S1S2; no rub  	Back: No CVA tenderness; no sacral edema  	Abd: +BS, soft, nontender/nondistended  	: No suprapubic tenderness  	UE: Warm, no cyanosis  no clubbing,  no edema;  	LE: Warm, no cyanosis  no clubbing, no edema  	Neuro: alert and oriented. speech coherent   	Psych: Normal affect and mood  	Skin: Warm, no decrease skin turgor   	  LABS/STUDIES  --------------------------------------------------------------------------------              12.0   10.93 >-----------<  370      [09-13-23 @ 07:30]              36.0     125  |  90  |  32  ----------------------------<  237      [09-13-23 @ 07:28]  4.9   |  17  |  1.28        Ca     9.7     [09-13-23 @ 07:28]            Creatinine Trend:  SCr 1.28 [09-13 @ 07:28]  SCr 1.45 [09-12 @ 07:19]  SCr 1.92 [09-11 @ 07:09]  SCr 1.56 [09-10 @ 06:59]  SCr 1.32 [09-08 @ 06:57]      Urinalysis (09.09.23 @ 09:33)   Glucose Qualitative, Urine: Negative  Blood, Urine: Negative  pH Urine: 6.0  Color: Light Yellow  Urine Appearance: Clear  Bilirubin: Negative  Ketone - Urine: Negative  Specific Gravity: 1.018  Protein, Urine: 30 mg/dL  Urobilinogen: Negative  Nitrite: Negative  Leukocyte Esterase Concentration: Small  Urine Sodium 86      [09-13-23 @ 18:44]  Urine Osmolality 301      [09-13-23 @ 18:44]    PTH -- (Ca 9.7)      [09-08-23 @ 06:57]   35  TSH 3.78      [09-08-23 @ 06:57]    HCV 0.05, Nonreact      [09-08-23 @ 06:57]     [facility]  [phone]  NEPHROLOGY      INITIAL CONSULT NOTE  --------------------------------------------------------------------------------  HPI:        73-year-old female with history of HTN, CAD and unclear open heart surgery (per patient may be CABG, on  daily), DM, hypothyroid, and recent atraumatic right pelvic fracture (unclear exact fracture per patient but seems to indicate pubic rami) who presents with worsening right lower back pain which began approximately 4 to 5 days PTA ago.  Patient states pain was mild initially at onset has been constant worsening since onset and denies any trauma or specific eliciting event.  No fever.  No numbness or weakness in extremities.  No loss of control of bowels or bladder.  No urinary symptoms no diarrhea. t.  Per her history no trauma or falls.  also noticed with hyponatremia. renal consult called.       PAST HISTORY  --------------------------------------------------------------------------------  PAST MEDICAL & SURGICAL HISTORY:  Diabetes mellitus      CAD (coronary artery disease)      H/O pericardiotomy      History of gout      Hypertension      Hypothyroidism      Hyperlipidemia        FAMILY HISTORY:    PAST SOCIAL HISTORY:    ALLERGIES & MEDICATIONS  --------------------------------------------------------------------------------  Allergies    penicillin (Unknown)    Intolerances      Standing Inpatient Medications  allopurinol 300 milliGRAM(s) Oral daily  amLODIPine   Tablet 5 milliGRAM(s) Oral daily  ascorbic acid 500 milliGRAM(s) Oral daily  aspirin 325 milliGRAM(s) Oral daily  atorvastatin 40 milliGRAM(s) Oral at bedtime  dextrose 5%. 1000 milliLiter(s) IV Continuous <Continuous>  dextrose 5%. 1000 milliLiter(s) IV Continuous <Continuous>  dextrose 50% Injectable 25 Gram(s) IV Push once  dextrose 50% Injectable 12.5 Gram(s) IV Push once  dextrose 50% Injectable 25 Gram(s) IV Push once  enoxaparin Injectable 30 milliGRAM(s) SubCutaneous every 24 hours  furosemide    Tablet 40 milliGRAM(s) Oral daily  glucagon  Injectable 1 milliGRAM(s) IntraMuscular once  insulin lispro (ADMELOG) corrective regimen sliding scale   SubCutaneous three times a day before meals  insulin lispro (ADMELOG) corrective regimen sliding scale   SubCutaneous at bedtime  levothyroxine 50 MICROGram(s) Oral <User Schedule>  levothyroxine 75 MICROGram(s) Oral <User Schedule>  metoprolol tartrate 25 milliGRAM(s) Oral at bedtime  multivitamin 1 Tablet(s) Oral daily  spironolactone 50 milliGRAM(s) Oral daily    PRN Inpatient Medications  acetaminophen     Tablet .. 650 milliGRAM(s) Oral every 6 hours PRN  dextrose Oral Gel 15 Gram(s) Oral once PRN  oxycodone    5 mG/acetaminophen 325 mG 1 Tablet(s) Oral every 6 hours PRN      REVIEW OF SYSTEMS  --------------------------------------------------------------------------------  Gen: No  fevers/chills   Skin: No itching or rash   Head/Eyes/Ears/Mouth: No headache; Normal hearing;  No sinus pain/discomfort, sore throat  Respiratory: No dyspnea, cough, wheezing, hemoptysis  CV: No chest pain, orthopnea  GI: No abdominal pain, diarrhea,  + nausea,  - vomiting,  denies melena, hematochezia  : No dysuria, decrease urination or hesitancy urinating  hematuria, nocturia  MSK: + sacral/pelvic area pain   Neuro: No dizziness/lightheadedness,  also with no edema         VITALS/PHYSICAL EXAM  --------------------------------------------------------------------------------  T(C): 36.2 (09-13-23 @ 20:32), Max: 36.8 (09-13-23 @ 10:07)  HR: 75 (09-13-23 @ 20:32) (58 - 75)  BP: 122/64 (09-13-23 @ 20:32) (122/64 - 153/78)  RR: 18 (09-13-23 @ 20:32) (18 - 18)  SpO2: 94% (09-13-23 @ 20:32) (94% - 98%)  Wt(kg): --        09-12-23 @ 07:01  -  09-13-23 @ 07:00  --------------------------------------------------------  IN: 800 mL / OUT: 250 mL / NET: 550 mL    09-13-23 @ 07:01  -  09-13-23 @ 23:20  --------------------------------------------------------  IN: 480 mL / OUT: 0 mL / NET: 480 mL      Physical Exam:  	Gen: Non toxic comfortable appearing   	no jvd    	Pulm: decrease bs  no rales or ronchi or wheezing  	CV: RRR, S1S2; no rub  	Back: No CVA tenderness; no sacral edema  	Abd: +BS, soft, nontender/nondistended  	: No suprapubic tenderness  	UE: Warm, no cyanosis  no clubbing,  no edema;  	LE: Warm, no cyanosis  no clubbing, no edema  	Neuro: alert and oriented. speech coherent   	Psych: Normal affect and mood  	Skin: Warm, no decrease skin turgor   	  LABS/STUDIES  --------------------------------------------------------------------------------              12.0   10.93 >-----------<  370      [09-13-23 @ 07:30]              36.0     125  |  90  |  32  ----------------------------<  237      [09-13-23 @ 07:28]  4.9   |  17  |  1.28        Ca     9.7     [09-13-23 @ 07:28]            Creatinine Trend:  SCr 1.28 [09-13 @ 07:28]  SCr 1.45 [09-12 @ 07:19]  SCr 1.92 [09-11 @ 07:09]  SCr 1.56 [09-10 @ 06:59]  SCr 1.32 [09-08 @ 06:57]      Urinalysis (09.09.23 @ 09:33)   Glucose Qualitative, Urine: Negative  Blood, Urine: Negative  pH Urine: 6.0  Color: Light Yellow  Urine Appearance: Clear  Bilirubin: Negative  Ketone - Urine: Negative  Specific Gravity: 1.018  Protein, Urine: 30 mg/dL  Urobilinogen: Negative  Nitrite: Negative  Leukocyte Esterase Concentration: Small  Urine Sodium 86      [09-13-23 @ 18:44]  Urine Osmolality 301      [09-13-23 @ 18:44]    PTH -- (Ca 9.7)      [09-08-23 @ 06:57]   35  TSH 3.78      [09-08-23 @ 06:57]    HCV 0.05, Nonreact      [09-08-23 @ 06:57]

## 2023-09-13 NOTE — PROGRESS NOTE ADULT - SUBJECTIVE AND OBJECTIVE BOX
Patient is a 73y old  Female who presents with a chief complaint of Pt is a 73-year-old female with history of HTN, CAD and unclear open heart surgery (per patient may be CABG, on  daily), DM, hypothyroid, and recent atraumatic right pelvic fracture (unclear exact fracture per patient but seems to indicate pubic rami) who presents with worsening right lower back pain     (10 Sep 2023 10:59)      SUBJECTIVE / OVERNIGHT EVENTS: No new complaints.   Review of Systems  chest pain no  palpitations no  sob no  nausea no  headache no    MEDICATIONS  (STANDING):  allopurinol 300 milliGRAM(s) Oral daily  amLODIPine   Tablet 5 milliGRAM(s) Oral daily  ascorbic acid 500 milliGRAM(s) Oral daily  aspirin 325 milliGRAM(s) Oral daily  atorvastatin 40 milliGRAM(s) Oral at bedtime  dextrose 5%. 1000 milliLiter(s) (100 mL/Hr) IV Continuous <Continuous>  dextrose 5%. 1000 milliLiter(s) (50 mL/Hr) IV Continuous <Continuous>  dextrose 50% Injectable 25 Gram(s) IV Push once  dextrose 50% Injectable 12.5 Gram(s) IV Push once  dextrose 50% Injectable 25 Gram(s) IV Push once  enoxaparin Injectable 30 milliGRAM(s) SubCutaneous every 24 hours  furosemide    Tablet 40 milliGRAM(s) Oral daily  glucagon  Injectable 1 milliGRAM(s) IntraMuscular once  insulin lispro (ADMELOG) corrective regimen sliding scale   SubCutaneous three times a day before meals  insulin lispro (ADMELOG) corrective regimen sliding scale   SubCutaneous at bedtime  levothyroxine 75 MICROGram(s) Oral <User Schedule>  levothyroxine 50 MICROGram(s) Oral <User Schedule>  metoprolol tartrate 25 milliGRAM(s) Oral at bedtime  multivitamin 1 Tablet(s) Oral daily  spironolactone 50 milliGRAM(s) Oral daily    MEDICATIONS  (PRN):  acetaminophen     Tablet .. 650 milliGRAM(s) Oral every 6 hours PRN Temp greater or equal to 38.5C (101.3F), Mild Pain (1 - 3)  dextrose Oral Gel 15 Gram(s) Oral once PRN Blood Glucose LESS THAN 70 milliGRAM(s)/deciliter  oxycodone    5 mG/acetaminophen 325 mG 1 Tablet(s) Oral every 6 hours PRN Moderate Pain (4 - 6)      Vital Signs Last 24 Hrs  T(C): 36.8 (13 Sep 2023 10:07), Max: 36.8 (13 Sep 2023 10:07)  T(F): 98.3 (13 Sep 2023 10:07), Max: 98.3 (13 Sep 2023 10:07)  HR: 58 (13 Sep 2023 10:07) (58 - 66)  BP: 146/71 (13 Sep 2023 10:07) (134/78 - 153/78)  BP(mean): --  RR: 18 (13 Sep 2023 10:07) (18 - 18)  SpO2: 98% (13 Sep 2023 10:07) (96% - 98%)    Parameters below as of 13 Sep 2023 10:07  Patient On (Oxygen Delivery Method): room air        PHYSICAL EXAM:  GENERAL: NAD   HEAD:  Atraumatic, Normocephalic  EYES: EOMI, PERRLA, conjunctiva and sclera clear  NECK: Supple, No JVD  CHEST/LUNG: Clear to auscultation bilaterally; No wheeze  HEART: Regular rate and rhythm; No murmurs, rubs, or gallops  ABDOMEN: Soft, Nontender, Nondistended; Bowel sounds present  EXTREMITIES:  2+ Peripheral Pulses, No clubbing, cyanosis, or edema  PSYCH: AAOx3  NEUROLOGY: non-focal  SKIN: No rashes or lesions    LABS:                        12.0   10.93 )-----------( 370      ( 13 Sep 2023 07:30 )             36.0     09-13    125<L>  |  90<L>  |  32<H>  ----------------------------<  237<H>  4.9   |  17<L>  |  1.28    Ca    9.7      13 Sep 2023 07:28            Urinalysis Basic - ( 13 Sep 2023 07:28 )    Color: x / Appearance: x / SG: x / pH: x  Gluc: 237 mg/dL / Ketone: x  / Bili: x / Urobili: x   Blood: x / Protein: x / Nitrite: x   Leuk Esterase: x / RBC: x / WBC x   Sq Epi: x / Non Sq Epi: x / Bacteria: x          RADIOLOGY & ADDITIONAL TESTS:    Imaging Personally Reviewed:    Consultant(s) Notes Reviewed:      Care Discussed with Consultants/Other Providers:

## 2023-09-14 LAB
ANION GAP SERPL CALC-SCNC: 14 MMOL/L — SIGNIFICANT CHANGE UP (ref 5–17)
BUN SERPL-MCNC: 28 MG/DL — HIGH (ref 7–23)
CALCIUM SERPL-MCNC: 9.6 MG/DL — SIGNIFICANT CHANGE UP (ref 8.4–10.5)
CHLORIDE SERPL-SCNC: 91 MMOL/L — LOW (ref 96–108)
CO2 SERPL-SCNC: 18 MMOL/L — LOW (ref 22–31)
CREAT SERPL-MCNC: 1.31 MG/DL — HIGH (ref 0.5–1.3)
EGFR: 42 ML/MIN/1.73M2 — LOW
GLUCOSE BLDC GLUCOMTR-MCNC: 143 MG/DL — HIGH (ref 70–99)
GLUCOSE BLDC GLUCOMTR-MCNC: 152 MG/DL — HIGH (ref 70–99)
GLUCOSE BLDC GLUCOMTR-MCNC: 257 MG/DL — HIGH (ref 70–99)
GLUCOSE BLDC GLUCOMTR-MCNC: 279 MG/DL — HIGH (ref 70–99)
GLUCOSE SERPL-MCNC: 136 MG/DL — HIGH (ref 70–99)
HCT VFR BLD CALC: 35.2 % — SIGNIFICANT CHANGE UP (ref 34.5–45)
HGB BLD-MCNC: 11.6 G/DL — SIGNIFICANT CHANGE UP (ref 11.5–15.5)
MCHC RBC-ENTMCNC: 30.8 PG — SIGNIFICANT CHANGE UP (ref 27–34)
MCHC RBC-ENTMCNC: 33 GM/DL — SIGNIFICANT CHANGE UP (ref 32–36)
MCV RBC AUTO: 93.4 FL — SIGNIFICANT CHANGE UP (ref 80–100)
NRBC # BLD: 0 /100 WBCS — SIGNIFICANT CHANGE UP (ref 0–0)
NT-PROBNP SERPL-SCNC: 401 PG/ML — HIGH (ref 0–300)
PLATELET # BLD AUTO: 368 K/UL — SIGNIFICANT CHANGE UP (ref 150–400)
POTASSIUM SERPL-MCNC: 4.7 MMOL/L — SIGNIFICANT CHANGE UP (ref 3.5–5.3)
POTASSIUM SERPL-SCNC: 4.7 MMOL/L — SIGNIFICANT CHANGE UP (ref 3.5–5.3)
RBC # BLD: 3.77 M/UL — LOW (ref 3.8–5.2)
RBC # FLD: 14.4 % — SIGNIFICANT CHANGE UP (ref 10.3–14.5)
SODIUM SERPL-SCNC: 123 MMOL/L — LOW (ref 135–145)
WBC # BLD: 11.02 K/UL — HIGH (ref 3.8–10.5)
WBC # FLD AUTO: 11.02 K/UL — HIGH (ref 3.8–10.5)

## 2023-09-14 RX ORDER — SODIUM CHLORIDE 9 MG/ML
1000 INJECTION INTRAMUSCULAR; INTRAVENOUS; SUBCUTANEOUS
Refills: 0 | Status: DISCONTINUED | OUTPATIENT
Start: 2023-09-14 | End: 2023-09-15

## 2023-09-14 RX ADMIN — Medication 25 MILLIGRAM(S): at 22:01

## 2023-09-14 RX ADMIN — SODIUM CHLORIDE 50 MILLILITER(S): 9 INJECTION INTRAMUSCULAR; INTRAVENOUS; SUBCUTANEOUS at 15:07

## 2023-09-14 RX ADMIN — AMLODIPINE BESYLATE 5 MILLIGRAM(S): 2.5 TABLET ORAL at 05:35

## 2023-09-14 RX ADMIN — ENOXAPARIN SODIUM 30 MILLIGRAM(S): 100 INJECTION SUBCUTANEOUS at 05:36

## 2023-09-14 RX ADMIN — Medication 650 MILLIGRAM(S): at 11:22

## 2023-09-14 RX ADMIN — Medication 40 MILLIGRAM(S): at 05:36

## 2023-09-14 RX ADMIN — Medication 300 MILLIGRAM(S): at 11:21

## 2023-09-14 RX ADMIN — Medication 650 MILLIGRAM(S): at 12:43

## 2023-09-14 RX ADMIN — Medication 6: at 12:18

## 2023-09-14 RX ADMIN — Medication 500 MILLIGRAM(S): at 11:21

## 2023-09-14 RX ADMIN — SPIRONOLACTONE 50 MILLIGRAM(S): 25 TABLET, FILM COATED ORAL at 05:36

## 2023-09-14 RX ADMIN — ATORVASTATIN CALCIUM 40 MILLIGRAM(S): 80 TABLET, FILM COATED ORAL at 22:00

## 2023-09-14 RX ADMIN — Medication 2: at 08:40

## 2023-09-14 RX ADMIN — Medication 325 MILLIGRAM(S): at 11:21

## 2023-09-14 RX ADMIN — Medication 1 TABLET(S): at 11:21

## 2023-09-14 RX ADMIN — Medication 2: at 22:02

## 2023-09-14 RX ADMIN — Medication 75 MICROGRAM(S): at 08:40

## 2023-09-14 NOTE — PROGRESS NOTE ADULT - ASSESSMENT
73 f with    Back pain/ Sacral fracture  - pain control  - MRI LS spine noted  - CT pelvis noted  - PT  - Neurosurgery evaluation noted. No intervention     Hypercalcemia  - follow  - PTH  - Vit D    CAD (coronary artery disease)   - stable  - Echo  - Cardiology evaluation dr Diego     Diabetes mellitus   - ADA diet  - BS control    H/O pericardiotomy   - Echo    History of gout   - continue Rx  - Uric acid    Hyperlipidemia   - stable    Hypertension   - control    Hypothyroidism   - supplement  - follow TSH    JONATHAN  - follow Cr, lytes  - Hold Lasix and Aldactone.    Hyponatremia  - Nephrology follow Dr. Varela    DVT prophylaxis     DCP in progress     Guido Rolon MD phone [phone]      73 f with    Back pain/ Sacral fracture  - pain control  - MRI LS spine noted  - CT pelvis noted  - PT  - Neurosurgery evaluation noted. No intervention     Hypercalcemia  - follow  - PTH  - Vit D    CAD (coronary artery disease)   - stable  - Echo  - Cardiology evaluation dr Diego     Diabetes mellitus   - ADA diet  - BS control    H/O pericardiotomy   - Echo    History of gout   - continue Rx  - Uric acid    Hyperlipidemia   - stable    Hypertension   - control    Hypothyroidism   - supplement  - follow TSH    JONATHAN  - follow Cr, lytes  - Hold Lasix and Aldactone.    Hyponatremia  - Nephrology follow Dr. Varela    DVT prophylaxis     DCP in progress     Guido Rolon MD phone [phone]      73 f with    Back pain/ Sacral fracture  - pain control  - MRI LS spine noted  - CT pelvis noted  - PT  - Neurosurgery evaluation noted. No intervention     Hypercalcemia  - follow  - PTH  - Vit D    CAD (coronary artery disease)   - stable  - Echo  - Cardiology evaluation dr Diego     Diabetes mellitus   - ADA diet  - BS control    H/O pericardiotomy   - Echo    History of gout   - continue Rx  - Uric acid    Hyperlipidemia   - stable    Hypertension   - control    Hypothyroidism   - supplement  - follow TSH    JONATHAN  - follow Cr, lytes  - Hold Lasix and Aldactone.    Hyponatremia  - Nephrology follow Dr. Varela    DVT prophylaxis     DCP in progress     Guido Rolon MD phone [phone]

## 2023-09-14 NOTE — PROGRESS NOTE ADULT - ASSESSMENT
73-year-old female with history of HTN, CAD and unclear open heart surgery (per patient may be CABG, on  daily), DM, hypothyroid, and recent atraumatic right pelvic fracture  who presents with worsening right lower back pain      1- hyponatremia  2- ckd III   3- acidosis   4- HTN   5- hx chf    creatinine elevated on 9/11 likely contrast induced,   creatinine improving to baseline.   worsening hyponatremia today, suspect hypolemia  pro bnp - 401   d/c aldactone, d/c lasix  1L po fluid restriction  NS @ 50 ml/hr x 12 hours  trend sodium in am  pain control  trend serum bicarb  level   cont norvasc 5 mg daily

## 2023-09-14 NOTE — PROGRESS NOTE ADULT - SUBJECTIVE AND OBJECTIVE BOX
Cardiovascular Disease Progress Note    Overnight events: No acute events overnight.  no new cardiac sx  Otherwise review of systems negative    Objective Findings:  T(C): 36.4 (09-14-23 @ 05:30), Max: 36.8 (09-13-23 @ 10:07)  HR: 62 (09-14-23 @ 05:30) (58 - 75)  BP: 145/70 (09-14-23 @ 05:30) (122/64 - 146/71)  RR: 18 (09-14-23 @ 05:30) (18 - 18)  SpO2: 94% (09-14-23 @ 05:30) (94% - 98%)  Wt(kg): --  Daily     Daily       Physical Exam:  Gen: NAD  HEENT: EOMI  CV: RRR, normal S1 + S2, no m/r/g  Lungs: CTAB  Abd: soft, non-tender  Ext: No edema    Telemetry:    Laboratory Data:                        11.6   11.02 )-----------( 368      ( 14 Sep 2023 06:51 )             35.2     09-14    123<L>  |  91<L>  |  28<H>  ----------------------------<  136<H>  4.7   |  18<L>  |  1.31<H>    Ca    9.6      14 Sep 2023 06:53                Inpatient Medications:  MEDICATIONS  (STANDING):  allopurinol 300 milliGRAM(s) Oral daily  amLODIPine   Tablet 5 milliGRAM(s) Oral daily  ascorbic acid 500 milliGRAM(s) Oral daily  aspirin 325 milliGRAM(s) Oral daily  atorvastatin 40 milliGRAM(s) Oral at bedtime  dextrose 5%. 1000 milliLiter(s) (100 mL/Hr) IV Continuous <Continuous>  dextrose 5%. 1000 milliLiter(s) (50 mL/Hr) IV Continuous <Continuous>  dextrose 50% Injectable 25 Gram(s) IV Push once  dextrose 50% Injectable 12.5 Gram(s) IV Push once  dextrose 50% Injectable 25 Gram(s) IV Push once  enoxaparin Injectable 30 milliGRAM(s) SubCutaneous every 24 hours  furosemide    Tablet 40 milliGRAM(s) Oral daily  glucagon  Injectable 1 milliGRAM(s) IntraMuscular once  insulin lispro (ADMELOG) corrective regimen sliding scale   SubCutaneous three times a day before meals  insulin lispro (ADMELOG) corrective regimen sliding scale   SubCutaneous at bedtime  levothyroxine 75 MICROGram(s) Oral <User Schedule>  levothyroxine 50 MICROGram(s) Oral <User Schedule>  metoprolol tartrate 25 milliGRAM(s) Oral at bedtime  multivitamin 1 Tablet(s) Oral daily  spironolactone 50 milliGRAM(s) Oral daily      Assessment:  73-year-old female with history of HTN, CAD, carotid artery disease, DM, hypothyroid, and recent atraumatic right pelvic fracture (unclear exact fracture per patient but seems to indicate pubic rami) who presents with worsening right lower back pain which began approximately 4 to 5 days ago.  Patient states pain was mild initially at onset has been constant worsening since onset and denies any trauma or specific eliciting event.  No fever.  No numbness or weakness in extremities.  No loss of control of bowels or bladder.  No urinary symptoms no diarrhea.  No abdominal pain.  No nausea or vomiting. Further history from patient had right pelvic fracture which is likely his pubic rami though have no imaging in EMR and unclear history per patient.  Per her history no trauma or falls.    Recs:  cardiac stable  no e/o acs or chf  cw asa and cont with statin give hx of cad/pad  cw antihypertensives   diuretics per renal  pain control  ortho/nsgy eval - conservative management  s/p mri spine, results noted        Over 25 minutes spent on total encounter; more than 50% of the visit was spent counseling and/or coordinating care by the attending physician.      Carlos Diego MD   Cardiovascular Disease  [phone] Cardiovascular Disease Progress Note    Overnight events: No acute events overnight.  no new cardiac sx  Otherwise review of systems negative    Objective Findings:  T(C): 36.4 (09-14-23 @ 05:30), Max: 36.8 (09-13-23 @ 10:07)  HR: 62 (09-14-23 @ 05:30) (58 - 75)  BP: 145/70 (09-14-23 @ 05:30) (122/64 - 146/71)  RR: 18 (09-14-23 @ 05:30) (18 - 18)  SpO2: 94% (09-14-23 @ 05:30) (94% - 98%)  Wt(kg): --  Daily     Daily       Physical Exam:  Gen: NAD  HEENT: EOMI  CV: RRR, normal S1 + S2, no m/r/g  Lungs: CTAB  Abd: soft, non-tender  Ext: No edema    Telemetry:    Laboratory Data:                        11.6   11.02 )-----------( 368      ( 14 Sep 2023 06:51 )             35.2     09-14    123<L>  |  91<L>  |  28<H>  ----------------------------<  136<H>  4.7   |  18<L>  |  1.31<H>    Ca    9.6      14 Sep 2023 06:53                Inpatient Medications:  MEDICATIONS  (STANDING):  allopurinol 300 milliGRAM(s) Oral daily  amLODIPine   Tablet 5 milliGRAM(s) Oral daily  ascorbic acid 500 milliGRAM(s) Oral daily  aspirin 325 milliGRAM(s) Oral daily  atorvastatin 40 milliGRAM(s) Oral at bedtime  dextrose 5%. 1000 milliLiter(s) (100 mL/Hr) IV Continuous <Continuous>  dextrose 5%. 1000 milliLiter(s) (50 mL/Hr) IV Continuous <Continuous>  dextrose 50% Injectable 25 Gram(s) IV Push once  dextrose 50% Injectable 12.5 Gram(s) IV Push once  dextrose 50% Injectable 25 Gram(s) IV Push once  enoxaparin Injectable 30 milliGRAM(s) SubCutaneous every 24 hours  furosemide    Tablet 40 milliGRAM(s) Oral daily  glucagon  Injectable 1 milliGRAM(s) IntraMuscular once  insulin lispro (ADMELOG) corrective regimen sliding scale   SubCutaneous three times a day before meals  insulin lispro (ADMELOG) corrective regimen sliding scale   SubCutaneous at bedtime  levothyroxine 75 MICROGram(s) Oral <User Schedule>  levothyroxine 50 MICROGram(s) Oral <User Schedule>  metoprolol tartrate 25 milliGRAM(s) Oral at bedtime  multivitamin 1 Tablet(s) Oral daily  spironolactone 50 milliGRAM(s) Oral daily      Assessment:  73-year-old female with history of HTN, CAD, carotid artery disease, DM, hypothyroid, and recent atraumatic right pelvic fracture (unclear exact fracture per patient but seems to indicate pubic rami) who presents with worsening right lower back pain which began approximately 4 to 5 days ago.  Patient states pain was mild initially at onset has been constant worsening since onset and denies any trauma or specific eliciting event.  No fever.  No numbness or weakness in extremities.  No loss of control of bowels or bladder.  No urinary symptoms no diarrhea.  No abdominal pain.  No nausea or vomiting. Further history from patient had right pelvic fracture which is likely his pubic rami though have no imaging in EMR and unclear history per patient.  Per her history no trauma or falls.    Recs:  cardiac stable  no e/o acs or chf  cw asa and cont with statin give hx of cad/pad  cw antihypertensives   diuretics per renal  pain control  ortho/nsgy eval - conservative management  s/p mri spine, results noted        Over 25 minutes spent on total encounter; more than 50% of the visit was spent counseling and/or coordinating care by the attending physician.      Carlos Diego MD   Cardiovascular Disease  [phone] Cardiovascular Disease Progress Note    Overnight events: No acute events overnight.  no new cardiac sx  Otherwise review of systems negative    Objective Findings:  T(C): 36.4 (09-14-23 @ 05:30), Max: 36.8 (09-13-23 @ 10:07)  HR: 62 (09-14-23 @ 05:30) (58 - 75)  BP: 145/70 (09-14-23 @ 05:30) (122/64 - 146/71)  RR: 18 (09-14-23 @ 05:30) (18 - 18)  SpO2: 94% (09-14-23 @ 05:30) (94% - 98%)  Wt(kg): --  Daily     Daily       Physical Exam:  Gen: NAD  HEENT: EOMI  CV: RRR, normal S1 + S2, no m/r/g  Lungs: CTAB  Abd: soft, non-tender  Ext: No edema    Telemetry:    Laboratory Data:                        11.6   11.02 )-----------( 368      ( 14 Sep 2023 06:51 )             35.2     09-14    123<L>  |  91<L>  |  28<H>  ----------------------------<  136<H>  4.7   |  18<L>  |  1.31<H>    Ca    9.6      14 Sep 2023 06:53                Inpatient Medications:  MEDICATIONS  (STANDING):  allopurinol 300 milliGRAM(s) Oral daily  amLODIPine   Tablet 5 milliGRAM(s) Oral daily  ascorbic acid 500 milliGRAM(s) Oral daily  aspirin 325 milliGRAM(s) Oral daily  atorvastatin 40 milliGRAM(s) Oral at bedtime  dextrose 5%. 1000 milliLiter(s) (100 mL/Hr) IV Continuous <Continuous>  dextrose 5%. 1000 milliLiter(s) (50 mL/Hr) IV Continuous <Continuous>  dextrose 50% Injectable 25 Gram(s) IV Push once  dextrose 50% Injectable 12.5 Gram(s) IV Push once  dextrose 50% Injectable 25 Gram(s) IV Push once  enoxaparin Injectable 30 milliGRAM(s) SubCutaneous every 24 hours  furosemide    Tablet 40 milliGRAM(s) Oral daily  glucagon  Injectable 1 milliGRAM(s) IntraMuscular once  insulin lispro (ADMELOG) corrective regimen sliding scale   SubCutaneous three times a day before meals  insulin lispro (ADMELOG) corrective regimen sliding scale   SubCutaneous at bedtime  levothyroxine 75 MICROGram(s) Oral <User Schedule>  levothyroxine 50 MICROGram(s) Oral <User Schedule>  metoprolol tartrate 25 milliGRAM(s) Oral at bedtime  multivitamin 1 Tablet(s) Oral daily  spironolactone 50 milliGRAM(s) Oral daily      Assessment:  73-year-old female with history of HTN, CAD, carotid artery disease, DM, hypothyroid, and recent atraumatic right pelvic fracture (unclear exact fracture per patient but seems to indicate pubic rami) who presents with worsening right lower back pain which began approximately 4 to 5 days ago.  Patient states pain was mild initially at onset has been constant worsening since onset and denies any trauma or specific eliciting event.  No fever.  No numbness or weakness in extremities.  No loss of control of bowels or bladder.  No urinary symptoms no diarrhea.  No abdominal pain.  No nausea or vomiting. Further history from patient had right pelvic fracture which is likely his pubic rami though have no imaging in EMR and unclear history per patient.  Per her history no trauma or falls.    Recs:  cardiac stable  no e/o acs or chf  cw asa and cont with statin give hx of cad/pad  cw antihypertensives   diuretics per renal  pain control  ortho/nsgy eval - conservative management  s/p mri spine, results noted        Over 25 minutes spent on total encounter; more than 50% of the visit was spent counseling and/or coordinating care by the attending physician.      Carlos Diego MD   Cardiovascular Disease  [phone]

## 2023-09-14 NOTE — PROGRESS NOTE ADULT - NUTRITIONAL ASSESSMENT
This patient has been assessed with a concern for Malnutrition and has been determined to have a diagnosis/diagnoses of Severe protein-calorie malnutrition.    This patient is being managed with:   Diet Regular-  Consistent Carbohydrate {Evening Snack} (CSTCHOSN)  1000mL Fluid Restriction ([id number])  Entered: Sep 12 2023 12:26PM   This patient has been assessed with a concern for Malnutrition and has been determined to have a diagnosis/diagnoses of Severe protein-calorie malnutrition.    This patient is being managed with:   Diet Regular-  Consistent Carbohydrate {Evening Snack} (CSTCHOSN)  1000mL Fluid Restriction ([id number])  Entered: Sep 12 2023 12:26PM   This patient has been assessed with a concern for Malnutrition and has been determined to have a diagnosis/diagnoses of Severe protein-calorie malnutrition.    This patient is being managed with:   Diet Regular-  Consistent Carbohydrate {Evening Snack} (CSTCHOSN)  1000mL Fluid Restriction ([id number])  Entered: Sep 12 2023 12:26PM

## 2023-09-14 NOTE — PROGRESS NOTE ADULT - SUBJECTIVE AND OBJECTIVE BOX
Patient is a 73y old  Female who presents with a chief complaint of Pt is a 73-year-old female with history of HTN, CAD and unclear open heart surgery (per patient may be CABG, on  daily), DM, hypothyroid, and recent atraumatic right pelvic fracture (unclear exact fracture per patient but seems to indicate pubic rami) who presents with worsening right lower back pain     (10 Sep 2023 10:59)      SUBJECTIVE / OVERNIGHT EVENTS: No new complaints.   Review of Systems  chest pain no  palpitations no  sob no  nausea no  headache no    MEDICATIONS  (STANDING):  allopurinol 300 milliGRAM(s) Oral daily  amLODIPine   Tablet 5 milliGRAM(s) Oral daily  ascorbic acid 500 milliGRAM(s) Oral daily  aspirin 325 milliGRAM(s) Oral daily  atorvastatin 40 milliGRAM(s) Oral at bedtime  dextrose 5%. 1000 milliLiter(s) (50 mL/Hr) IV Continuous <Continuous>  dextrose 5%. 1000 milliLiter(s) (100 mL/Hr) IV Continuous <Continuous>  dextrose 50% Injectable 25 Gram(s) IV Push once  dextrose 50% Injectable 12.5 Gram(s) IV Push once  dextrose 50% Injectable 25 Gram(s) IV Push once  enoxaparin Injectable 30 milliGRAM(s) SubCutaneous every 24 hours  glucagon  Injectable 1 milliGRAM(s) IntraMuscular once  insulin lispro (ADMELOG) corrective regimen sliding scale   SubCutaneous three times a day before meals  insulin lispro (ADMELOG) corrective regimen sliding scale   SubCutaneous at bedtime  levothyroxine 75 MICROGram(s) Oral <User Schedule>  levothyroxine 50 MICROGram(s) Oral <User Schedule>  metoprolol tartrate 25 milliGRAM(s) Oral at bedtime  multivitamin 1 Tablet(s) Oral daily  sodium chloride 0.9%. 1000 milliLiter(s) (50 mL/Hr) IV Continuous <Continuous>    MEDICATIONS  (PRN):  acetaminophen     Tablet .. 650 milliGRAM(s) Oral every 6 hours PRN Temp greater or equal to 38.5C (101.3F), Mild Pain (1 - 3)  dextrose Oral Gel 15 Gram(s) Oral once PRN Blood Glucose LESS THAN 70 milliGRAM(s)/deciliter  oxycodone    5 mG/acetaminophen 325 mG 1 Tablet(s) Oral every 6 hours PRN Moderate Pain (4 - 6)      Vital Signs Last 24 Hrs  T(C): 36.9 (14 Sep 2023 11:49), Max: 36.9 (14 Sep 2023 11:49)  T(F): 98.4 (14 Sep 2023 11:49), Max: 98.4 (14 Sep 2023 11:49)  HR: 59 (14 Sep 2023 11:49) (59 - 75)  BP: 127/65 (14 Sep 2023 11:49) (122/64 - 145/70)  BP(mean): --  RR: 18 (14 Sep 2023 11:49) (18 - 18)  SpO2: 98% (14 Sep 2023 11:49) (94% - 98%)    Parameters below as of 14 Sep 2023 11:49  Patient On (Oxygen Delivery Method): room air        PHYSICAL EXAM:  GENERAL: NAD, well-developed  HEAD:  Atraumatic, Normocephalic  EYES: EOMI, PERRLA, conjunctiva and sclera clear  NECK: Supple, No JVD  CHEST/LUNG: Clear to auscultation bilaterally; No wheeze  HEART: Regular rate and rhythm; No murmurs, rubs, or gallops  ABDOMEN: Soft, Nontender, Nondistended; Bowel sounds present  EXTREMITIES:  2+ Peripheral Pulses, No clubbing, cyanosis, or edema  PSYCH: AAOx3  NEUROLOGY: non-focal  SKIN: No rashes or lesions    LABS:                        11.6   11.02 )-----------( 368      ( 14 Sep 2023 06:51 )             35.2     09-14    123<L>  |  91<L>  |  28<H>  ----------------------------<  136<H>  4.7   |  18<L>  |  1.31<H>    Ca    9.6      14 Sep 2023 06:53            Urinalysis Basic - ( 14 Sep 2023 06:53 )    Color: x / Appearance: x / SG: x / pH: x  Gluc: 136 mg/dL / Ketone: x  / Bili: x / Urobili: x   Blood: x / Protein: x / Nitrite: x   Leuk Esterase: x / RBC: x / WBC x   Sq Epi: x / Non Sq Epi: x / Bacteria: x          RADIOLOGY & ADDITIONAL TESTS:    Imaging Personally Reviewed:    Consultant(s) Notes Reviewed:      Care Discussed with Consultants/Other Providers:

## 2023-09-14 NOTE — PROGRESS NOTE ADULT - SUBJECTIVE AND OBJECTIVE BOX
[facility]   [phone]  RENAL FOLLOW UP NOTE  --------------------------------------------------------------------------------  Chief Complaint:    24 hour events/subjective:    patient seen and examined.   c/o back pain  states she is feeling weak     PAST HISTORY  --------------------------------------------------------------------------------  No significant changes to PMH, PSH, FHx, SHx, unless otherwise noted    ALLERGIES & MEDICATIONS  --------------------------------------------------------------------------------  Allergies    penicillin (Unknown)    Intolerances      Standing Inpatient Medications  allopurinol 300 milliGRAM(s) Oral daily  amLODIPine   Tablet 5 milliGRAM(s) Oral daily  ascorbic acid 500 milliGRAM(s) Oral daily  aspirin 325 milliGRAM(s) Oral daily  atorvastatin 40 milliGRAM(s) Oral at bedtime  dextrose 5%. 1000 milliLiter(s) IV Continuous <Continuous>  dextrose 5%. 1000 milliLiter(s) IV Continuous <Continuous>  dextrose 50% Injectable 25 Gram(s) IV Push once  dextrose 50% Injectable 12.5 Gram(s) IV Push once  dextrose 50% Injectable 25 Gram(s) IV Push once  enoxaparin Injectable 30 milliGRAM(s) SubCutaneous every 24 hours  glucagon  Injectable 1 milliGRAM(s) IntraMuscular once  insulin lispro (ADMELOG) corrective regimen sliding scale   SubCutaneous three times a day before meals  insulin lispro (ADMELOG) corrective regimen sliding scale   SubCutaneous at bedtime  levothyroxine 50 MICROGram(s) Oral <User Schedule>  levothyroxine 75 MICROGram(s) Oral <User Schedule>  metoprolol tartrate 25 milliGRAM(s) Oral at bedtime  multivitamin 1 Tablet(s) Oral daily  sodium chloride 0.9%. 1000 milliLiter(s) IV Continuous <Continuous>    PRN Inpatient Medications  acetaminophen     Tablet .. 650 milliGRAM(s) Oral every 6 hours PRN  dextrose Oral Gel 15 Gram(s) Oral once PRN  oxycodone    5 mG/acetaminophen 325 mG 1 Tablet(s) Oral every 6 hours PRN      REVIEW OF SYSTEMS  --------------------------------------------------------------------------------    Gen: denies fevers/chills,  CVS: denies chest pain/palpitations  Resp: denies SOB/Cough  GI: Denies N/V/Abd pain  : Denies dysuria    VITALS/PHYSICAL EXAM  --------------------------------------------------------------------------------  T(C): 36.9 (09-14-23 @ 11:49), Max: 36.9 (09-14-23 @ 11:49)  HR: 59 (09-14-23 @ 11:49) (59 - 75)  BP: 127/65 (09-14-23 @ 11:49) (122/64 - 145/70)  RR: 18 (09-14-23 @ 11:49) (18 - 18)  SpO2: 98% (09-14-23 @ 11:49) (94% - 98%)  Wt(kg): --        09-13-23 @ 07:01  -  09-14-23 @ 07:00  --------------------------------------------------------  IN: 480 mL / OUT: 0 mL / NET: 480 mL      Physical Exam:  	  	Gen: Non toxic comfortable appearing   	Pulm: decrease bs  no rales or ronchi or wheezing  	CV: No JVD. RRR, S1S2; no rub  	Abd: +BS, soft, nontender/nondistended  	: No suprapubic tenderness  	UE: Warm, no cyanosis  no clubbing,  no edema;  	LE: Warm, no cyanosis  no clubbing, no edema    LABS/STUDIES  --------------------------------------------------------------------------------              11.6   11.02 >-----------<  368      [09-14-23 @ 06:51]              35.2     123  |  91  |  28  ----------------------------<  136      [09-14-23 @ 06:53]  4.7   |  18  |  1.31        Ca     9.6     [09-14-23 @ 06:53]            Creatinine Trend:  SCr 1.31 [09-14 @ 06:53]  SCr 1.28 [09-13 @ 07:28]  SCr 1.45 [09-12 @ 07:19]  SCr 1.92 [09-11 @ 07:09]  SCr 1.56 [09-10 @ 06:59]                Urine Sodium 86      [09-13-23 @ 18:44]  Urine Osmolality 301      [09-13-23 @ 18:44]    PTH -- (Ca 9.7)      [09-08-23 @ 06:57]   35  TSH 3.78      [09-08-23 @ 06:57]       [facility]   [phone]  RENAL FOLLOW UP NOTE  --------------------------------------------------------------------------------  Chief Complaint:    24 hour events/subjective:    patient seen and examined.   c/o back pain  states she is feeling weak     PAST HISTORY  --------------------------------------------------------------------------------  No significant changes to PMH, PSH, FHx, SHx, unless otherwise noted    ALLERGIES & MEDICATIONS  --------------------------------------------------------------------------------  Allergies    penicillin (Unknown)    Intolerances      Standing Inpatient Medications  allopurinol 300 milliGRAM(s) Oral daily  amLODIPine   Tablet 5 milliGRAM(s) Oral daily  ascorbic acid 500 milliGRAM(s) Oral daily  aspirin 325 milliGRAM(s) Oral daily  atorvastatin 40 milliGRAM(s) Oral at bedtime  dextrose 5%. 1000 milliLiter(s) IV Continuous <Continuous>  dextrose 5%. 1000 milliLiter(s) IV Continuous <Continuous>  dextrose 50% Injectable 25 Gram(s) IV Push once  dextrose 50% Injectable 12.5 Gram(s) IV Push once  dextrose 50% Injectable 25 Gram(s) IV Push once  enoxaparin Injectable 30 milliGRAM(s) SubCutaneous every 24 hours  glucagon  Injectable 1 milliGRAM(s) IntraMuscular once  insulin lispro (ADMELOG) corrective regimen sliding scale   SubCutaneous three times a day before meals  insulin lispro (ADMELOG) corrective regimen sliding scale   SubCutaneous at bedtime  levothyroxine 50 MICROGram(s) Oral <User Schedule>  levothyroxine 75 MICROGram(s) Oral <User Schedule>  metoprolol tartrate 25 milliGRAM(s) Oral at bedtime  multivitamin 1 Tablet(s) Oral daily  sodium chloride 0.9%. 1000 milliLiter(s) IV Continuous <Continuous>    PRN Inpatient Medications  acetaminophen     Tablet .. 650 milliGRAM(s) Oral every 6 hours PRN  dextrose Oral Gel 15 Gram(s) Oral once PRN  oxycodone    5 mG/acetaminophen 325 mG 1 Tablet(s) Oral every 6 hours PRN      REVIEW OF SYSTEMS  --------------------------------------------------------------------------------    Gen: denies fevers/chills,  CVS: denies chest pain/palpitations  Resp: denies SOB/Cough  GI: Denies N/V/Abd pain  : Denies dysuria    VITALS/PHYSICAL EXAM  --------------------------------------------------------------------------------  T(C): 36.9 (09-14-23 @ 11:49), Max: 36.9 (09-14-23 @ 11:49)  HR: 59 (09-14-23 @ 11:49) (59 - 75)  BP: 127/65 (09-14-23 @ 11:49) (122/64 - 145/70)  RR: 18 (09-14-23 @ 11:49) (18 - 18)  SpO2: 98% (09-14-23 @ 11:49) (94% - 98%)  Wt(kg): --        09-13-23 @ 07:01  -  09-14-23 @ 07:00  --------------------------------------------------------  IN: 480 mL / OUT: 0 mL / NET: 480 mL      Physical Exam:  	  	Gen: Non toxic comfortable appearing   	Pulm: decrease bs  no rales or ronchi or wheezing  	CV: No JVD. RRR, S1S2; no rub  	Abd: +BS, soft, nontender/nondistended  	: No suprapubic tenderness  	UE: Warm, no cyanosis  no clubbing,  no edema;  	LE: Warm, no cyanosis  no clubbing, no edema    LABS/STUDIES  --------------------------------------------------------------------------------              11.6   11.02 >-----------<  368      [09-14-23 @ 06:51]              35.2     123  |  91  |  28  ----------------------------<  136      [09-14-23 @ 06:53]  4.7   |  18  |  1.31        Ca     9.6     [09-14-23 @ 06:53]            Creatinine Trend:  SCr 1.31 [09-14 @ 06:53]  SCr 1.28 [09-13 @ 07:28]  SCr 1.45 [09-12 @ 07:19]  SCr 1.92 [09-11 @ 07:09]  SCr 1.56 [09-10 @ 06:59]                Urine Sodium 86      [09-13-23 @ 18:44]  Urine Osmolality 301      [09-13-23 @ 18:44]    PTH -- (Ca 9.7)      [09-08-23 @ 06:57]   35  TSH 3.78      [09-08-23 @ 06:57]       [facility]   [phone]  RENAL FOLLOW UP NOTE  --------------------------------------------------------------------------------  Chief Complaint:    24 hour events/subjective:    patient seen and examined.   c/o back pain  states she is feeling weak     PAST HISTORY  --------------------------------------------------------------------------------  No significant changes to PMH, PSH, FHx, SHx, unless otherwise noted    ALLERGIES & MEDICATIONS  --------------------------------------------------------------------------------  Allergies    penicillin (Unknown)    Intolerances      Standing Inpatient Medications  allopurinol 300 milliGRAM(s) Oral daily  amLODIPine   Tablet 5 milliGRAM(s) Oral daily  ascorbic acid 500 milliGRAM(s) Oral daily  aspirin 325 milliGRAM(s) Oral daily  atorvastatin 40 milliGRAM(s) Oral at bedtime  dextrose 5%. 1000 milliLiter(s) IV Continuous <Continuous>  dextrose 5%. 1000 milliLiter(s) IV Continuous <Continuous>  dextrose 50% Injectable 25 Gram(s) IV Push once  dextrose 50% Injectable 12.5 Gram(s) IV Push once  dextrose 50% Injectable 25 Gram(s) IV Push once  enoxaparin Injectable 30 milliGRAM(s) SubCutaneous every 24 hours  glucagon  Injectable 1 milliGRAM(s) IntraMuscular once  insulin lispro (ADMELOG) corrective regimen sliding scale   SubCutaneous three times a day before meals  insulin lispro (ADMELOG) corrective regimen sliding scale   SubCutaneous at bedtime  levothyroxine 50 MICROGram(s) Oral <User Schedule>  levothyroxine 75 MICROGram(s) Oral <User Schedule>  metoprolol tartrate 25 milliGRAM(s) Oral at bedtime  multivitamin 1 Tablet(s) Oral daily  sodium chloride 0.9%. 1000 milliLiter(s) IV Continuous <Continuous>    PRN Inpatient Medications  acetaminophen     Tablet .. 650 milliGRAM(s) Oral every 6 hours PRN  dextrose Oral Gel 15 Gram(s) Oral once PRN  oxycodone    5 mG/acetaminophen 325 mG 1 Tablet(s) Oral every 6 hours PRN      REVIEW OF SYSTEMS  --------------------------------------------------------------------------------    Gen: denies fevers/chills,  CVS: denies chest pain/palpitations  Resp: denies SOB/Cough  GI: Denies N/V/Abd pain  : Denies dysuria    VITALS/PHYSICAL EXAM  --------------------------------------------------------------------------------  T(C): 36.9 (09-14-23 @ 11:49), Max: 36.9 (09-14-23 @ 11:49)  HR: 59 (09-14-23 @ 11:49) (59 - 75)  BP: 127/65 (09-14-23 @ 11:49) (122/64 - 145/70)  RR: 18 (09-14-23 @ 11:49) (18 - 18)  SpO2: 98% (09-14-23 @ 11:49) (94% - 98%)  Wt(kg): --        09-13-23 @ 07:01  -  09-14-23 @ 07:00  --------------------------------------------------------  IN: 480 mL / OUT: 0 mL / NET: 480 mL      Physical Exam:  	  	Gen: Non toxic comfortable appearing   	Pulm: decrease bs  no rales or ronchi or wheezing  	CV: No JVD. RRR, S1S2; no rub  	Abd: +BS, soft, nontender/nondistended  	: No suprapubic tenderness  	UE: Warm, no cyanosis  no clubbing,  no edema;  	LE: Warm, no cyanosis  no clubbing, no edema    LABS/STUDIES  --------------------------------------------------------------------------------              11.6   11.02 >-----------<  368      [09-14-23 @ 06:51]              35.2     123  |  91  |  28  ----------------------------<  136      [09-14-23 @ 06:53]  4.7   |  18  |  1.31        Ca     9.6     [09-14-23 @ 06:53]            Creatinine Trend:  SCr 1.31 [09-14 @ 06:53]  SCr 1.28 [09-13 @ 07:28]  SCr 1.45 [09-12 @ 07:19]  SCr 1.92 [09-11 @ 07:09]  SCr 1.56 [09-10 @ 06:59]                Urine Sodium 86      [09-13-23 @ 18:44]  Urine Osmolality 301      [09-13-23 @ 18:44]    PTH -- (Ca 9.7)      [09-08-23 @ 06:57]   35  TSH 3.78      [09-08-23 @ 06:57]

## 2023-09-15 LAB
ANION GAP SERPL CALC-SCNC: 14 MMOL/L — SIGNIFICANT CHANGE UP (ref 5–17)
ANION GAP SERPL CALC-SCNC: 16 MMOL/L — SIGNIFICANT CHANGE UP (ref 5–17)
B-OH-BUTYR SERPL-SCNC: 0.2 MMOL/L — SIGNIFICANT CHANGE UP
BASE EXCESS BLDV CALC-SCNC: -3.2 MMOL/L — LOW (ref -2–3)
BUN SERPL-MCNC: 27 MG/DL — HIGH (ref 7–23)
BUN SERPL-MCNC: 31 MG/DL — HIGH (ref 7–23)
CA-I SERPL-SCNC: 1.23 MMOL/L — SIGNIFICANT CHANGE UP (ref 1.15–1.33)
CALCIUM SERPL-MCNC: 9.1 MG/DL — SIGNIFICANT CHANGE UP (ref 8.4–10.5)
CALCIUM SERPL-MCNC: 9.5 MG/DL — SIGNIFICANT CHANGE UP (ref 8.4–10.5)
CHLORIDE BLDV-SCNC: 92 MMOL/L — LOW (ref 96–108)
CHLORIDE SERPL-SCNC: 90 MMOL/L — LOW (ref 96–108)
CHLORIDE SERPL-SCNC: 94 MMOL/L — LOW (ref 96–108)
CO2 BLDV-SCNC: 23 MMOL/L — SIGNIFICANT CHANGE UP (ref 22–26)
CO2 SERPL-SCNC: 16 MMOL/L — LOW (ref 22–31)
CO2 SERPL-SCNC: 17 MMOL/L — LOW (ref 22–31)
CREAT SERPL-MCNC: 1.07 MG/DL — SIGNIFICANT CHANGE UP (ref 0.5–1.3)
CREAT SERPL-MCNC: 1.23 MG/DL — SIGNIFICANT CHANGE UP (ref 0.5–1.3)
EGFR: 46 ML/MIN/1.73M2 — LOW
EGFR: 54 ML/MIN/1.73M2 — LOW
GAS PNL BLDV: 121 MMOL/L — LOW (ref 136–145)
GAS PNL BLDV: SIGNIFICANT CHANGE UP
GLUCOSE BLDC GLUCOMTR-MCNC: 149 MG/DL — HIGH (ref 70–99)
GLUCOSE BLDC GLUCOMTR-MCNC: 172 MG/DL — HIGH (ref 70–99)
GLUCOSE BLDC GLUCOMTR-MCNC: 188 MG/DL — HIGH (ref 70–99)
GLUCOSE BLDC GLUCOMTR-MCNC: 189 MG/DL — HIGH (ref 70–99)
GLUCOSE BLDV-MCNC: 215 MG/DL — HIGH (ref 70–99)
GLUCOSE SERPL-MCNC: 136 MG/DL — HIGH (ref 70–99)
GLUCOSE SERPL-MCNC: 183 MG/DL — HIGH (ref 70–99)
HCO3 BLDV-SCNC: 22 MMOL/L — SIGNIFICANT CHANGE UP (ref 22–29)
HCT VFR BLD CALC: 34.6 % — SIGNIFICANT CHANGE UP (ref 34.5–45)
HCT VFR BLDA CALC: 38 % — SIGNIFICANT CHANGE UP (ref 34.5–46.5)
HGB BLD CALC-MCNC: 12.8 G/DL — SIGNIFICANT CHANGE UP (ref 11.7–16.1)
HGB BLD-MCNC: 11.5 G/DL — SIGNIFICANT CHANGE UP (ref 11.5–15.5)
LACTATE BLDV-MCNC: 1.5 MMOL/L — SIGNIFICANT CHANGE UP (ref 0.5–2)
MCHC RBC-ENTMCNC: 31 PG — SIGNIFICANT CHANGE UP (ref 27–34)
MCHC RBC-ENTMCNC: 33.2 GM/DL — SIGNIFICANT CHANGE UP (ref 32–36)
MCV RBC AUTO: 93.3 FL — SIGNIFICANT CHANGE UP (ref 80–100)
NRBC # BLD: 0 /100 WBCS — SIGNIFICANT CHANGE UP (ref 0–0)
PCO2 BLDV: 39 MMHG — SIGNIFICANT CHANGE UP (ref 39–42)
PH BLDV: 7.36 — SIGNIFICANT CHANGE UP (ref 7.32–7.43)
PLATELET # BLD AUTO: 327 K/UL — SIGNIFICANT CHANGE UP (ref 150–400)
PO2 BLDV: 27 MMHG — SIGNIFICANT CHANGE UP (ref 25–45)
POTASSIUM BLDV-SCNC: 6 MMOL/L — HIGH (ref 3.5–5.1)
POTASSIUM SERPL-MCNC: 4.4 MMOL/L — SIGNIFICANT CHANGE UP (ref 3.5–5.3)
POTASSIUM SERPL-MCNC: 4.8 MMOL/L — SIGNIFICANT CHANGE UP (ref 3.5–5.3)
POTASSIUM SERPL-SCNC: 4.4 MMOL/L — SIGNIFICANT CHANGE UP (ref 3.5–5.3)
POTASSIUM SERPL-SCNC: 4.8 MMOL/L — SIGNIFICANT CHANGE UP (ref 3.5–5.3)
RBC # BLD: 3.71 M/UL — LOW (ref 3.8–5.2)
RBC # FLD: 14.1 % — SIGNIFICANT CHANGE UP (ref 10.3–14.5)
SAO2 % BLDV: 50.7 % — LOW (ref 67–88)
SARS-COV-2 RNA SPEC QL NAA+PROBE: SIGNIFICANT CHANGE UP
SODIUM SERPL-SCNC: 122 MMOL/L — LOW (ref 135–145)
SODIUM SERPL-SCNC: 125 MMOL/L — LOW (ref 135–145)
WBC # BLD: 12.89 K/UL — HIGH (ref 3.8–10.5)
WBC # FLD AUTO: 12.89 K/UL — HIGH (ref 3.8–10.5)

## 2023-09-15 RX ORDER — SODIUM BICARBONATE 1 MEQ/ML
650 SYRINGE (ML) INTRAVENOUS THREE TIMES A DAY
Refills: 0 | Status: DISCONTINUED | OUTPATIENT
Start: 2023-09-15 | End: 2023-09-19

## 2023-09-15 RX ADMIN — Medication 500 MILLIGRAM(S): at 11:45

## 2023-09-15 RX ADMIN — Medication 300 MILLIGRAM(S): at 11:45

## 2023-09-15 RX ADMIN — Medication 325 MILLIGRAM(S): at 11:45

## 2023-09-15 RX ADMIN — ENOXAPARIN SODIUM 30 MILLIGRAM(S): 100 INJECTION SUBCUTANEOUS at 05:58

## 2023-09-15 RX ADMIN — Medication 75 MICROGRAM(S): at 08:31

## 2023-09-15 RX ADMIN — Medication 650 MILLIGRAM(S): at 11:44

## 2023-09-15 RX ADMIN — AMLODIPINE BESYLATE 5 MILLIGRAM(S): 2.5 TABLET ORAL at 06:00

## 2023-09-15 RX ADMIN — Medication 1 TABLET(S): at 11:45

## 2023-09-15 RX ADMIN — ATORVASTATIN CALCIUM 40 MILLIGRAM(S): 80 TABLET, FILM COATED ORAL at 22:35

## 2023-09-15 RX ADMIN — Medication 2: at 13:02

## 2023-09-15 RX ADMIN — Medication 650 MILLIGRAM(S): at 22:35

## 2023-09-15 RX ADMIN — Medication 25 MILLIGRAM(S): at 22:35

## 2023-09-15 RX ADMIN — Medication 2: at 08:31

## 2023-09-15 NOTE — PROGRESS NOTE ADULT - SUBJECTIVE AND OBJECTIVE BOX
[facility]   [phone]  RENAL FOLLOW UP NOTE  --------------------------------------------------------------------------------  Chief Complaint:    24 hour events/subjective:    patient seen and examined.   denies nausea  +pain    PAST HISTORY  --------------------------------------------------------------------------------  No significant changes to PMH, PSH, FHx, SHx, unless otherwise noted    ALLERGIES & MEDICATIONS  --------------------------------------------------------------------------------  Allergies    penicillin (Unknown)    Intolerances      Standing Inpatient Medications  allopurinol 300 milliGRAM(s) Oral daily  amLODIPine   Tablet 5 milliGRAM(s) Oral daily  ascorbic acid 500 milliGRAM(s) Oral daily  aspirin 325 milliGRAM(s) Oral daily  atorvastatin 40 milliGRAM(s) Oral at bedtime  dextrose 5%. 1000 milliLiter(s) IV Continuous <Continuous>  dextrose 5%. 1000 milliLiter(s) IV Continuous <Continuous>  dextrose 50% Injectable 25 Gram(s) IV Push once  dextrose 50% Injectable 12.5 Gram(s) IV Push once  dextrose 50% Injectable 25 Gram(s) IV Push once  enoxaparin Injectable 30 milliGRAM(s) SubCutaneous every 24 hours  glucagon  Injectable 1 milliGRAM(s) IntraMuscular once  insulin lispro (ADMELOG) corrective regimen sliding scale   SubCutaneous three times a day before meals  insulin lispro (ADMELOG) corrective regimen sliding scale   SubCutaneous at bedtime  levothyroxine 75 MICROGram(s) Oral <User Schedule>  levothyroxine 50 MICROGram(s) Oral <User Schedule>  metoprolol tartrate 25 milliGRAM(s) Oral at bedtime  multivitamin 1 Tablet(s) Oral daily  sodium bicarbonate 650 milliGRAM(s) Oral three times a day    PRN Inpatient Medications  acetaminophen     Tablet .. 650 milliGRAM(s) Oral every 6 hours PRN  dextrose Oral Gel 15 Gram(s) Oral once PRN      REVIEW OF SYSTEMS  --------------------------------------------------------------------------------    Gen: denies fevers/chills,  CVS: denies chest pain/palpitations  Resp: denies SOB/Cough  GI: Denies N/V/Abd pain  : Denies dysuria/oliguria/hematuria    VITALS/PHYSICAL EXAM  --------------------------------------------------------------------------------  T(C): 36.4 (09-15-23 @ 05:57), Max: 36.9 (09-14-23 @ 11:49)  HR: 55 (09-15-23 @ 05:57) (55 - 67)  BP: 127/67 (09-15-23 @ 05:57) (127/63 - 128/70)  RR: 18 (09-15-23 @ 05:57) (18 - 18)  SpO2: 96% (09-15-23 @ 05:57) (96% - 98%)  Wt(kg): --        09-14-23 @ 07:01  -  09-15-23 @ 07:00  --------------------------------------------------------  IN: 240 mL / OUT: 0 mL / NET: 240 mL      Physical Exam:  	  	Gen: Non toxic comfortable appearing   	Pulm: decrease bs  no rales or ronchi or wheezing  	CV: No JVD. RRR, S1S2; no rub  	Abd: +BS, soft, nontender/nondistended  	: No suprapubic tenderness  	UE: Warm, no cyanosis  no clubbing,  no edema;  	LE: Warm, no cyanosis  no clubbing, no edema    LABS/STUDIES  --------------------------------------------------------------------------------              11.5   12.89 >-----------<  327      [09-15-23 @ 06:53]              34.6     125  |  94  |  31  ----------------------------<  136      [09-15-23 @ 06:52]  4.4   |  17  |  1.23        Ca     9.1     [09-15-23 @ 06:52]            Creatinine Trend:  SCr 1.23 [09-15 @ 06:52]  SCr 1.31 [09-14 @ 06:53]  SCr 1.28 [09-13 @ 07:28]  SCr 1.45 [09-12 @ 07:19]  SCr 1.92 [09-11 @ 07:09]              Urine Sodium 86      [09-13-23 @ 18:44]  Urine Osmolality 301      [09-13-23 @ 18:44]    PTH -- (Ca 9.7)      [09-08-23 @ 06:57]   35  TSH 3.78      [09-08-23 @ 06:57]       [facility]   [phone]  RENAL FOLLOW UP NOTE  --------------------------------------------------------------------------------  Chief Complaint:    24 hour events/subjective:    patient seen and examined.   denies nausea  +pain    PAST HISTORY  --------------------------------------------------------------------------------  No significant changes to PMH, PSH, FHx, SHx, unless otherwise noted    ALLERGIES & MEDICATIONS  --------------------------------------------------------------------------------  Allergies    penicillin (Unknown)    Intolerances      Standing Inpatient Medications  allopurinol 300 milliGRAM(s) Oral daily  amLODIPine   Tablet 5 milliGRAM(s) Oral daily  ascorbic acid 500 milliGRAM(s) Oral daily  aspirin 325 milliGRAM(s) Oral daily  atorvastatin 40 milliGRAM(s) Oral at bedtime  dextrose 5%. 1000 milliLiter(s) IV Continuous <Continuous>  dextrose 5%. 1000 milliLiter(s) IV Continuous <Continuous>  dextrose 50% Injectable 25 Gram(s) IV Push once  dextrose 50% Injectable 12.5 Gram(s) IV Push once  dextrose 50% Injectable 25 Gram(s) IV Push once  enoxaparin Injectable 30 milliGRAM(s) SubCutaneous every 24 hours  glucagon  Injectable 1 milliGRAM(s) IntraMuscular once  insulin lispro (ADMELOG) corrective regimen sliding scale   SubCutaneous three times a day before meals  insulin lispro (ADMELOG) corrective regimen sliding scale   SubCutaneous at bedtime  levothyroxine 75 MICROGram(s) Oral <User Schedule>  levothyroxine 50 MICROGram(s) Oral <User Schedule>  metoprolol tartrate 25 milliGRAM(s) Oral at bedtime  multivitamin 1 Tablet(s) Oral daily  sodium bicarbonate 650 milliGRAM(s) Oral three times a day    PRN Inpatient Medications  acetaminophen     Tablet .. 650 milliGRAM(s) Oral every 6 hours PRN  dextrose Oral Gel 15 Gram(s) Oral once PRN      REVIEW OF SYSTEMS  --------------------------------------------------------------------------------    Gen: denies fevers/chills,  CVS: denies chest pain/palpitations  Resp: denies SOB/Cough  GI: Denies N/V/Abd pain  : Denies dysuria/oliguria/hematuria    VITALS/PHYSICAL EXAM  --------------------------------------------------------------------------------  T(C): 36.4 (09-15-23 @ 05:57), Max: 36.9 (09-14-23 @ 11:49)  HR: 55 (09-15-23 @ 05:57) (55 - 67)  BP: 127/67 (09-15-23 @ 05:57) (127/63 - 128/70)  RR: 18 (09-15-23 @ 05:57) (18 - 18)  SpO2: 96% (09-15-23 @ 05:57) (96% - 98%)  Wt(kg): --        09-14-23 @ 07:01  -  09-15-23 @ 07:00  --------------------------------------------------------  IN: 240 mL / OUT: 0 mL / NET: 240 mL      Physical Exam:  	  	Gen: Non toxic comfortable appearing   	Pulm: decrease bs  no rales or ronchi or wheezing  	CV: No JVD. RRR, S1S2; no rub  	Abd: +BS, soft, nontender/nondistended  	: No suprapubic tenderness  	UE: Warm, no cyanosis  no clubbing,  no edema;  	LE: Warm, no cyanosis  no clubbing, no edema    LABS/STUDIES  --------------------------------------------------------------------------------              11.5   12.89 >-----------<  327      [09-15-23 @ 06:53]              34.6     125  |  94  |  31  ----------------------------<  136      [09-15-23 @ 06:52]  4.4   |  17  |  1.23        Ca     9.1     [09-15-23 @ 06:52]            Creatinine Trend:  SCr 1.23 [09-15 @ 06:52]  SCr 1.31 [09-14 @ 06:53]  SCr 1.28 [09-13 @ 07:28]  SCr 1.45 [09-12 @ 07:19]  SCr 1.92 [09-11 @ 07:09]              Urine Sodium 86      [09-13-23 @ 18:44]  Urine Osmolality 301      [09-13-23 @ 18:44]    PTH -- (Ca 9.7)      [09-08-23 @ 06:57]   35  TSH 3.78      [09-08-23 @ 06:57]       [facility]   [phone]  RENAL FOLLOW UP NOTE  --------------------------------------------------------------------------------  Chief Complaint:    24 hour events/subjective:    patient seen and examined.   denies nausea  +pain    PAST HISTORY  --------------------------------------------------------------------------------  No significant changes to PMH, PSH, FHx, SHx, unless otherwise noted    ALLERGIES & MEDICATIONS  --------------------------------------------------------------------------------  Allergies    penicillin (Unknown)    Intolerances      Standing Inpatient Medications  allopurinol 300 milliGRAM(s) Oral daily  amLODIPine   Tablet 5 milliGRAM(s) Oral daily  ascorbic acid 500 milliGRAM(s) Oral daily  aspirin 325 milliGRAM(s) Oral daily  atorvastatin 40 milliGRAM(s) Oral at bedtime  dextrose 5%. 1000 milliLiter(s) IV Continuous <Continuous>  dextrose 5%. 1000 milliLiter(s) IV Continuous <Continuous>  dextrose 50% Injectable 25 Gram(s) IV Push once  dextrose 50% Injectable 12.5 Gram(s) IV Push once  dextrose 50% Injectable 25 Gram(s) IV Push once  enoxaparin Injectable 30 milliGRAM(s) SubCutaneous every 24 hours  glucagon  Injectable 1 milliGRAM(s) IntraMuscular once  insulin lispro (ADMELOG) corrective regimen sliding scale   SubCutaneous three times a day before meals  insulin lispro (ADMELOG) corrective regimen sliding scale   SubCutaneous at bedtime  levothyroxine 75 MICROGram(s) Oral <User Schedule>  levothyroxine 50 MICROGram(s) Oral <User Schedule>  metoprolol tartrate 25 milliGRAM(s) Oral at bedtime  multivitamin 1 Tablet(s) Oral daily  sodium bicarbonate 650 milliGRAM(s) Oral three times a day    PRN Inpatient Medications  acetaminophen     Tablet .. 650 milliGRAM(s) Oral every 6 hours PRN  dextrose Oral Gel 15 Gram(s) Oral once PRN      REVIEW OF SYSTEMS  --------------------------------------------------------------------------------    Gen: denies fevers/chills,  CVS: denies chest pain/palpitations  Resp: denies SOB/Cough  GI: Denies N/V/Abd pain  : Denies dysuria/oliguria/hematuria    VITALS/PHYSICAL EXAM  --------------------------------------------------------------------------------  T(C): 36.4 (09-15-23 @ 05:57), Max: 36.9 (09-14-23 @ 11:49)  HR: 55 (09-15-23 @ 05:57) (55 - 67)  BP: 127/67 (09-15-23 @ 05:57) (127/63 - 128/70)  RR: 18 (09-15-23 @ 05:57) (18 - 18)  SpO2: 96% (09-15-23 @ 05:57) (96% - 98%)  Wt(kg): --        09-14-23 @ 07:01  -  09-15-23 @ 07:00  --------------------------------------------------------  IN: 240 mL / OUT: 0 mL / NET: 240 mL      Physical Exam:  	  	Gen: Non toxic comfortable appearing   	Pulm: decrease bs  no rales or ronchi or wheezing  	CV: No JVD. RRR, S1S2; no rub  	Abd: +BS, soft, nontender/nondistended  	: No suprapubic tenderness  	UE: Warm, no cyanosis  no clubbing,  no edema;  	LE: Warm, no cyanosis  no clubbing, no edema    LABS/STUDIES  --------------------------------------------------------------------------------              11.5   12.89 >-----------<  327      [09-15-23 @ 06:53]              34.6     125  |  94  |  31  ----------------------------<  136      [09-15-23 @ 06:52]  4.4   |  17  |  1.23        Ca     9.1     [09-15-23 @ 06:52]            Creatinine Trend:  SCr 1.23 [09-15 @ 06:52]  SCr 1.31 [09-14 @ 06:53]  SCr 1.28 [09-13 @ 07:28]  SCr 1.45 [09-12 @ 07:19]  SCr 1.92 [09-11 @ 07:09]              Urine Sodium 86      [09-13-23 @ 18:44]  Urine Osmolality 301      [09-13-23 @ 18:44]    PTH -- (Ca 9.7)      [09-08-23 @ 06:57]   35  TSH 3.78      [09-08-23 @ 06:57]

## 2023-09-15 NOTE — PROGRESS NOTE ADULT - NS ATTEND AMEND GEN_ALL_CORE FT
Seen, examined with, formulated plan with and  agree with above as scribed by NP Dany [José Antonio]     seen   c/o decrease nausea but present  + pain pelvic area     heart RRR  lungs decrease bs no rales   abd soft non tender + bs  ext no edema    hyponatremia   htn   chf hx   ckd III  acidosis     etiology of low bicarb unclear   sodium bicarbonate 650 mg tid x 1 day   trend na   cont norvasc 5 mg daily   d/w Dr. Rolon

## 2023-09-15 NOTE — PROGRESS NOTE ADULT - NUTRITIONAL ASSESSMENT
This patient has been assessed with a concern for Malnutrition and has been determined to have a diagnosis/diagnoses of Severe protein-calorie malnutrition.    This patient is being managed with:   Diet Regular-  Consistent Carbohydrate {Evening Snack} (CSTCHOSN)  1000mL Fluid Restriction ([id number])  Entered: Sep 12 2023 12:26PM   This patient has been assessed with a concern for Malnutrition and has been determined to have a diagnosis/diagnoses of Severe protein-calorie malnutrition.    This patient is being managed with:   Diet Regular-  Consistent Carbohydrate {Evening Snack} (CSTCHOSN)  1000mL Fluid Restriction ([id number])  Entered: Sep 12 2023 12:26PM   This patient has been assessed with a concern for Malnutrition and has been determined to have a diagnosis/diagnoses of Severe protein-calorie malnutrition.    This patient is being managed with:   Diet Regular-  Consistent Carbohydrate {Evening Snack} (CSTCHOSN)  1000mL Fluid Restriction ([id number])  Entered: Sep 12 2023 12:26PM

## 2023-09-15 NOTE — PROGRESS NOTE ADULT - ASSESSMENT
73 f with    Back pain/ Sacral fracture  - pain control  - MRI LS spine noted  - CT pelvis noted  - PT  - Neurosurgery evaluation noted. No intervention     Hypercalcemia  - follow  - PTH  - Vit D    CAD (coronary artery disease)   - stable  - Echo  - Cardiology evaluation dr Diego     Diabetes mellitus   - ADA diet  - BS control    H/O pericardiotomy   - Echo    History of gout   - continue Rx  - Uric acid    Hyperlipidemia   - stable    Hypertension   - control    Hypothyroidism   - supplement  - follow TSH    JONATHAN  - follow Cr, lytes  - Hold Lasix and Aldactone.    Hyponatremia  - Nephrology follow Dr. Varela    DVT prophylaxis     DCP in progress when Nephrology cleared.     Guido Rolon MD phone [phone]      73 f with    Back pain/ Sacral fracture  - pain control  - MRI LS spine noted  - CT pelvis noted  - PT  - Neurosurgery evaluation noted. No intervention     Hypercalcemia  - follow  - PTH  - Vit D    CAD (coronary artery disease)   - stable  - Echo  - Cardiology evaluation dr Diego     Diabetes mellitus   - ADA diet  - BS control    H/O pericardiotomy   - Echo    History of gout   - continue Rx  - Uric acid    Hyperlipidemia   - stable    Hypertension   - control    Hypothyroidism   - supplement  - follow TSH    JONATHAN  - follow Cr, lytes  - Hold Lasix and Aldactone.    Hyponatremia  - Nephrology follow Dr. Varela    DVT prophylaxis     DCP in progress when Nephrology cleared.     Guido Rolon MD phone [phone]      73 f with    Back pain/ Sacral fracture  - pain control  - MRI LS spine noted  - CT pelvis noted  - PT  - Neurosurgery evaluation noted. No intervention     Hypercalcemia  - follow  - PTH  - Vit D    CAD (coronary artery disease)   - stable  - Echo  - Cardiology evaluation dr Diego     Diabetes mellitus   - ADA diet  - BS control    H/O pericardiotomy   - Echo    History of gout   - continue Rx  - Uric acid    Hyperlipidemia   - stable    Hypertension   - control    Hypothyroidism   - supplement  - follow TSH    JONATHAN  - follow Cr, lytes  - Hold Lasix and Aldactone.    Hyponatremia  - Nephrology follow Dr. Varela    DVT prophylaxis     DCP in progress when Nephrology cleared.     Guido Rolon MD phone [phone]

## 2023-09-15 NOTE — PROGRESS NOTE ADULT - SUBJECTIVE AND OBJECTIVE BOX
Cardiovascular Disease Progress Note    Overnight events: No acute events overnight.  no new cardiac sx  Otherwise review of systems negative    Objective Findings:  T(C): 36.4 (09-15-23 @ 05:57), Max: 36.9 (09-14-23 @ 11:49)  HR: 55 (09-15-23 @ 05:57) (55 - 67)  BP: 127/67 (09-15-23 @ 05:57) (127/63 - 128/70)  RR: 18 (09-15-23 @ 05:57) (18 - 18)  SpO2: 96% (09-15-23 @ 05:57) (96% - 98%)  Wt(kg): --  Daily     Daily       Physical Exam:  Gen: NAD  HEENT: EOMI  CV: RRR, normal S1 + S2, no m/r/g  Lungs: CTAB  Abd: soft, non-tender  Ext: No edema    Telemetry:    Laboratory Data:                        11.5   12.89 )-----------( 327      ( 15 Sep 2023 06:53 )             34.6     09-15    125<L>  |  94<L>  |  31<H>  ----------------------------<  136<H>  4.4   |  17<L>  |  1.23    Ca    9.1      15 Sep 2023 06:52                Inpatient Medications:  MEDICATIONS  (STANDING):  allopurinol 300 milliGRAM(s) Oral daily  amLODIPine   Tablet 5 milliGRAM(s) Oral daily  ascorbic acid 500 milliGRAM(s) Oral daily  aspirin 325 milliGRAM(s) Oral daily  atorvastatin 40 milliGRAM(s) Oral at bedtime  dextrose 5%. 1000 milliLiter(s) (50 mL/Hr) IV Continuous <Continuous>  dextrose 5%. 1000 milliLiter(s) (100 mL/Hr) IV Continuous <Continuous>  dextrose 50% Injectable 25 Gram(s) IV Push once  dextrose 50% Injectable 12.5 Gram(s) IV Push once  dextrose 50% Injectable 25 Gram(s) IV Push once  enoxaparin Injectable 30 milliGRAM(s) SubCutaneous every 24 hours  glucagon  Injectable 1 milliGRAM(s) IntraMuscular once  insulin lispro (ADMELOG) corrective regimen sliding scale   SubCutaneous three times a day before meals  insulin lispro (ADMELOG) corrective regimen sliding scale   SubCutaneous at bedtime  levothyroxine 75 MICROGram(s) Oral <User Schedule>  levothyroxine 50 MICROGram(s) Oral <User Schedule>  metoprolol tartrate 25 milliGRAM(s) Oral at bedtime  multivitamin 1 Tablet(s) Oral daily  sodium chloride 0.9%. 1000 milliLiter(s) (50 mL/Hr) IV Continuous <Continuous>      Assessment:  73-year-old female with history of HTN, CAD, carotid artery disease, DM, hypothyroid, and recent atraumatic right pelvic fracture (unclear exact fracture per patient but seems to indicate pubic rami) who presents with worsening right lower back pain which began approximately 4 to 5 days ago.  Patient states pain was mild initially at onset has been constant worsening since onset and denies any trauma or specific eliciting event.  No fever.  No numbness or weakness in extremities.  No loss of control of bowels or bladder.  No urinary symptoms no diarrhea.  No abdominal pain.  No nausea or vomiting. Further history from patient had right pelvic fracture which is likely his pubic rami though have no imaging in EMR and unclear history per patient.  Per her history no trauma or falls.    Recs:  cardiac stable  no e/o acs or chf  cw asa and cont with statin give hx of cad/pad  cw antihypertensives   diuretics per renal  pain control  ortho/nsgy eval - conservative management  s/p mri spine, results noted  dcp to paco          Over 25 minutes spent on total encounter; more than 50% of the visit was spent counseling and/or coordinating care by the attending physician.      Carlos Diego MD   Cardiovascular Disease  [phone] Cardiovascular Disease Progress Note    Overnight events: No acute events overnight.  no new cardiac sx  Otherwise review of systems negative    Objective Findings:  T(C): 36.4 (09-15-23 @ 05:57), Max: 36.9 (09-14-23 @ 11:49)  HR: 55 (09-15-23 @ 05:57) (55 - 67)  BP: 127/67 (09-15-23 @ 05:57) (127/63 - 128/70)  RR: 18 (09-15-23 @ 05:57) (18 - 18)  SpO2: 96% (09-15-23 @ 05:57) (96% - 98%)  Wt(kg): --  Daily     Daily       Physical Exam:  Gen: NAD  HEENT: EOMI  CV: RRR, normal S1 + S2, no m/r/g  Lungs: CTAB  Abd: soft, non-tender  Ext: No edema    Telemetry:    Laboratory Data:                        11.5   12.89 )-----------( 327      ( 15 Sep 2023 06:53 )             34.6     09-15    125<L>  |  94<L>  |  31<H>  ----------------------------<  136<H>  4.4   |  17<L>  |  1.23    Ca    9.1      15 Sep 2023 06:52                Inpatient Medications:  MEDICATIONS  (STANDING):  allopurinol 300 milliGRAM(s) Oral daily  amLODIPine   Tablet 5 milliGRAM(s) Oral daily  ascorbic acid 500 milliGRAM(s) Oral daily  aspirin 325 milliGRAM(s) Oral daily  atorvastatin 40 milliGRAM(s) Oral at bedtime  dextrose 5%. 1000 milliLiter(s) (50 mL/Hr) IV Continuous <Continuous>  dextrose 5%. 1000 milliLiter(s) (100 mL/Hr) IV Continuous <Continuous>  dextrose 50% Injectable 25 Gram(s) IV Push once  dextrose 50% Injectable 12.5 Gram(s) IV Push once  dextrose 50% Injectable 25 Gram(s) IV Push once  enoxaparin Injectable 30 milliGRAM(s) SubCutaneous every 24 hours  glucagon  Injectable 1 milliGRAM(s) IntraMuscular once  insulin lispro (ADMELOG) corrective regimen sliding scale   SubCutaneous three times a day before meals  insulin lispro (ADMELOG) corrective regimen sliding scale   SubCutaneous at bedtime  levothyroxine 75 MICROGram(s) Oral <User Schedule>  levothyroxine 50 MICROGram(s) Oral <User Schedule>  metoprolol tartrate 25 milliGRAM(s) Oral at bedtime  multivitamin 1 Tablet(s) Oral daily  sodium chloride 0.9%. 1000 milliLiter(s) (50 mL/Hr) IV Continuous <Continuous>      Assessment:  73-year-old female with history of HTN, CAD, carotid artery disease, DM, hypothyroid, and recent atraumatic right pelvic fracture (unclear exact fracture per patient but seems to indicate pubic rami) who presents with worsening right lower back pain which began approximately 4 to 5 days ago.  Patient states pain was mild initially at onset has been constant worsening since onset and denies any trauma or specific eliciting event.  No fever.  No numbness or weakness in extremities.  No loss of control of bowels or bladder.  No urinary symptoms no diarrhea.  No abdominal pain.  No nausea or vomiting. Further history from patient had right pelvic fracture which is likely his pubic rami though have no imaging in EMR and unclear history per patient.  Per her history no trauma or falls.    Recs:  cardiac stable  no e/o acs or chf  cw asa and cont with statin give hx of cad/pad  cw antihypertensives   diuretics per renal  pain control  ortho/nsgy eval - conservative management  s/p mri spine, results noted  dcp to paco          Over 25 minutes spent on total encounter; more than 50% of the visit was spent counseling and/or coordinating care by the attending physician.      Carlos Diego MD   Cardiovascular Disease  [phone] Cardiovascular Disease Progress Note    Overnight events: No acute events overnight.  no new cardiac sx  Otherwise review of systems negative    Objective Findings:  T(C): 36.4 (09-15-23 @ 05:57), Max: 36.9 (09-14-23 @ 11:49)  HR: 55 (09-15-23 @ 05:57) (55 - 67)  BP: 127/67 (09-15-23 @ 05:57) (127/63 - 128/70)  RR: 18 (09-15-23 @ 05:57) (18 - 18)  SpO2: 96% (09-15-23 @ 05:57) (96% - 98%)  Wt(kg): --  Daily     Daily       Physical Exam:  Gen: NAD  HEENT: EOMI  CV: RRR, normal S1 + S2, no m/r/g  Lungs: CTAB  Abd: soft, non-tender  Ext: No edema    Telemetry:    Laboratory Data:                        11.5   12.89 )-----------( 327      ( 15 Sep 2023 06:53 )             34.6     09-15    125<L>  |  94<L>  |  31<H>  ----------------------------<  136<H>  4.4   |  17<L>  |  1.23    Ca    9.1      15 Sep 2023 06:52                Inpatient Medications:  MEDICATIONS  (STANDING):  allopurinol 300 milliGRAM(s) Oral daily  amLODIPine   Tablet 5 milliGRAM(s) Oral daily  ascorbic acid 500 milliGRAM(s) Oral daily  aspirin 325 milliGRAM(s) Oral daily  atorvastatin 40 milliGRAM(s) Oral at bedtime  dextrose 5%. 1000 milliLiter(s) (50 mL/Hr) IV Continuous <Continuous>  dextrose 5%. 1000 milliLiter(s) (100 mL/Hr) IV Continuous <Continuous>  dextrose 50% Injectable 25 Gram(s) IV Push once  dextrose 50% Injectable 12.5 Gram(s) IV Push once  dextrose 50% Injectable 25 Gram(s) IV Push once  enoxaparin Injectable 30 milliGRAM(s) SubCutaneous every 24 hours  glucagon  Injectable 1 milliGRAM(s) IntraMuscular once  insulin lispro (ADMELOG) corrective regimen sliding scale   SubCutaneous three times a day before meals  insulin lispro (ADMELOG) corrective regimen sliding scale   SubCutaneous at bedtime  levothyroxine 75 MICROGram(s) Oral <User Schedule>  levothyroxine 50 MICROGram(s) Oral <User Schedule>  metoprolol tartrate 25 milliGRAM(s) Oral at bedtime  multivitamin 1 Tablet(s) Oral daily  sodium chloride 0.9%. 1000 milliLiter(s) (50 mL/Hr) IV Continuous <Continuous>      Assessment:  73-year-old female with history of HTN, CAD, carotid artery disease, DM, hypothyroid, and recent atraumatic right pelvic fracture (unclear exact fracture per patient but seems to indicate pubic rami) who presents with worsening right lower back pain which began approximately 4 to 5 days ago.  Patient states pain was mild initially at onset has been constant worsening since onset and denies any trauma or specific eliciting event.  No fever.  No numbness or weakness in extremities.  No loss of control of bowels or bladder.  No urinary symptoms no diarrhea.  No abdominal pain.  No nausea or vomiting. Further history from patient had right pelvic fracture which is likely his pubic rami though have no imaging in EMR and unclear history per patient.  Per her history no trauma or falls.    Recs:  cardiac stable  no e/o acs or chf  cw asa and cont with statin give hx of cad/pad  cw antihypertensives   diuretics per renal  pain control  ortho/nsgy eval - conservative management  s/p mri spine, results noted  dcp to paco          Over 25 minutes spent on total encounter; more than 50% of the visit was spent counseling and/or coordinating care by the attending physician.      Carlos Diego MD   Cardiovascular Disease  [phone]

## 2023-09-15 NOTE — PROGRESS NOTE ADULT - SUBJECTIVE AND OBJECTIVE BOX
Patient is a 73y old  Female who presents with a chief complaint of Pt is a 73-year-old female with history of HTN, CAD and unclear open heart surgery (per patient may be CABG, on  daily), DM, hypothyroid, and recent atraumatic right pelvic fracture (unclear exact fracture per patient but seems to indicate pubic rami) who presents with worsening right lower back pain     (10 Sep 2023 10:59)      SUBJECTIVE / OVERNIGHT EVENTS: Comfortable without new complaints.   Review of Systems  chest pain no  palpitations no  sob no  nausea no  headache no    MEDICATIONS  (STANDING):  allopurinol 300 milliGRAM(s) Oral daily  amLODIPine   Tablet 5 milliGRAM(s) Oral daily  ascorbic acid 500 milliGRAM(s) Oral daily  aspirin 325 milliGRAM(s) Oral daily  atorvastatin 40 milliGRAM(s) Oral at bedtime  dextrose 5%. 1000 milliLiter(s) (50 mL/Hr) IV Continuous <Continuous>  dextrose 5%. 1000 milliLiter(s) (100 mL/Hr) IV Continuous <Continuous>  dextrose 50% Injectable 25 Gram(s) IV Push once  dextrose 50% Injectable 12.5 Gram(s) IV Push once  dextrose 50% Injectable 25 Gram(s) IV Push once  enoxaparin Injectable 30 milliGRAM(s) SubCutaneous every 24 hours  glucagon  Injectable 1 milliGRAM(s) IntraMuscular once  insulin lispro (ADMELOG) corrective regimen sliding scale   SubCutaneous three times a day before meals  insulin lispro (ADMELOG) corrective regimen sliding scale   SubCutaneous at bedtime  levothyroxine 75 MICROGram(s) Oral <User Schedule>  levothyroxine 50 MICROGram(s) Oral <User Schedule>  metoprolol tartrate 25 milliGRAM(s) Oral at bedtime  multivitamin 1 Tablet(s) Oral daily  sodium bicarbonate 650 milliGRAM(s) Oral three times a day    MEDICATIONS  (PRN):  acetaminophen     Tablet .. 650 milliGRAM(s) Oral every 6 hours PRN Temp greater or equal to 38.5C (101.3F), Mild Pain (1 - 3)  dextrose Oral Gel 15 Gram(s) Oral once PRN Blood Glucose LESS THAN 70 milliGRAM(s)/deciliter      Vital Signs Last 24 Hrs  T(C): 36.4 (15 Sep 2023 12:18), Max: 36.9 (14 Sep 2023 20:12)  T(F): 97.6 (15 Sep 2023 12:18), Max: 98.5 (14 Sep 2023 20:12)  HR: 61 (15 Sep 2023 12:18) (55 - 67)  BP: 132/78 (15 Sep 2023 12:18) (127/63 - 132/78)  BP(mean): --  RR: 18 (15 Sep 2023 12:18) (18 - 18)  SpO2: 96% (15 Sep 2023 12:18) (96% - 98%)    Parameters below as of 15 Sep 2023 12:18  Patient On (Oxygen Delivery Method): room air        PHYSICAL EXAM:  GENERAL: NAD  HEAD:  Atraumatic, Normocephalic  EYES: EOMI, PERRLA, conjunctiva and sclera clear  NECK: Supple, No JVD  CHEST/LUNG: Clear to auscultation bilaterally; No wheeze  HEART: Regular rate and rhythm; No murmurs, rubs, or gallops  ABDOMEN: Soft, Nontender, Nondistended; Bowel sounds present  EXTREMITIES:  2+ Peripheral Pulses, No clubbing, cyanosis, or edema  PSYCH: AAOx3  NEUROLOGY: non-focal  SKIN: No rashes or lesions    LABS:                        11.5   12.89 )-----------( 327      ( 15 Sep 2023 06:53 )             34.6     09-15    122<L>  |  90<L>  |  27<H>  ----------------------------<  183<H>  4.8   |  16<L>  |  1.07    Ca    9.5      15 Sep 2023 12:49            Urinalysis Basic - ( 15 Sep 2023 12:49 )    Color: x / Appearance: x / SG: x / pH: x  Gluc: 183 mg/dL / Ketone: x  / Bili: x / Urobili: x   Blood: x / Protein: x / Nitrite: x   Leuk Esterase: x / RBC: x / WBC x   Sq Epi: x / Non Sq Epi: x / Bacteria: x          RADIOLOGY & ADDITIONAL TESTS:    Imaging Personally Reviewed:    Consultant(s) Notes Reviewed:      Care Discussed with Consultants/Other Providers:

## 2023-09-15 NOTE — PROGRESS NOTE ADULT - ASSESSMENT
73-year-old female with history of HTN, CAD and unclear open heart surgery (per patient may be CABG, on  daily), DM, hypothyroid, and recent atraumatic right pelvic fracture  who presents with worsening right lower back pain      1- hyponatremia  2- ckd III   3- acidosis   4- HTN   5- hx chf      creatinine steady  sodium slowly improving  hold diuretics  1L po fluid restriction  acidosis is worsening  beta hydroxy butyrate -> 0.2  VBG with lytes ph and lactate in range  add sodium bicarb tabs 650 mg TID  pain control  cont norvasc 5 mg daily  trend bmp in am

## 2023-09-16 LAB
ANION GAP SERPL CALC-SCNC: 14 MMOL/L — SIGNIFICANT CHANGE UP (ref 5–17)
BUN SERPL-MCNC: 27 MG/DL — HIGH (ref 7–23)
CALCIUM SERPL-MCNC: 9.3 MG/DL — SIGNIFICANT CHANGE UP (ref 8.4–10.5)
CHLORIDE SERPL-SCNC: 93 MMOL/L — LOW (ref 96–108)
CO2 SERPL-SCNC: 17 MMOL/L — LOW (ref 22–31)
CREAT SERPL-MCNC: 1.1 MG/DL — SIGNIFICANT CHANGE UP (ref 0.5–1.3)
EGFR: 52 ML/MIN/1.73M2 — LOW
GLUCOSE BLDC GLUCOMTR-MCNC: 117 MG/DL — HIGH (ref 70–99)
GLUCOSE BLDC GLUCOMTR-MCNC: 138 MG/DL — HIGH (ref 70–99)
GLUCOSE BLDC GLUCOMTR-MCNC: 225 MG/DL — HIGH (ref 70–99)
GLUCOSE BLDC GLUCOMTR-MCNC: 244 MG/DL — HIGH (ref 70–99)
GLUCOSE SERPL-MCNC: 136 MG/DL — HIGH (ref 70–99)
HCT VFR BLD CALC: 32.5 % — LOW (ref 34.5–45)
HGB BLD-MCNC: 10.9 G/DL — LOW (ref 11.5–15.5)
MCHC RBC-ENTMCNC: 30.9 PG — SIGNIFICANT CHANGE UP (ref 27–34)
MCHC RBC-ENTMCNC: 33.5 GM/DL — SIGNIFICANT CHANGE UP (ref 32–36)
MCV RBC AUTO: 92.1 FL — SIGNIFICANT CHANGE UP (ref 80–100)
NRBC # BLD: 0 /100 WBCS — SIGNIFICANT CHANGE UP (ref 0–0)
PLATELET # BLD AUTO: 328 K/UL — SIGNIFICANT CHANGE UP (ref 150–400)
POTASSIUM SERPL-MCNC: 4.8 MMOL/L — SIGNIFICANT CHANGE UP (ref 3.5–5.3)
POTASSIUM SERPL-SCNC: 4.8 MMOL/L — SIGNIFICANT CHANGE UP (ref 3.5–5.3)
RBC # BLD: 3.53 M/UL — LOW (ref 3.8–5.2)
RBC # FLD: 14.2 % — SIGNIFICANT CHANGE UP (ref 10.3–14.5)
SODIUM SERPL-SCNC: 124 MMOL/L — LOW (ref 135–145)
WBC # BLD: 11.83 K/UL — HIGH (ref 3.8–10.5)
WBC # FLD AUTO: 11.83 K/UL — HIGH (ref 3.8–10.5)

## 2023-09-16 RX ADMIN — Medication 75 MICROGRAM(S): at 10:11

## 2023-09-16 RX ADMIN — Medication 650 MILLIGRAM(S): at 11:13

## 2023-09-16 RX ADMIN — AMLODIPINE BESYLATE 5 MILLIGRAM(S): 2.5 TABLET ORAL at 05:49

## 2023-09-16 RX ADMIN — Medication 1 TABLET(S): at 11:13

## 2023-09-16 RX ADMIN — Medication 650 MILLIGRAM(S): at 21:31

## 2023-09-16 RX ADMIN — ENOXAPARIN SODIUM 30 MILLIGRAM(S): 100 INJECTION SUBCUTANEOUS at 05:50

## 2023-09-16 RX ADMIN — Medication 25 MILLIGRAM(S): at 21:30

## 2023-09-16 RX ADMIN — Medication 500 MILLIGRAM(S): at 11:13

## 2023-09-16 RX ADMIN — Medication 325 MILLIGRAM(S): at 11:12

## 2023-09-16 RX ADMIN — Medication 300 MILLIGRAM(S): at 11:13

## 2023-09-16 RX ADMIN — Medication 4: at 12:54

## 2023-09-16 RX ADMIN — Medication 650 MILLIGRAM(S): at 05:49

## 2023-09-16 RX ADMIN — ATORVASTATIN CALCIUM 40 MILLIGRAM(S): 80 TABLET, FILM COATED ORAL at 21:31

## 2023-09-16 NOTE — PROGRESS NOTE ADULT - SUBJECTIVE AND OBJECTIVE BOX
Cardiovascular Disease Progress Note    Overnight events: No acute events overnight.  no new cardiac sx  Otherwise review of systems negative    Objective Findings:  T(C): 36.6 (09-16-23 @ 04:55), Max: 37.1 (09-15-23 @ 20:33)  HR: 53 (09-16-23 @ 04:55) (53 - 64)  BP: 125/63 (09-16-23 @ 04:55) (125/63 - 146/62)  RR: 18 (09-16-23 @ 04:55) (18 - 18)  SpO2: 97% (09-16-23 @ 04:55) (96% - 98%)  Wt(kg): --  Daily     Daily       Physical Exam:  Gen: NAD  HEENT: EOMI  CV: RRR, normal S1 + S2, no m/r/g  Lungs: CTAB  Abd: soft, non-tender  Ext: No edema    Telemetry:    Laboratory Data:                        10.9   11.83 )-----------( 328      ( 16 Sep 2023 06:40 )             32.5     09-16    124<L>  |  93<L>  |  27<H>  ----------------------------<  136<H>  4.8   |  17<L>  |  1.10    Ca    9.3      16 Sep 2023 06:38                Inpatient Medications:  MEDICATIONS  (STANDING):  allopurinol 300 milliGRAM(s) Oral daily  amLODIPine   Tablet 5 milliGRAM(s) Oral daily  ascorbic acid 500 milliGRAM(s) Oral daily  aspirin 325 milliGRAM(s) Oral daily  atorvastatin 40 milliGRAM(s) Oral at bedtime  dextrose 5%. 1000 milliLiter(s) (100 mL/Hr) IV Continuous <Continuous>  dextrose 5%. 1000 milliLiter(s) (50 mL/Hr) IV Continuous <Continuous>  dextrose 50% Injectable 25 Gram(s) IV Push once  dextrose 50% Injectable 12.5 Gram(s) IV Push once  dextrose 50% Injectable 25 Gram(s) IV Push once  enoxaparin Injectable 30 milliGRAM(s) SubCutaneous every 24 hours  glucagon  Injectable 1 milliGRAM(s) IntraMuscular once  insulin lispro (ADMELOG) corrective regimen sliding scale   SubCutaneous three times a day before meals  insulin lispro (ADMELOG) corrective regimen sliding scale   SubCutaneous at bedtime  levothyroxine 50 MICROGram(s) Oral <User Schedule>  levothyroxine 75 MICROGram(s) Oral <User Schedule>  metoprolol tartrate 25 milliGRAM(s) Oral at bedtime  multivitamin 1 Tablet(s) Oral daily  sodium bicarbonate 650 milliGRAM(s) Oral three times a day      Assessment:  73-year-old female with history of HTN, CAD, carotid artery disease, DM, hypothyroid, and recent atraumatic right pelvic fracture (unclear exact fracture per patient but seems to indicate pubic rami) who presents with worsening right lower back pain which began approximately 4 to 5 days ago.  Patient states pain was mild initially at onset has been constant worsening since onset and denies any trauma or specific eliciting event.  No fever.  No numbness or weakness in extremities.  No loss of control of bowels or bladder.  No urinary symptoms no diarrhea.  No abdominal pain.  No nausea or vomiting. Further history from patient had right pelvic fracture which is likely his pubic rami though have no imaging in EMR and unclear history per patient.  Per her history no trauma or falls.    Recs:  cardiac stable  no e/o acs or chf  cw asa and cont with statin give hx of cad/pad  cw antihypertensives   diuretics on hold per renal  pain control  ortho/nsgy eval - conservative management  s/p mri spine, results noted      Over 25 minutes spent on total encounter; more than 50% of the visit was spent counseling and/or coordinating care by the attending physician.      Carlos Diego MD   Cardiovascular Disease  [phone] Cardiovascular Disease Progress Note    Overnight events: No acute events overnight.  no new cardiac sx  Otherwise review of systems negative    Objective Findings:  T(C): 36.6 (09-16-23 @ 04:55), Max: 37.1 (09-15-23 @ 20:33)  HR: 53 (09-16-23 @ 04:55) (53 - 64)  BP: 125/63 (09-16-23 @ 04:55) (125/63 - 146/62)  RR: 18 (09-16-23 @ 04:55) (18 - 18)  SpO2: 97% (09-16-23 @ 04:55) (96% - 98%)  Wt(kg): --  Daily     Daily       Physical Exam:  Gen: NAD  HEENT: EOMI  CV: RRR, normal S1 + S2, no m/r/g  Lungs: CTAB  Abd: soft, non-tender  Ext: No edema    Telemetry:    Laboratory Data:                        10.9   11.83 )-----------( 328      ( 16 Sep 2023 06:40 )             32.5     09-16    124<L>  |  93<L>  |  27<H>  ----------------------------<  136<H>  4.8   |  17<L>  |  1.10    Ca    9.3      16 Sep 2023 06:38                Inpatient Medications:  MEDICATIONS  (STANDING):  allopurinol 300 milliGRAM(s) Oral daily  amLODIPine   Tablet 5 milliGRAM(s) Oral daily  ascorbic acid 500 milliGRAM(s) Oral daily  aspirin 325 milliGRAM(s) Oral daily  atorvastatin 40 milliGRAM(s) Oral at bedtime  dextrose 5%. 1000 milliLiter(s) (100 mL/Hr) IV Continuous <Continuous>  dextrose 5%. 1000 milliLiter(s) (50 mL/Hr) IV Continuous <Continuous>  dextrose 50% Injectable 25 Gram(s) IV Push once  dextrose 50% Injectable 12.5 Gram(s) IV Push once  dextrose 50% Injectable 25 Gram(s) IV Push once  enoxaparin Injectable 30 milliGRAM(s) SubCutaneous every 24 hours  glucagon  Injectable 1 milliGRAM(s) IntraMuscular once  insulin lispro (ADMELOG) corrective regimen sliding scale   SubCutaneous three times a day before meals  insulin lispro (ADMELOG) corrective regimen sliding scale   SubCutaneous at bedtime  levothyroxine 50 MICROGram(s) Oral <User Schedule>  levothyroxine 75 MICROGram(s) Oral <User Schedule>  metoprolol tartrate 25 milliGRAM(s) Oral at bedtime  multivitamin 1 Tablet(s) Oral daily  sodium bicarbonate 650 milliGRAM(s) Oral three times a day      Assessment:  73-year-old female with history of HTN, CAD, carotid artery disease, DM, hypothyroid, and recent atraumatic right pelvic fracture (unclear exact fracture per patient but seems to indicate pubic rami) who presents with worsening right lower back pain which began approximately 4 to 5 days ago.  Patient states pain was mild initially at onset has been constant worsening since onset and denies any trauma or specific eliciting event.  No fever.  No numbness or weakness in extremities.  No loss of control of bowels or bladder.  No urinary symptoms no diarrhea.  No abdominal pain.  No nausea or vomiting. Further history from patient had right pelvic fracture which is likely his pubic rami though have no imaging in EMR and unclear history per patient.  Per her history no trauma or falls.    Recs:  cardiac stable  no e/o acs or chf  cw asa and cont with statin give hx of cad/pad  cw antihypertensives   diuretics on hold per renal  pain control  ortho/nsgy eval - conservative management  s/p mri spine, results noted      Over 25 minutes spent on total encounter; more than 50% of the visit was spent counseling and/or coordinating care by the attending physician.      Carlos Diego MD   Cardiovascular Disease  [phone] Cardiovascular Disease Progress Note    Overnight events: No acute events overnight.  no new cardiac sx  Otherwise review of systems negative    Objective Findings:  T(C): 36.6 (09-16-23 @ 04:55), Max: 37.1 (09-15-23 @ 20:33)  HR: 53 (09-16-23 @ 04:55) (53 - 64)  BP: 125/63 (09-16-23 @ 04:55) (125/63 - 146/62)  RR: 18 (09-16-23 @ 04:55) (18 - 18)  SpO2: 97% (09-16-23 @ 04:55) (96% - 98%)  Wt(kg): --  Daily     Daily       Physical Exam:  Gen: NAD  HEENT: EOMI  CV: RRR, normal S1 + S2, no m/r/g  Lungs: CTAB  Abd: soft, non-tender  Ext: No edema    Telemetry:    Laboratory Data:                        10.9   11.83 )-----------( 328      ( 16 Sep 2023 06:40 )             32.5     09-16    124<L>  |  93<L>  |  27<H>  ----------------------------<  136<H>  4.8   |  17<L>  |  1.10    Ca    9.3      16 Sep 2023 06:38                Inpatient Medications:  MEDICATIONS  (STANDING):  allopurinol 300 milliGRAM(s) Oral daily  amLODIPine   Tablet 5 milliGRAM(s) Oral daily  ascorbic acid 500 milliGRAM(s) Oral daily  aspirin 325 milliGRAM(s) Oral daily  atorvastatin 40 milliGRAM(s) Oral at bedtime  dextrose 5%. 1000 milliLiter(s) (100 mL/Hr) IV Continuous <Continuous>  dextrose 5%. 1000 milliLiter(s) (50 mL/Hr) IV Continuous <Continuous>  dextrose 50% Injectable 25 Gram(s) IV Push once  dextrose 50% Injectable 12.5 Gram(s) IV Push once  dextrose 50% Injectable 25 Gram(s) IV Push once  enoxaparin Injectable 30 milliGRAM(s) SubCutaneous every 24 hours  glucagon  Injectable 1 milliGRAM(s) IntraMuscular once  insulin lispro (ADMELOG) corrective regimen sliding scale   SubCutaneous three times a day before meals  insulin lispro (ADMELOG) corrective regimen sliding scale   SubCutaneous at bedtime  levothyroxine 50 MICROGram(s) Oral <User Schedule>  levothyroxine 75 MICROGram(s) Oral <User Schedule>  metoprolol tartrate 25 milliGRAM(s) Oral at bedtime  multivitamin 1 Tablet(s) Oral daily  sodium bicarbonate 650 milliGRAM(s) Oral three times a day      Assessment:  73-year-old female with history of HTN, CAD, carotid artery disease, DM, hypothyroid, and recent atraumatic right pelvic fracture (unclear exact fracture per patient but seems to indicate pubic rami) who presents with worsening right lower back pain which began approximately 4 to 5 days ago.  Patient states pain was mild initially at onset has been constant worsening since onset and denies any trauma or specific eliciting event.  No fever.  No numbness or weakness in extremities.  No loss of control of bowels or bladder.  No urinary symptoms no diarrhea.  No abdominal pain.  No nausea or vomiting. Further history from patient had right pelvic fracture which is likely his pubic rami though have no imaging in EMR and unclear history per patient.  Per her history no trauma or falls.    Recs:  cardiac stable  no e/o acs or chf  cw asa and cont with statin give hx of cad/pad  cw antihypertensives   diuretics on hold per renal  pain control  ortho/nsgy eval - conservative management  s/p mri spine, results noted      Over 25 minutes spent on total encounter; more than 50% of the visit was spent counseling and/or coordinating care by the attending physician.      Carlos Diego MD   Cardiovascular Disease  [phone]

## 2023-09-16 NOTE — PROGRESS NOTE ADULT - ASSESSMENT
73 f with    Back pain/ Sacral fracture  - pain control  - MRI LS spine noted  - CT pelvis noted  - PT  - Neurosurgery evaluation noted. No intervention     Hypercalcemia  - follow  - PTH  - Vit D    CAD (coronary artery disease)   - stable  - Echo  - Cardiology evaluation dr Diego     Diabetes mellitus   - ADA diet  - BS control    H/O pericardiotomy   - Echo    History of gout   - continue Rx  - Uric acid    Hyperlipidemia   - stable    Hypertension   - control    Hypothyroidism   - supplement  - follow TSH    JONATHAN  - follow Cr, lytes  - Hold Lasix and Aldactone.    Hyponatremia  - Nephrology follow Dr. Varela    DVT prophylaxis     DCP in progress when Nephrology cleared.     Guido Rolon MD phone [phone]      73 f with    Back pain/ Sacral fracture  - pain control  - MRI LS spine noted  - CT pelvis noted  - PT  - Neurosurgery evaluation noted. No intervention     Hypercalcemia  - follow  - PTH  - Vit D    CAD (coronary artery disease)   - stable  - Echo  - Cardiology evaluation dr Diego     Diabetes mellitus   - ADA diet  - BS control    H/O pericardiotomy   - Echo    History of gout   - continue Rx  - Uric acid    Hyperlipidemia   - stable    Hypertension   - control    Hypothyroidism   - supplement  - follow TSH    JONATHAN  - follow Cr, lytes  - Hold Lasix and Aldactone.    Hyponatremia  - Nephrology follow Dr. Varela    DVT prophylaxis     DCP in progress when Nephrology cleared.     Guido oRlon MD phone [phone]      73 f with    Back pain/ Sacral fracture  - pain control  - MRI LS spine noted  - CT pelvis noted  - PT  - Neurosurgery evaluation noted. No intervention     Hypercalcemia  - follow  - PTH  - Vit D    CAD (coronary artery disease)   - stable  - Echo  - Cardiology evaluation dr Diego     Diabetes mellitus   - ADA diet  - BS control    H/O pericardiotomy   - Echo    History of gout   - continue Rx  - Uric acid    Hyperlipidemia   - stable    Hypertension   - control    Hypothyroidism   - supplement  - follow TSH    JONATHAN  - follow Cr, lytes  - Hold Lasix and Aldactone.    Hyponatremia  - Nephrology follow Dr. Vraela    DVT prophylaxis     DCP in progress when Nephrology cleared.     Guido Rolon MD phone [phone]

## 2023-09-16 NOTE — PROGRESS NOTE ADULT - ASSESSMENT
73-year-old female with history of HTN, CAD and unclear open heart surgery (per patient may be CABG, on  daily), DM, hypothyroid, and recent atraumatic right pelvic fracture  who presents with worsening right lower back pain      1- hyponatremia  2- ckd III   3- acidosis   4- HTN   5- hx chf      creatinine steady  sodium slowly improving  hold diuretics  1L po fluid restriction  acidosis is worsening  beta hydroxy butyrate -> 0.2  VBG with lytes ph and lactate in range  added sodium bicarb tabs 650 mg TID  na is improving trend bicarb   pain control  cont norvasc 5 mg daily  trend bmp in am

## 2023-09-16 NOTE — PROGRESS NOTE ADULT - SUBJECTIVE AND OBJECTIVE BOX
Patient is a 73y old  Female who presents with a chief complaint of Pt is a 73-year-old female with history of HTN, CAD and unclear open heart surgery (per patient may be CABG, on  daily), DM, hypothyroid, and recent atraumatic right pelvic fracture (unclear exact fracture per patient but seems to indicate pubic rami) who presents with worsening right lower back pain     (10 Sep 2023 10:59)      SUBJECTIVE / OVERNIGHT EVENTS: Comfortable without new complaints.   Review of Systems  chest pain no  palpitations no  sob no  nausea no  headache no    MEDICATIONS  (STANDING):  allopurinol 300 milliGRAM(s) Oral daily  amLODIPine   Tablet 5 milliGRAM(s) Oral daily  ascorbic acid 500 milliGRAM(s) Oral daily  aspirin 325 milliGRAM(s) Oral daily  atorvastatin 40 milliGRAM(s) Oral at bedtime  dextrose 5%. 1000 milliLiter(s) (100 mL/Hr) IV Continuous <Continuous>  dextrose 5%. 1000 milliLiter(s) (50 mL/Hr) IV Continuous <Continuous>  dextrose 50% Injectable 25 Gram(s) IV Push once  dextrose 50% Injectable 12.5 Gram(s) IV Push once  dextrose 50% Injectable 25 Gram(s) IV Push once  enoxaparin Injectable 30 milliGRAM(s) SubCutaneous every 24 hours  glucagon  Injectable 1 milliGRAM(s) IntraMuscular once  insulin lispro (ADMELOG) corrective regimen sliding scale   SubCutaneous three times a day before meals  insulin lispro (ADMELOG) corrective regimen sliding scale   SubCutaneous at bedtime  levothyroxine 75 MICROGram(s) Oral <User Schedule>  levothyroxine 50 MICROGram(s) Oral <User Schedule>  metoprolol tartrate 25 milliGRAM(s) Oral at bedtime  multivitamin 1 Tablet(s) Oral daily  sodium bicarbonate 650 milliGRAM(s) Oral three times a day    MEDICATIONS  (PRN):  acetaminophen     Tablet .. 650 milliGRAM(s) Oral every 6 hours PRN Temp greater or equal to 38.5C (101.3F), Mild Pain (1 - 3)  dextrose Oral Gel 15 Gram(s) Oral once PRN Blood Glucose LESS THAN 70 milliGRAM(s)/deciliter      Vital Signs Last 24 Hrs  T(C): 36.5 (16 Sep 2023 11:57), Max: 37.1 (15 Sep 2023 20:33)  T(F): 97.7 (16 Sep 2023 11:57), Max: 98.7 (15 Sep 2023 20:33)  HR: 57 (16 Sep 2023 11:57) (53 - 64)  BP: 114/72 (16 Sep 2023 11:57) (114/72 - 146/62)  BP(mean): --  RR: 18 (16 Sep 2023 11:57) (18 - 18)  SpO2: 97% (16 Sep 2023 11:57) (97% - 98%)    Parameters below as of 16 Sep 2023 11:57  Patient On (Oxygen Delivery Method): room air        PHYSICAL EXAM:  GENERAL: NAD   HEAD:  Atraumatic, Normocephalic  EYES: EOMI, PERRLA, conjunctiva and sclera clear  NECK: Supple, No JVD  CHEST/LUNG: Clear to auscultation bilaterally; No wheeze  HEART: Regular rate and rhythm; No murmurs, rubs, or gallops  ABDOMEN: Soft, Nontender, Nondistended; Bowel sounds present  EXTREMITIES:  2+ Peripheral Pulses, No clubbing, cyanosis, or edema  PSYCH: AAOx3  NEUROLOGY: non-focal  SKIN: No rashes or lesions    LABS:                        10.9   11.83 )-----------( 328      ( 16 Sep 2023 06:40 )             32.5     09-16    124<L>  |  93<L>  |  27<H>  ----------------------------<  136<H>  4.8   |  17<L>  |  1.10    Ca    9.3      16 Sep 2023 06:38            Urinalysis Basic - ( 16 Sep 2023 06:38 )    Color: x / Appearance: x / SG: x / pH: x  Gluc: 136 mg/dL / Ketone: x  / Bili: x / Urobili: x   Blood: x / Protein: x / Nitrite: x   Leuk Esterase: x / RBC: x / WBC x   Sq Epi: x / Non Sq Epi: x / Bacteria: x          RADIOLOGY & ADDITIONAL TESTS:    Imaging Personally Reviewed:    Consultant(s) Notes Reviewed:      Care Discussed with Consultants/Other Providers:

## 2023-09-16 NOTE — PROGRESS NOTE ADULT - SUBJECTIVE AND OBJECTIVE BOX
[facility]   [phone]  RENAL FOLLOW UP NOTE  --------------------------------------------------------------------------------  Chief Complaint:    24 hour events/subjective:    seen earlier   denies worsening nausea   does have pelvic area pain     PAST HISTORY  --------------------------------------------------------------------------------  No significant changes to PMH, PSH, FHx, SHx, unless otherwise noted    ALLERGIES & MEDICATIONS  --------------------------------------------------------------------------------  Allergies    penicillin (Unknown)    Intolerances      Standing Inpatient Medications  allopurinol 300 milliGRAM(s) Oral daily  amLODIPine   Tablet 5 milliGRAM(s) Oral daily  ascorbic acid 500 milliGRAM(s) Oral daily  aspirin 325 milliGRAM(s) Oral daily  atorvastatin 40 milliGRAM(s) Oral at bedtime  dextrose 5%. 1000 milliLiter(s) IV Continuous <Continuous>  dextrose 5%. 1000 milliLiter(s) IV Continuous <Continuous>  dextrose 50% Injectable 25 Gram(s) IV Push once  dextrose 50% Injectable 12.5 Gram(s) IV Push once  dextrose 50% Injectable 25 Gram(s) IV Push once  enoxaparin Injectable 30 milliGRAM(s) SubCutaneous every 24 hours  glucagon  Injectable 1 milliGRAM(s) IntraMuscular once  insulin lispro (ADMELOG) corrective regimen sliding scale   SubCutaneous three times a day before meals  insulin lispro (ADMELOG) corrective regimen sliding scale   SubCutaneous at bedtime  levothyroxine 50 MICROGram(s) Oral <User Schedule>  levothyroxine 75 MICROGram(s) Oral <User Schedule>  metoprolol tartrate 25 milliGRAM(s) Oral at bedtime  multivitamin 1 Tablet(s) Oral daily  sodium bicarbonate 650 milliGRAM(s) Oral three times a day    PRN Inpatient Medications  acetaminophen     Tablet .. 650 milliGRAM(s) Oral every 6 hours PRN  dextrose Oral Gel 15 Gram(s) Oral once PRN      REVIEW OF SYSTEMS  --------------------------------------------------------------------------------    Gen: denies  fevers/chills,  CVS: denies chest pain/palpitations  Resp: denies SOB/Cough  GI: Denies N/V/Abd pain  : Denies dysuria/oliguria/hematuria    =]    VITALS/PHYSICAL EXAM  --------------------------------------------------------------------------------  T(C): 37 (09-16-23 @ 21:48), Max: 37 (09-16-23 @ 21:48)  HR: 58 (09-16-23 @ 21:48) (53 - 58)  BP: 134/72 (09-16-23 @ 21:48) (114/72 - 134/72)  RR: 18 (09-16-23 @ 21:48) (18 - 18)  SpO2: 99% (09-16-23 @ 21:48) (97% - 99%)  Wt(kg): --        09-15-23 @ 07:01  -  09-16-23 @ 07:00  --------------------------------------------------------  IN: 480 mL / OUT: 0 mL / NET: 480 mL    09-16-23 @ 07:01  -  09-16-23 @ 22:56  --------------------------------------------------------  IN: 480 mL / OUT: 0 mL / NET: 480 mL      Physical Exam:  	    Gen: Non toxic comfortable appearing   	Pulm: decrease bs  no rales or ronchi or wheezing  	CV: No JVD. RRR, S1S2; no rub  	Abd: +BS, soft, nontender/nondistended  	: No suprapubic tenderness  	UE: Warm, no cyanosis  no clubbing,  no edema;  	LE: Warm, no cyanosis  no clubbing, no edema      LABS/STUDIES  --------------------------------------------------------------------------------              10.9   11.83 >-----------<  328      [09-16-23 @ 06:40]              32.5     124  |  93  |  27  ----------------------------<  136      [09-16-23 @ 06:38]  4.8   |  17  |  1.10        Ca     9.3     [09-16-23 @ 06:38]            Creatinine Trend:  SCr 1.10 [09-16 @ 06:38]  SCr 1.07 [09-15 @ 12:49]  SCr 1.23 [09-15 @ 06:52]  SCr 1.31 [09-14 @ 06:53]  SCr 1.28 [09-13 @ 07:28]        Urine Sodium 86      [09-13-23 @ 18:44]  Urine Osmolality 301      [09-13-23 @ 18:44]    PTH -- (Ca 9.7)      [09-08-23 @ 06:57]   35  TSH 3.78      [09-08-23 @ 06:57]         [facility]   [phone]  RENAL FOLLOW UP NOTE  --------------------------------------------------------------------------------  Chief Complaint:    24 hour events/subjective:    seen earlier   denies worsening nausea   does have pelvic area pain     PAST HISTORY  --------------------------------------------------------------------------------  No significant changes to PMH, PSH, FHx, SHx, unless otherwise noted    ALLERGIES & MEDICATIONS  --------------------------------------------------------------------------------  Allergies    penicillin (Unknown)    Intolerances      Standing Inpatient Medications  allopurinol 300 milliGRAM(s) Oral daily  amLODIPine   Tablet 5 milliGRAM(s) Oral daily  ascorbic acid 500 milliGRAM(s) Oral daily  aspirin 325 milliGRAM(s) Oral daily  atorvastatin 40 milliGRAM(s) Oral at bedtime  dextrose 5%. 1000 milliLiter(s) IV Continuous <Continuous>  dextrose 5%. 1000 milliLiter(s) IV Continuous <Continuous>  dextrose 50% Injectable 25 Gram(s) IV Push once  dextrose 50% Injectable 12.5 Gram(s) IV Push once  dextrose 50% Injectable 25 Gram(s) IV Push once  enoxaparin Injectable 30 milliGRAM(s) SubCutaneous every 24 hours  glucagon  Injectable 1 milliGRAM(s) IntraMuscular once  insulin lispro (ADMELOG) corrective regimen sliding scale   SubCutaneous three times a day before meals  insulin lispro (ADMELOG) corrective regimen sliding scale   SubCutaneous at bedtime  levothyroxine 50 MICROGram(s) Oral <User Schedule>  levothyroxine 75 MICROGram(s) Oral <User Schedule>  metoprolol tartrate 25 milliGRAM(s) Oral at bedtime  multivitamin 1 Tablet(s) Oral daily  sodium bicarbonate 650 milliGRAM(s) Oral three times a day    PRN Inpatient Medications  acetaminophen     Tablet .. 650 milliGRAM(s) Oral every 6 hours PRN  dextrose Oral Gel 15 Gram(s) Oral once PRN      REVIEW OF SYSTEMS  --------------------------------------------------------------------------------    Gen: denies  fevers/chills,  CVS: denies chest pain/palpitations  Resp: denies SOB/Cough  GI: Denies N/V/Abd pain  : Denies dysuria/oliguria/hematuria    =]    VITALS/PHYSICAL EXAM  --------------------------------------------------------------------------------  T(C): 37 (09-16-23 @ 21:48), Max: 37 (09-16-23 @ 21:48)  HR: 58 (09-16-23 @ 21:48) (53 - 58)  BP: 134/72 (09-16-23 @ 21:48) (114/72 - 134/72)  RR: 18 (09-16-23 @ 21:48) (18 - 18)  SpO2: 99% (09-16-23 @ 21:48) (97% - 99%)  Wt(kg): --        09-15-23 @ 07:01  -  09-16-23 @ 07:00  --------------------------------------------------------  IN: 480 mL / OUT: 0 mL / NET: 480 mL    09-16-23 @ 07:01  -  09-16-23 @ 22:56  --------------------------------------------------------  IN: 480 mL / OUT: 0 mL / NET: 480 mL      Physical Exam:  	    Gen: Non toxic comfortable appearing   	Pulm: decrease bs  no rales or ronchi or wheezing  	CV: No JVD. RRR, S1S2; no rub  	Abd: +BS, soft, nontender/nondistended  	: No suprapubic tenderness  	UE: Warm, no cyanosis  no clubbing,  no edema;  	LE: Warm, no cyanosis  no clubbing, no edema      LABS/STUDIES  --------------------------------------------------------------------------------              10.9   11.83 >-----------<  328      [09-16-23 @ 06:40]              32.5     124  |  93  |  27  ----------------------------<  136      [09-16-23 @ 06:38]  4.8   |  17  |  1.10        Ca     9.3     [09-16-23 @ 06:38]            Creatinine Trend:  SCr 1.10 [09-16 @ 06:38]  SCr 1.07 [09-15 @ 12:49]  SCr 1.23 [09-15 @ 06:52]  SCr 1.31 [09-14 @ 06:53]  SCr 1.28 [09-13 @ 07:28]        Urine Sodium 86      [09-13-23 @ 18:44]  Urine Osmolality 301      [09-13-23 @ 18:44]    PTH -- (Ca 9.7)      [09-08-23 @ 06:57]   35  TSH 3.78      [09-08-23 @ 06:57]         [facility]   [phone]  RENAL FOLLOW UP NOTE  --------------------------------------------------------------------------------  Chief Complaint:    24 hour events/subjective:    seen earlier   denies worsening nausea   does have pelvic area pain     PAST HISTORY  --------------------------------------------------------------------------------  No significant changes to PMH, PSH, FHx, SHx, unless otherwise noted    ALLERGIES & MEDICATIONS  --------------------------------------------------------------------------------  Allergies    penicillin (Unknown)    Intolerances      Standing Inpatient Medications  allopurinol 300 milliGRAM(s) Oral daily  amLODIPine   Tablet 5 milliGRAM(s) Oral daily  ascorbic acid 500 milliGRAM(s) Oral daily  aspirin 325 milliGRAM(s) Oral daily  atorvastatin 40 milliGRAM(s) Oral at bedtime  dextrose 5%. 1000 milliLiter(s) IV Continuous <Continuous>  dextrose 5%. 1000 milliLiter(s) IV Continuous <Continuous>  dextrose 50% Injectable 25 Gram(s) IV Push once  dextrose 50% Injectable 12.5 Gram(s) IV Push once  dextrose 50% Injectable 25 Gram(s) IV Push once  enoxaparin Injectable 30 milliGRAM(s) SubCutaneous every 24 hours  glucagon  Injectable 1 milliGRAM(s) IntraMuscular once  insulin lispro (ADMELOG) corrective regimen sliding scale   SubCutaneous three times a day before meals  insulin lispro (ADMELOG) corrective regimen sliding scale   SubCutaneous at bedtime  levothyroxine 50 MICROGram(s) Oral <User Schedule>  levothyroxine 75 MICROGram(s) Oral <User Schedule>  metoprolol tartrate 25 milliGRAM(s) Oral at bedtime  multivitamin 1 Tablet(s) Oral daily  sodium bicarbonate 650 milliGRAM(s) Oral three times a day    PRN Inpatient Medications  acetaminophen     Tablet .. 650 milliGRAM(s) Oral every 6 hours PRN  dextrose Oral Gel 15 Gram(s) Oral once PRN      REVIEW OF SYSTEMS  --------------------------------------------------------------------------------    Gen: denies  fevers/chills,  CVS: denies chest pain/palpitations  Resp: denies SOB/Cough  GI: Denies N/V/Abd pain  : Denies dysuria/oliguria/hematuria    =]    VITALS/PHYSICAL EXAM  --------------------------------------------------------------------------------  T(C): 37 (09-16-23 @ 21:48), Max: 37 (09-16-23 @ 21:48)  HR: 58 (09-16-23 @ 21:48) (53 - 58)  BP: 134/72 (09-16-23 @ 21:48) (114/72 - 134/72)  RR: 18 (09-16-23 @ 21:48) (18 - 18)  SpO2: 99% (09-16-23 @ 21:48) (97% - 99%)  Wt(kg): --        09-15-23 @ 07:01  -  09-16-23 @ 07:00  --------------------------------------------------------  IN: 480 mL / OUT: 0 mL / NET: 480 mL    09-16-23 @ 07:01  -  09-16-23 @ 22:56  --------------------------------------------------------  IN: 480 mL / OUT: 0 mL / NET: 480 mL      Physical Exam:  	    Gen: Non toxic comfortable appearing   	Pulm: decrease bs  no rales or ronchi or wheezing  	CV: No JVD. RRR, S1S2; no rub  	Abd: +BS, soft, nontender/nondistended  	: No suprapubic tenderness  	UE: Warm, no cyanosis  no clubbing,  no edema;  	LE: Warm, no cyanosis  no clubbing, no edema      LABS/STUDIES  --------------------------------------------------------------------------------              10.9   11.83 >-----------<  328      [09-16-23 @ 06:40]              32.5     124  |  93  |  27  ----------------------------<  136      [09-16-23 @ 06:38]  4.8   |  17  |  1.10        Ca     9.3     [09-16-23 @ 06:38]            Creatinine Trend:  SCr 1.10 [09-16 @ 06:38]  SCr 1.07 [09-15 @ 12:49]  SCr 1.23 [09-15 @ 06:52]  SCr 1.31 [09-14 @ 06:53]  SCr 1.28 [09-13 @ 07:28]        Urine Sodium 86      [09-13-23 @ 18:44]  Urine Osmolality 301      [09-13-23 @ 18:44]    PTH -- (Ca 9.7)      [09-08-23 @ 06:57]   35  TSH 3.78      [09-08-23 @ 06:57]

## 2023-09-16 NOTE — PROGRESS NOTE ADULT - NUTRITIONAL ASSESSMENT
This patient has been assessed with a concern for Malnutrition and has been determined to have a diagnosis/diagnoses of Severe protein-calorie malnutrition.    This patient is being managed with:   Diet Regular-  Consistent Carbohydrate {Evening Snack} (CSTCHOSN)  1000mL Fluid Restriction ([id number])  Entered: Sep 12 2023 12:26PM   This patient has been assessed with a concern for Malnutrition and has been determined to have a diagnosis/diagnoses of Severe protein-calorie malnutrition.    This patient is being managed with:   Diet Regular-  Consistent Carbohydrate {Evening Snack} (CSTCHOSN)  1000mL Fluid Restriction ([id number])  Entered: Sep 12 2023 12:26PM   This patient has been assessed with a concern for Malnutrition and has been determined to have a diagnosis/diagnoses of Severe protein-calorie malnutrition.    This patient is being managed with:   Diet Regular-  Consistent Carbohydrate {Evening Snack} (CSTCHOSN)  1000mL Fluid Restriction ([id number])  Entered: Sep 12 2023 12:26PM

## 2023-09-17 LAB
24R-OH-CALCIDIOL SERPL-MCNC: 19 NG/ML — LOW (ref 30–80)
ANION GAP SERPL CALC-SCNC: 13 MMOL/L — SIGNIFICANT CHANGE UP (ref 5–17)
BUN SERPL-MCNC: 26 MG/DL — HIGH (ref 7–23)
CALCIUM SERPL-MCNC: 9.5 MG/DL — SIGNIFICANT CHANGE UP (ref 8.4–10.5)
CHLORIDE SERPL-SCNC: 95 MMOL/L — LOW (ref 96–108)
CO2 SERPL-SCNC: 18 MMOL/L — LOW (ref 22–31)
CREAT SERPL-MCNC: 1.16 MG/DL — SIGNIFICANT CHANGE UP (ref 0.5–1.3)
EGFR: 49 ML/MIN/1.73M2 — LOW
GLUCOSE BLDC GLUCOMTR-MCNC: 125 MG/DL — HIGH (ref 70–99)
GLUCOSE BLDC GLUCOMTR-MCNC: 133 MG/DL — HIGH (ref 70–99)
GLUCOSE BLDC GLUCOMTR-MCNC: 200 MG/DL — HIGH (ref 70–99)
GLUCOSE BLDC GLUCOMTR-MCNC: 207 MG/DL — HIGH (ref 70–99)
GLUCOSE SERPL-MCNC: 119 MG/DL — HIGH (ref 70–99)
POTASSIUM SERPL-MCNC: 4.9 MMOL/L — SIGNIFICANT CHANGE UP (ref 3.5–5.3)
POTASSIUM SERPL-SCNC: 4.9 MMOL/L — SIGNIFICANT CHANGE UP (ref 3.5–5.3)
SODIUM SERPL-SCNC: 126 MMOL/L — LOW (ref 135–145)

## 2023-09-17 RX ORDER — ERGOCALCIFEROL 1.25 MG/1
50000 CAPSULE ORAL
Refills: 0 | Status: DISCONTINUED | OUTPATIENT
Start: 2023-09-17 | End: 2023-09-21

## 2023-09-17 RX ADMIN — Medication 1 TABLET(S): at 12:31

## 2023-09-17 RX ADMIN — Medication 25 MILLIGRAM(S): at 21:35

## 2023-09-17 RX ADMIN — Medication 650 MILLIGRAM(S): at 21:35

## 2023-09-17 RX ADMIN — AMLODIPINE BESYLATE 5 MILLIGRAM(S): 2.5 TABLET ORAL at 05:58

## 2023-09-17 RX ADMIN — ATORVASTATIN CALCIUM 40 MILLIGRAM(S): 80 TABLET, FILM COATED ORAL at 21:35

## 2023-09-17 RX ADMIN — Medication 2: at 12:31

## 2023-09-17 RX ADMIN — Medication 500 MILLIGRAM(S): at 12:31

## 2023-09-17 RX ADMIN — Medication 650 MILLIGRAM(S): at 13:07

## 2023-09-17 RX ADMIN — Medication 325 MILLIGRAM(S): at 12:30

## 2023-09-17 RX ADMIN — Medication 300 MILLIGRAM(S): at 12:31

## 2023-09-17 RX ADMIN — ENOXAPARIN SODIUM 30 MILLIGRAM(S): 100 INJECTION SUBCUTANEOUS at 05:58

## 2023-09-17 RX ADMIN — Medication 650 MILLIGRAM(S): at 05:58

## 2023-09-17 RX ADMIN — Medication 50 MICROGRAM(S): at 10:21

## 2023-09-17 NOTE — PROGRESS NOTE ADULT - SUBJECTIVE AND OBJECTIVE BOX
[facility]   [phone]  RENAL FOLLOW UP NOTE  --------------------------------------------------------------------------------  Chief Complaint:    24 hour events/subjective:    seen earlier  no nausea   but has pelvic bone pain     PAST HISTORY  --------------------------------------------------------------------------------  No significant changes to PMH, PSH, FHx, SHx, unless otherwise noted    ALLERGIES & MEDICATIONS  --------------------------------------------------------------------------------  Allergies    penicillin (Unknown)    Intolerances      Standing Inpatient Medications  allopurinol 300 milliGRAM(s) Oral daily  amLODIPine   Tablet 5 milliGRAM(s) Oral daily  ascorbic acid 500 milliGRAM(s) Oral daily  aspirin 325 milliGRAM(s) Oral daily  atorvastatin 40 milliGRAM(s) Oral at bedtime  dextrose 5%. 1000 milliLiter(s) IV Continuous <Continuous>  dextrose 5%. 1000 milliLiter(s) IV Continuous <Continuous>  dextrose 50% Injectable 25 Gram(s) IV Push once  dextrose 50% Injectable 12.5 Gram(s) IV Push once  dextrose 50% Injectable 25 Gram(s) IV Push once  enoxaparin Injectable 30 milliGRAM(s) SubCutaneous every 24 hours  ergocalciferol 92876 Unit(s) Oral every week  glucagon  Injectable 1 milliGRAM(s) IntraMuscular once  insulin lispro (ADMELOG) corrective regimen sliding scale   SubCutaneous three times a day before meals  insulin lispro (ADMELOG) corrective regimen sliding scale   SubCutaneous at bedtime  levothyroxine 50 MICROGram(s) Oral <User Schedule>  levothyroxine 75 MICROGram(s) Oral <User Schedule>  metoprolol tartrate 25 milliGRAM(s) Oral at bedtime  multivitamin 1 Tablet(s) Oral daily  sodium bicarbonate 650 milliGRAM(s) Oral three times a day    PRN Inpatient Medications  acetaminophen     Tablet .. 650 milliGRAM(s) Oral every 6 hours PRN  dextrose Oral Gel 15 Gram(s) Oral once PRN      REVIEW OF SYSTEMS  --------------------------------------------------------------------------------    Gen: denies  fevers/chills,  CVS: denies chest pain/palpitations  Resp: denies SOB/Cough  GI: Denies N/V/Abd pain  : Denies dysuria    VITALS/PHYSICAL EXAM  --------------------------------------------------------------------------------  T(C): 36.6 (09-17-23 @ 11:47), Max: 37 (09-16-23 @ 21:48)  HR: 57 (09-17-23 @ 11:47) (57 - 59)  BP: 130/64 (09-17-23 @ 11:47) (124/75 - 134/72)  RR: 18 (09-17-23 @ 11:47) (18 - 18)  SpO2: 100% (09-17-23 @ 11:47) (99% - 100%)  Wt(kg): --        09-16-23 @ 07:01  -  09-17-23 @ 07:00  --------------------------------------------------------  IN: 480 mL / OUT: 0 mL / NET: 480 mL    09-17-23 @ 07:01  -  09-17-23 @ 20:27  --------------------------------------------------------  IN: 720 mL / OUT: 0 mL / NET: 720 mL      Physical Exam:  	      Gen: Non toxic comfortable appearing   	Pulm:   no rales or ronchi or wheezing  	CV: No JVD. RRR, S1S2; no rub  	Abd: +BS, soft, nontender/nondistended  	: No suprapubic tenderness  	UE: Warm, no cyanosis  no clubbing,  no edema;  	LE: Warm, no cyanosis  no clubbing, no edema      LABS/STUDIES  --------------------------------------------------------------------------------              10.9   11.83 >-----------<  328      [09-16-23 @ 06:40]              32.5     126  |  95  |  26  ----------------------------<  119      [09-17-23 @ 07:01]  4.9   |  18  |  1.16        Ca     9.5     [09-17-23 @ 07:01]            Creatinine Trend:  SCr 1.16 [09-17 @ 07:01]  SCr 1.10 [09-16 @ 06:38]  SCr 1.07 [09-15 @ 12:49]  SCr 1.23 [09-15 @ 06:52]  SCr 1.31 [09-14 @ 06:53]      Urine Sodium 86      [09-13-23 @ 18:44]  Urine Osmolality 301      [09-13-23 @ 18:44]    PTH -- (Ca 9.7)      [09-08-23 @ 06:57]   35  Vitamin D (25OH) 19.0      [09-17-23 @ 07:06]  TSH 3.78      [09-08-23 @ 06:57]         [facility]   [phone]  RENAL FOLLOW UP NOTE  --------------------------------------------------------------------------------  Chief Complaint:    24 hour events/subjective:    seen earlier  no nausea   but has pelvic bone pain     PAST HISTORY  --------------------------------------------------------------------------------  No significant changes to PMH, PSH, FHx, SHx, unless otherwise noted    ALLERGIES & MEDICATIONS  --------------------------------------------------------------------------------  Allergies    penicillin (Unknown)    Intolerances      Standing Inpatient Medications  allopurinol 300 milliGRAM(s) Oral daily  amLODIPine   Tablet 5 milliGRAM(s) Oral daily  ascorbic acid 500 milliGRAM(s) Oral daily  aspirin 325 milliGRAM(s) Oral daily  atorvastatin 40 milliGRAM(s) Oral at bedtime  dextrose 5%. 1000 milliLiter(s) IV Continuous <Continuous>  dextrose 5%. 1000 milliLiter(s) IV Continuous <Continuous>  dextrose 50% Injectable 25 Gram(s) IV Push once  dextrose 50% Injectable 12.5 Gram(s) IV Push once  dextrose 50% Injectable 25 Gram(s) IV Push once  enoxaparin Injectable 30 milliGRAM(s) SubCutaneous every 24 hours  ergocalciferol 91341 Unit(s) Oral every week  glucagon  Injectable 1 milliGRAM(s) IntraMuscular once  insulin lispro (ADMELOG) corrective regimen sliding scale   SubCutaneous three times a day before meals  insulin lispro (ADMELOG) corrective regimen sliding scale   SubCutaneous at bedtime  levothyroxine 50 MICROGram(s) Oral <User Schedule>  levothyroxine 75 MICROGram(s) Oral <User Schedule>  metoprolol tartrate 25 milliGRAM(s) Oral at bedtime  multivitamin 1 Tablet(s) Oral daily  sodium bicarbonate 650 milliGRAM(s) Oral three times a day    PRN Inpatient Medications  acetaminophen     Tablet .. 650 milliGRAM(s) Oral every 6 hours PRN  dextrose Oral Gel 15 Gram(s) Oral once PRN      REVIEW OF SYSTEMS  --------------------------------------------------------------------------------    Gen: denies  fevers/chills,  CVS: denies chest pain/palpitations  Resp: denies SOB/Cough  GI: Denies N/V/Abd pain  : Denies dysuria    VITALS/PHYSICAL EXAM  --------------------------------------------------------------------------------  T(C): 36.6 (09-17-23 @ 11:47), Max: 37 (09-16-23 @ 21:48)  HR: 57 (09-17-23 @ 11:47) (57 - 59)  BP: 130/64 (09-17-23 @ 11:47) (124/75 - 134/72)  RR: 18 (09-17-23 @ 11:47) (18 - 18)  SpO2: 100% (09-17-23 @ 11:47) (99% - 100%)  Wt(kg): --        09-16-23 @ 07:01  -  09-17-23 @ 07:00  --------------------------------------------------------  IN: 480 mL / OUT: 0 mL / NET: 480 mL    09-17-23 @ 07:01  -  09-17-23 @ 20:27  --------------------------------------------------------  IN: 720 mL / OUT: 0 mL / NET: 720 mL      Physical Exam:  	      Gen: Non toxic comfortable appearing   	Pulm:   no rales or ronchi or wheezing  	CV: No JVD. RRR, S1S2; no rub  	Abd: +BS, soft, nontender/nondistended  	: No suprapubic tenderness  	UE: Warm, no cyanosis  no clubbing,  no edema;  	LE: Warm, no cyanosis  no clubbing, no edema      LABS/STUDIES  --------------------------------------------------------------------------------              10.9   11.83 >-----------<  328      [09-16-23 @ 06:40]              32.5     126  |  95  |  26  ----------------------------<  119      [09-17-23 @ 07:01]  4.9   |  18  |  1.16        Ca     9.5     [09-17-23 @ 07:01]            Creatinine Trend:  SCr 1.16 [09-17 @ 07:01]  SCr 1.10 [09-16 @ 06:38]  SCr 1.07 [09-15 @ 12:49]  SCr 1.23 [09-15 @ 06:52]  SCr 1.31 [09-14 @ 06:53]      Urine Sodium 86      [09-13-23 @ 18:44]  Urine Osmolality 301      [09-13-23 @ 18:44]    PTH -- (Ca 9.7)      [09-08-23 @ 06:57]   35  Vitamin D (25OH) 19.0      [09-17-23 @ 07:06]  TSH 3.78      [09-08-23 @ 06:57]         [facility]   [phone]  RENAL FOLLOW UP NOTE  --------------------------------------------------------------------------------  Chief Complaint:    24 hour events/subjective:    seen earlier  no nausea   but has pelvic bone pain     PAST HISTORY  --------------------------------------------------------------------------------  No significant changes to PMH, PSH, FHx, SHx, unless otherwise noted    ALLERGIES & MEDICATIONS  --------------------------------------------------------------------------------  Allergies    penicillin (Unknown)    Intolerances      Standing Inpatient Medications  allopurinol 300 milliGRAM(s) Oral daily  amLODIPine   Tablet 5 milliGRAM(s) Oral daily  ascorbic acid 500 milliGRAM(s) Oral daily  aspirin 325 milliGRAM(s) Oral daily  atorvastatin 40 milliGRAM(s) Oral at bedtime  dextrose 5%. 1000 milliLiter(s) IV Continuous <Continuous>  dextrose 5%. 1000 milliLiter(s) IV Continuous <Continuous>  dextrose 50% Injectable 25 Gram(s) IV Push once  dextrose 50% Injectable 12.5 Gram(s) IV Push once  dextrose 50% Injectable 25 Gram(s) IV Push once  enoxaparin Injectable 30 milliGRAM(s) SubCutaneous every 24 hours  ergocalciferol 13529 Unit(s) Oral every week  glucagon  Injectable 1 milliGRAM(s) IntraMuscular once  insulin lispro (ADMELOG) corrective regimen sliding scale   SubCutaneous three times a day before meals  insulin lispro (ADMELOG) corrective regimen sliding scale   SubCutaneous at bedtime  levothyroxine 50 MICROGram(s) Oral <User Schedule>  levothyroxine 75 MICROGram(s) Oral <User Schedule>  metoprolol tartrate 25 milliGRAM(s) Oral at bedtime  multivitamin 1 Tablet(s) Oral daily  sodium bicarbonate 650 milliGRAM(s) Oral three times a day    PRN Inpatient Medications  acetaminophen     Tablet .. 650 milliGRAM(s) Oral every 6 hours PRN  dextrose Oral Gel 15 Gram(s) Oral once PRN      REVIEW OF SYSTEMS  --------------------------------------------------------------------------------    Gen: denies  fevers/chills,  CVS: denies chest pain/palpitations  Resp: denies SOB/Cough  GI: Denies N/V/Abd pain  : Denies dysuria    VITALS/PHYSICAL EXAM  --------------------------------------------------------------------------------  T(C): 36.6 (09-17-23 @ 11:47), Max: 37 (09-16-23 @ 21:48)  HR: 57 (09-17-23 @ 11:47) (57 - 59)  BP: 130/64 (09-17-23 @ 11:47) (124/75 - 134/72)  RR: 18 (09-17-23 @ 11:47) (18 - 18)  SpO2: 100% (09-17-23 @ 11:47) (99% - 100%)  Wt(kg): --        09-16-23 @ 07:01  -  09-17-23 @ 07:00  --------------------------------------------------------  IN: 480 mL / OUT: 0 mL / NET: 480 mL    09-17-23 @ 07:01  -  09-17-23 @ 20:27  --------------------------------------------------------  IN: 720 mL / OUT: 0 mL / NET: 720 mL      Physical Exam:  	      Gen: Non toxic comfortable appearing   	Pulm:   no rales or ronchi or wheezing  	CV: No JVD. RRR, S1S2; no rub  	Abd: +BS, soft, nontender/nondistended  	: No suprapubic tenderness  	UE: Warm, no cyanosis  no clubbing,  no edema;  	LE: Warm, no cyanosis  no clubbing, no edema      LABS/STUDIES  --------------------------------------------------------------------------------              10.9   11.83 >-----------<  328      [09-16-23 @ 06:40]              32.5     126  |  95  |  26  ----------------------------<  119      [09-17-23 @ 07:01]  4.9   |  18  |  1.16        Ca     9.5     [09-17-23 @ 07:01]            Creatinine Trend:  SCr 1.16 [09-17 @ 07:01]  SCr 1.10 [09-16 @ 06:38]  SCr 1.07 [09-15 @ 12:49]  SCr 1.23 [09-15 @ 06:52]  SCr 1.31 [09-14 @ 06:53]      Urine Sodium 86      [09-13-23 @ 18:44]  Urine Osmolality 301      [09-13-23 @ 18:44]    PTH -- (Ca 9.7)      [09-08-23 @ 06:57]   35  Vitamin D (25OH) 19.0      [09-17-23 @ 07:06]  TSH 3.78      [09-08-23 @ 06:57]

## 2023-09-17 NOTE — PROGRESS NOTE ADULT - SUBJECTIVE AND OBJECTIVE BOX
Patient is a 73y old  Female who presents with a chief complaint of Pt is a 73-year-old female with history of HTN, CAD and unclear open heart surgery (per patient may be CABG, on  daily), DM, hypothyroid, and recent atraumatic right pelvic fracture (unclear exact fracture per patient but seems to indicate pubic rami) who presents with worsening right lower back pain     (10 Sep 2023 10:59)      SUBJECTIVE / OVERNIGHT EVENTS: No new complaints.   Review of Systems  chest pain no  palpitations no  sob no  nausea no  headache no    MEDICATIONS  (STANDING):  allopurinol 300 milliGRAM(s) Oral daily  amLODIPine   Tablet 5 milliGRAM(s) Oral daily  ascorbic acid 500 milliGRAM(s) Oral daily  aspirin 325 milliGRAM(s) Oral daily  atorvastatin 40 milliGRAM(s) Oral at bedtime  dextrose 5%. 1000 milliLiter(s) (50 mL/Hr) IV Continuous <Continuous>  dextrose 5%. 1000 milliLiter(s) (100 mL/Hr) IV Continuous <Continuous>  dextrose 50% Injectable 25 Gram(s) IV Push once  dextrose 50% Injectable 12.5 Gram(s) IV Push once  dextrose 50% Injectable 25 Gram(s) IV Push once  enoxaparin Injectable 30 milliGRAM(s) SubCutaneous every 24 hours  glucagon  Injectable 1 milliGRAM(s) IntraMuscular once  insulin lispro (ADMELOG) corrective regimen sliding scale   SubCutaneous three times a day before meals  insulin lispro (ADMELOG) corrective regimen sliding scale   SubCutaneous at bedtime  levothyroxine 75 MICROGram(s) Oral <User Schedule>  levothyroxine 50 MICROGram(s) Oral <User Schedule>  metoprolol tartrate 25 milliGRAM(s) Oral at bedtime  multivitamin 1 Tablet(s) Oral daily  sodium bicarbonate 650 milliGRAM(s) Oral three times a day    MEDICATIONS  (PRN):  acetaminophen     Tablet .. 650 milliGRAM(s) Oral every 6 hours PRN Temp greater or equal to 38.5C (101.3F), Mild Pain (1 - 3)  dextrose Oral Gel 15 Gram(s) Oral once PRN Blood Glucose LESS THAN 70 milliGRAM(s)/deciliter      Vital Signs Last 24 Hrs  T(C): 36.6 (17 Sep 2023 11:47), Max: 37 (16 Sep 2023 21:48)  T(F): 97.9 (17 Sep 2023 11:47), Max: 98.6 (16 Sep 2023 21:48)  HR: 57 (17 Sep 2023 11:47) (57 - 59)  BP: 130/64 (17 Sep 2023 11:47) (124/75 - 134/72)  BP(mean): --  RR: 18 (17 Sep 2023 11:47) (18 - 18)  SpO2: 100% (17 Sep 2023 11:47) (99% - 100%)    Parameters below as of 17 Sep 2023 11:47  Patient On (Oxygen Delivery Method): room air        PHYSICAL EXAM:  GENERAL: NAD  HEAD:  Atraumatic, Normocephalic  EYES: EOMI, PERRLA, conjunctiva and sclera clear  NECK: Supple, No JVD  CHEST/LUNG: Clear to auscultation bilaterally; No wheeze  HEART: Regular rate and rhythm; No murmurs, rubs, or gallops  ABDOMEN: Soft, Nontender, Nondistended; Bowel sounds present  EXTREMITIES:  2+ Peripheral Pulses, No clubbing, cyanosis, or edema  PSYCH: AAOx3  NEUROLOGY: non-focal  SKIN: No rashes or lesions    LABS:                        10.9   11.83 )-----------( 328      ( 16 Sep 2023 06:40 )             32.5     09-17    126<L>  |  95<L>  |  26<H>  ----------------------------<  119<H>  4.9   |  18<L>  |  1.16    Ca    9.5      17 Sep 2023 07:01            Urinalysis Basic - ( 17 Sep 2023 07:01 )    Color: x / Appearance: x / SG: x / pH: x  Gluc: 119 mg/dL / Ketone: x  / Bili: x / Urobili: x   Blood: x / Protein: x / Nitrite: x   Leuk Esterase: x / RBC: x / WBC x   Sq Epi: x / Non Sq Epi: x / Bacteria: x          RADIOLOGY & ADDITIONAL TESTS:    Imaging Personally Reviewed:    Consultant(s) Notes Reviewed:      Care Discussed with Consultants/Other Providers:

## 2023-09-17 NOTE — PROGRESS NOTE ADULT - ASSESSMENT
73-year-old female with history of HTN, CAD and unclear open heart surgery (per patient may be CABG, on  daily), DM, hypothyroid, and recent atraumatic right pelvic fracture  who presents with worsening right lower back pain      1- hyponatremia  2- ckd III   3- acidosis   4- HTN   5- hx chf      creatinine is better   sodium slowly improving  can start lasix with sodium bicarb tabs   1L po fluid restriction  acidosis is worsening  beta hydroxy butyrate -> 0.2  VBG with lytes ph and lactate in range   sodium bicarb tabs 650 mg TID  na is improving trend bicarb   pain control  cont norvasc 5 mg daily  trend bmp in am

## 2023-09-17 NOTE — PROGRESS NOTE ADULT - ASSESSMENT
73 f with    Back pain/ Sacral fracture  - pain control  - MRI LS spine noted  - CT pelvis noted  - PT  - Neurosurgery evaluation noted. No intervention     Hypercalcemia  - follow    Vit D deficiency  - supplement    CAD (coronary artery disease)   - stable  - Echo  - Cardiology evaluation dr Diego     Diabetes mellitus   - ADA diet  - BS control    H/O pericardiotomy   - Echo    History of gout   - continue Rx  - Uric acid    Hyperlipidemia   - stable    Hypertension   - control    Hypothyroidism   - supplement  - follow TSH    JONATHAN  - follow Cr, lytes  - Hold Lasix and Aldactone.    Hyponatremia  - Nephrology follow Dr. Varela    DVT prophylaxis     DCP in progress when Nephrology cleared.     Guido Rolon MD phone [phone]      73 f with    Back pain/ Sacral fracture  - pain control  - MRI LS spine noted  - CT pelvis noted  - PT  - Neurosurgery evaluation noted. No intervention     Hypercalcemia  - follow    Vit D deficiency  - supplement    CAD (coronary artery disease)   - stable  - Echo  - Cardiology evaluation dr Diego     Diabetes mellitus   - ADA diet  - BS control    H/O pericardiotomy   - Echo    History of gout   - continue Rx  - Uric acid    Hyperlipidemia   - stable    Hypertension   - control    Hypothyroidism   - supplement  - follow TSH    JONATHAN  - follow Cr, lytes  - Hold Lasix and Aldactone.    Hyponatremia  - Nephrology follow Dr. Varela    DVT prophylaxis     DCP in progress when Nephrology cleared.     Guido Rolon MD phone [phone]      73 f with    Back pain/ Sacral fracture  - pain control  - MRI LS spine noted  - CT pelvis noted  - PT  - Neurosurgery evaluation noted. No intervention     Hypercalcemia  - follow    Vit D deficiency  - supplement    CAD (coronary artery disease)   - stable  - Echo  - Cardiology evaluation dr Diego     Diabetes mellitus   - ADA diet  - BS control    H/O pericardiotomy   - Echo    History of gout   - continue Rx  - Uric acid    Hyperlipidemia   - stable    Hypertension   - control    Hypothyroidism   - supplement  - follow TSH    JONATHAN  - follow Cr, lytes  - Hold Lasix and Aldactone.    Hyponatremia  - Nephrology follow Dr. Varela    DVT prophylaxis     DCP in progress when Nephrology cleared.     Guido Rolon MD phone [phone]

## 2023-09-18 LAB
ANION GAP SERPL CALC-SCNC: 15 MMOL/L — SIGNIFICANT CHANGE UP (ref 5–17)
BUN SERPL-MCNC: 22 MG/DL — SIGNIFICANT CHANGE UP (ref 7–23)
CALCIUM SERPL-MCNC: 9.4 MG/DL — SIGNIFICANT CHANGE UP (ref 8.4–10.5)
CHLORIDE SERPL-SCNC: 95 MMOL/L — LOW (ref 96–108)
CO2 SERPL-SCNC: 18 MMOL/L — LOW (ref 22–31)
CREAT SERPL-MCNC: 1.13 MG/DL — SIGNIFICANT CHANGE UP (ref 0.5–1.3)
EGFR: 51 ML/MIN/1.73M2 — LOW
GLUCOSE BLDC GLUCOMTR-MCNC: 142 MG/DL — HIGH (ref 70–99)
GLUCOSE BLDC GLUCOMTR-MCNC: 144 MG/DL — HIGH (ref 70–99)
GLUCOSE BLDC GLUCOMTR-MCNC: 184 MG/DL — HIGH (ref 70–99)
GLUCOSE BLDC GLUCOMTR-MCNC: 200 MG/DL — HIGH (ref 70–99)
GLUCOSE BLDC GLUCOMTR-MCNC: 270 MG/DL — HIGH (ref 70–99)
GLUCOSE SERPL-MCNC: 125 MG/DL — HIGH (ref 70–99)
HCT VFR BLD CALC: 32.9 % — LOW (ref 34.5–45)
HGB BLD-MCNC: 10.7 G/DL — LOW (ref 11.5–15.5)
MCHC RBC-ENTMCNC: 30.6 PG — SIGNIFICANT CHANGE UP (ref 27–34)
MCHC RBC-ENTMCNC: 32.5 GM/DL — SIGNIFICANT CHANGE UP (ref 32–36)
MCV RBC AUTO: 94 FL — SIGNIFICANT CHANGE UP (ref 80–100)
NRBC # BLD: 0 /100 WBCS — SIGNIFICANT CHANGE UP (ref 0–0)
PLATELET # BLD AUTO: 321 K/UL — SIGNIFICANT CHANGE UP (ref 150–400)
POTASSIUM SERPL-MCNC: 4.8 MMOL/L — SIGNIFICANT CHANGE UP (ref 3.5–5.3)
POTASSIUM SERPL-SCNC: 4.8 MMOL/L — SIGNIFICANT CHANGE UP (ref 3.5–5.3)
RBC # BLD: 3.5 M/UL — LOW (ref 3.8–5.2)
RBC # FLD: 14.6 % — HIGH (ref 10.3–14.5)
SARS-COV-2 RNA SPEC QL NAA+PROBE: SIGNIFICANT CHANGE UP
SODIUM SERPL-SCNC: 128 MMOL/L — LOW (ref 135–145)
VIT D25+D1,25 OH+D1,25 PNL SERPL-MCNC: 11.3 PG/ML — LOW (ref 19.9–79.3)
WBC # BLD: 11.14 K/UL — HIGH (ref 3.8–10.5)
WBC # FLD AUTO: 11.14 K/UL — HIGH (ref 3.8–10.5)

## 2023-09-18 RX ORDER — FUROSEMIDE 40 MG
20 TABLET ORAL DAILY
Refills: 0 | Status: DISCONTINUED | OUTPATIENT
Start: 2023-09-18 | End: 2023-09-19

## 2023-09-18 RX ORDER — FUROSEMIDE 40 MG
40 TABLET ORAL DAILY
Refills: 0 | Status: DISCONTINUED | OUTPATIENT
Start: 2023-09-18 | End: 2023-09-18

## 2023-09-18 RX ADMIN — Medication 2: at 13:30

## 2023-09-18 RX ADMIN — Medication 75 MICROGRAM(S): at 08:29

## 2023-09-18 RX ADMIN — Medication 500 MILLIGRAM(S): at 11:09

## 2023-09-18 RX ADMIN — AMLODIPINE BESYLATE 5 MILLIGRAM(S): 2.5 TABLET ORAL at 05:21

## 2023-09-18 RX ADMIN — Medication 650 MILLIGRAM(S): at 13:29

## 2023-09-18 RX ADMIN — Medication 40 MILLIGRAM(S): at 11:57

## 2023-09-18 RX ADMIN — Medication 25 MILLIGRAM(S): at 22:23

## 2023-09-18 RX ADMIN — ERGOCALCIFEROL 50000 UNIT(S): 1.25 CAPSULE ORAL at 11:08

## 2023-09-18 RX ADMIN — Medication 650 MILLIGRAM(S): at 22:22

## 2023-09-18 RX ADMIN — Medication 300 MILLIGRAM(S): at 11:08

## 2023-09-18 RX ADMIN — Medication 1 TABLET(S): at 11:08

## 2023-09-18 RX ADMIN — ATORVASTATIN CALCIUM 40 MILLIGRAM(S): 80 TABLET, FILM COATED ORAL at 22:22

## 2023-09-18 RX ADMIN — Medication 325 MILLIGRAM(S): at 11:08

## 2023-09-18 RX ADMIN — ENOXAPARIN SODIUM 30 MILLIGRAM(S): 100 INJECTION SUBCUTANEOUS at 05:21

## 2023-09-18 RX ADMIN — Medication 650 MILLIGRAM(S): at 05:21

## 2023-09-18 NOTE — PROGRESS NOTE ADULT - ASSESSMENT
73 f with    Back pain/ Sacral fracture  - pain control  - MRI LS spine noted  - CT pelvis noted  - PT  - Neurosurgery evaluation noted. No intervention     Hypercalcemia  - follow    Vit D deficiency  - supplement    CAD (coronary artery disease)   - stable  - Cardiology follow dr Diego     Diabetes mellitus   - ADA diet  - BS control    H/O pericardiotomy   - Echo    History of gout   - continue Rx  - Uric acid    Hyperlipidemia   - stable    Hypertension   - control    Hypothyroidism   - supplement  - follow TSH    JONATHAN improving   - follow Cr, lytes  - restart Lasix 20 mg.     Hyponatremia  - Nephrology follow Dr. Varela    DVT prophylaxis     DCP in progress when Nephrology cleared.     Guido Rolon MD phone [phone]      73 f with    Back pain/ Sacral fracture  - pain control  - MRI LS spine noted  - CT pelvis noted  - PT  - Neurosurgery evaluation noted. No intervention     Hypercalcemia  - follow    Vit D deficiency  - supplement    CAD (coronary artery disease)   - stable  - Cardiology follow dr Diego     Diabetes mellitus   - ADA diet  - BS control    H/O pericardiotomy   - Echo    History of gout   - continue Rx  - Uric acid    Hyperlipidemia   - stable    Hypertension   - control    Hypothyroidism   - supplement  - follow TSH    JONATHAN improving   - follow Cr, lytes  - restart Lasix 20 mg.     Hyponatremia  - Nephrology follow Dr. Varela    DVT prophylaxis     DCP in progress when Nephrology cleared.     Guido Rolon MD phone [phone]      73 f with    Back pain/ Sacral fracture  - pain control  - MRI LS spine noted  - CT pelvis noted  - PT  - Neurosurgery evaluation noted. No intervention     Hypercalcemia  - follow    Vit D deficiency  - supplement    CAD (coronary artery disease)   - stable  - Cardiology follow dr Diego     Diabetes mellitus   - ADA diet  - BS control    H/O pericardiotomy   - Echo    History of gout   - continue Rx  - Uric acid    Hyperlipidemia   - stable    Hypertension   - control    Hypothyroidism   - supplement  - follow TSH    JONATHAN improving   - follow Cr, lytes  - restart Lasix 20 mg.     Hyponatremia  - Nephrology follow Dr. Varela    DVT prophylaxis     DCP in progress when Nephrology cleared.     Guido Rolon MD phone [phone]

## 2023-09-18 NOTE — PROGRESS NOTE ADULT - ASSESSMENT
73-year-old female with history of HTN, CAD and unclear open heart surgery (per patient may be CABG, on  daily), DM, hypothyroid, and recent atraumatic right pelvic fracture  who presents with worsening right lower back pain      1- hyponatremia  2- ckd III   3- acidosis   4- HTN   5- hx chf      creatinine is better   sodium slowly improving  1L po fluid restriction  acidosis, trend bicarb  beta hydroxy butyrate -> 0.2  VBG with lytes ph and lactate in range  sodium bicarb tabs 650 mg TID  add lasix 20 mg daily  pain control  cont norvasc 5 mg daily  trend bmp in am

## 2023-09-18 NOTE — PROGRESS NOTE ADULT - SUBJECTIVE AND OBJECTIVE BOX
[facility]   [phone]  RENAL FOLLOW UP NOTE  --------------------------------------------------------------------------------  Chief Complaint:    24 hour events/subjective:    patient seen and examined.   denies sob  +pain  denies nausea     PAST HISTORY  --------------------------------------------------------------------------------  No significant changes to PMH, PSH, FHx, SHx, unless otherwise noted    ALLERGIES & MEDICATIONS  --------------------------------------------------------------------------------  Allergies    penicillin (Unknown)    Intolerances      Standing Inpatient Medications  allopurinol 300 milliGRAM(s) Oral daily  amLODIPine   Tablet 5 milliGRAM(s) Oral daily  ascorbic acid 500 milliGRAM(s) Oral daily  aspirin 325 milliGRAM(s) Oral daily  atorvastatin 40 milliGRAM(s) Oral at bedtime  dextrose 5%. 1000 milliLiter(s) IV Continuous <Continuous>  dextrose 5%. 1000 milliLiter(s) IV Continuous <Continuous>  dextrose 50% Injectable 25 Gram(s) IV Push once  dextrose 50% Injectable 12.5 Gram(s) IV Push once  dextrose 50% Injectable 25 Gram(s) IV Push once  enoxaparin Injectable 30 milliGRAM(s) SubCutaneous every 24 hours  ergocalciferol 41399 Unit(s) Oral every week  furosemide    Tablet 20 milliGRAM(s) Oral daily  glucagon  Injectable 1 milliGRAM(s) IntraMuscular once  insulin lispro (ADMELOG) corrective regimen sliding scale   SubCutaneous three times a day before meals  insulin lispro (ADMELOG) corrective regimen sliding scale   SubCutaneous at bedtime  levothyroxine 50 MICROGram(s) Oral <User Schedule>  levothyroxine 75 MICROGram(s) Oral <User Schedule>  metoprolol tartrate 25 milliGRAM(s) Oral at bedtime  multivitamin 1 Tablet(s) Oral daily  sodium bicarbonate 650 milliGRAM(s) Oral three times a day    PRN Inpatient Medications  acetaminophen     Tablet .. 650 milliGRAM(s) Oral every 6 hours PRN  dextrose Oral Gel 15 Gram(s) Oral once PRN      REVIEW OF SYSTEMS  --------------------------------------------------------------------------------    Gen: denies fevers/chills,  CVS: denies chest pain/palpitations  Resp: denies SOB/Cough  GI: Denies N/V/Abd pain  : Denies dysuria/oliguria/hematuria    VITALS/PHYSICAL EXAM  --------------------------------------------------------------------------------  T(C): 36.5 (09-18-23 @ 12:36), Max: 36.9 (09-17-23 @ 21:10)  HR: 67 (09-18-23 @ 12:36) (57 - 67)  BP: 139/61 (09-18-23 @ 12:36) (129/59 - 145/68)  RR: 18 (09-18-23 @ 12:36) (18 - 18)  SpO2: 97% (09-18-23 @ 12:36) (97% - 100%)  Wt(kg): --        09-17-23 @ 07:01  -  09-18-23 @ 07:00  --------------------------------------------------------  IN: 720 mL / OUT: 0 mL / NET: 720 mL    09-18-23 @ 07:01  -  09-18-23 @ 15:01  --------------------------------------------------------  IN: 240 mL / OUT: 0 mL / NET: 240 mL      Physical Exam:  	              Gen: Non toxic comfortable appearing   	Pulm:  no rales or ronchi or wheezing  	CV: No JVD. RRR, S1S2; no rub  	Abd: +BS, soft, nontender/nondistended  	: No suprapubic tenderness  	UE: Warm, no cyanosis  no clubbing,  no edema;  	LE: Warm, no cyanosis  no clubbing, no edema    LABS/STUDIES  --------------------------------------------------------------------------------              10.7   11.14 >-----------<  321      [09-18-23 @ 07:02]              32.9     128  |  95  |  22  ----------------------------<  125      [09-18-23 @ 07:00]  4.8   |  18  |  1.13        Ca     9.4     [09-18-23 @ 07:00]            Creatinine Trend:  SCr 1.13 [09-18 @ 07:00]  SCr 1.16 [09-17 @ 07:01]  SCr 1.10 [09-16 @ 06:38]  SCr 1.07 [09-15 @ 12:49]  SCr 1.23 [09-15 @ 06:52]              Urine Sodium 86      [09-13-23 @ 18:44]  Urine Osmolality 301      [09-13-23 @ 18:44]    PTH -- (Ca 9.7)      [09-08-23 @ 06:57]   35  Vitamin D (25OH) 19.0      [09-17-23 @ 07:06]  TSH 3.78      [09-08-23 @ 06:57]       [facility]   [phone]  RENAL FOLLOW UP NOTE  --------------------------------------------------------------------------------  Chief Complaint:    24 hour events/subjective:    patient seen and examined.   denies sob  +pain  denies nausea     PAST HISTORY  --------------------------------------------------------------------------------  No significant changes to PMH, PSH, FHx, SHx, unless otherwise noted    ALLERGIES & MEDICATIONS  --------------------------------------------------------------------------------  Allergies    penicillin (Unknown)    Intolerances      Standing Inpatient Medications  allopurinol 300 milliGRAM(s) Oral daily  amLODIPine   Tablet 5 milliGRAM(s) Oral daily  ascorbic acid 500 milliGRAM(s) Oral daily  aspirin 325 milliGRAM(s) Oral daily  atorvastatin 40 milliGRAM(s) Oral at bedtime  dextrose 5%. 1000 milliLiter(s) IV Continuous <Continuous>  dextrose 5%. 1000 milliLiter(s) IV Continuous <Continuous>  dextrose 50% Injectable 25 Gram(s) IV Push once  dextrose 50% Injectable 12.5 Gram(s) IV Push once  dextrose 50% Injectable 25 Gram(s) IV Push once  enoxaparin Injectable 30 milliGRAM(s) SubCutaneous every 24 hours  ergocalciferol 22784 Unit(s) Oral every week  furosemide    Tablet 20 milliGRAM(s) Oral daily  glucagon  Injectable 1 milliGRAM(s) IntraMuscular once  insulin lispro (ADMELOG) corrective regimen sliding scale   SubCutaneous three times a day before meals  insulin lispro (ADMELOG) corrective regimen sliding scale   SubCutaneous at bedtime  levothyroxine 50 MICROGram(s) Oral <User Schedule>  levothyroxine 75 MICROGram(s) Oral <User Schedule>  metoprolol tartrate 25 milliGRAM(s) Oral at bedtime  multivitamin 1 Tablet(s) Oral daily  sodium bicarbonate 650 milliGRAM(s) Oral three times a day    PRN Inpatient Medications  acetaminophen     Tablet .. 650 milliGRAM(s) Oral every 6 hours PRN  dextrose Oral Gel 15 Gram(s) Oral once PRN      REVIEW OF SYSTEMS  --------------------------------------------------------------------------------    Gen: denies fevers/chills,  CVS: denies chest pain/palpitations  Resp: denies SOB/Cough  GI: Denies N/V/Abd pain  : Denies dysuria/oliguria/hematuria    VITALS/PHYSICAL EXAM  --------------------------------------------------------------------------------  T(C): 36.5 (09-18-23 @ 12:36), Max: 36.9 (09-17-23 @ 21:10)  HR: 67 (09-18-23 @ 12:36) (57 - 67)  BP: 139/61 (09-18-23 @ 12:36) (129/59 - 145/68)  RR: 18 (09-18-23 @ 12:36) (18 - 18)  SpO2: 97% (09-18-23 @ 12:36) (97% - 100%)  Wt(kg): --        09-17-23 @ 07:01  -  09-18-23 @ 07:00  --------------------------------------------------------  IN: 720 mL / OUT: 0 mL / NET: 720 mL    09-18-23 @ 07:01  -  09-18-23 @ 15:01  --------------------------------------------------------  IN: 240 mL / OUT: 0 mL / NET: 240 mL      Physical Exam:  	              Gen: Non toxic comfortable appearing   	Pulm:  no rales or ronchi or wheezing  	CV: No JVD. RRR, S1S2; no rub  	Abd: +BS, soft, nontender/nondistended  	: No suprapubic tenderness  	UE: Warm, no cyanosis  no clubbing,  no edema;  	LE: Warm, no cyanosis  no clubbing, no edema    LABS/STUDIES  --------------------------------------------------------------------------------              10.7   11.14 >-----------<  321      [09-18-23 @ 07:02]              32.9     128  |  95  |  22  ----------------------------<  125      [09-18-23 @ 07:00]  4.8   |  18  |  1.13        Ca     9.4     [09-18-23 @ 07:00]            Creatinine Trend:  SCr 1.13 [09-18 @ 07:00]  SCr 1.16 [09-17 @ 07:01]  SCr 1.10 [09-16 @ 06:38]  SCr 1.07 [09-15 @ 12:49]  SCr 1.23 [09-15 @ 06:52]              Urine Sodium 86      [09-13-23 @ 18:44]  Urine Osmolality 301      [09-13-23 @ 18:44]    PTH -- (Ca 9.7)      [09-08-23 @ 06:57]   35  Vitamin D (25OH) 19.0      [09-17-23 @ 07:06]  TSH 3.78      [09-08-23 @ 06:57]       [facility]   [phone]  RENAL FOLLOW UP NOTE  --------------------------------------------------------------------------------  Chief Complaint:    24 hour events/subjective:    patient seen and examined.   denies sob  +pain  denies nausea     PAST HISTORY  --------------------------------------------------------------------------------  No significant changes to PMH, PSH, FHx, SHx, unless otherwise noted    ALLERGIES & MEDICATIONS  --------------------------------------------------------------------------------  Allergies    penicillin (Unknown)    Intolerances      Standing Inpatient Medications  allopurinol 300 milliGRAM(s) Oral daily  amLODIPine   Tablet 5 milliGRAM(s) Oral daily  ascorbic acid 500 milliGRAM(s) Oral daily  aspirin 325 milliGRAM(s) Oral daily  atorvastatin 40 milliGRAM(s) Oral at bedtime  dextrose 5%. 1000 milliLiter(s) IV Continuous <Continuous>  dextrose 5%. 1000 milliLiter(s) IV Continuous <Continuous>  dextrose 50% Injectable 25 Gram(s) IV Push once  dextrose 50% Injectable 12.5 Gram(s) IV Push once  dextrose 50% Injectable 25 Gram(s) IV Push once  enoxaparin Injectable 30 milliGRAM(s) SubCutaneous every 24 hours  ergocalciferol 01373 Unit(s) Oral every week  furosemide    Tablet 20 milliGRAM(s) Oral daily  glucagon  Injectable 1 milliGRAM(s) IntraMuscular once  insulin lispro (ADMELOG) corrective regimen sliding scale   SubCutaneous three times a day before meals  insulin lispro (ADMELOG) corrective regimen sliding scale   SubCutaneous at bedtime  levothyroxine 50 MICROGram(s) Oral <User Schedule>  levothyroxine 75 MICROGram(s) Oral <User Schedule>  metoprolol tartrate 25 milliGRAM(s) Oral at bedtime  multivitamin 1 Tablet(s) Oral daily  sodium bicarbonate 650 milliGRAM(s) Oral three times a day    PRN Inpatient Medications  acetaminophen     Tablet .. 650 milliGRAM(s) Oral every 6 hours PRN  dextrose Oral Gel 15 Gram(s) Oral once PRN      REVIEW OF SYSTEMS  --------------------------------------------------------------------------------    Gen: denies fevers/chills,  CVS: denies chest pain/palpitations  Resp: denies SOB/Cough  GI: Denies N/V/Abd pain  : Denies dysuria/oliguria/hematuria    VITALS/PHYSICAL EXAM  --------------------------------------------------------------------------------  T(C): 36.5 (09-18-23 @ 12:36), Max: 36.9 (09-17-23 @ 21:10)  HR: 67 (09-18-23 @ 12:36) (57 - 67)  BP: 139/61 (09-18-23 @ 12:36) (129/59 - 145/68)  RR: 18 (09-18-23 @ 12:36) (18 - 18)  SpO2: 97% (09-18-23 @ 12:36) (97% - 100%)  Wt(kg): --        09-17-23 @ 07:01  -  09-18-23 @ 07:00  --------------------------------------------------------  IN: 720 mL / OUT: 0 mL / NET: 720 mL    09-18-23 @ 07:01  -  09-18-23 @ 15:01  --------------------------------------------------------  IN: 240 mL / OUT: 0 mL / NET: 240 mL      Physical Exam:  	              Gen: Non toxic comfortable appearing   	Pulm:  no rales or ronchi or wheezing  	CV: No JVD. RRR, S1S2; no rub  	Abd: +BS, soft, nontender/nondistended  	: No suprapubic tenderness  	UE: Warm, no cyanosis  no clubbing,  no edema;  	LE: Warm, no cyanosis  no clubbing, no edema    LABS/STUDIES  --------------------------------------------------------------------------------              10.7   11.14 >-----------<  321      [09-18-23 @ 07:02]              32.9     128  |  95  |  22  ----------------------------<  125      [09-18-23 @ 07:00]  4.8   |  18  |  1.13        Ca     9.4     [09-18-23 @ 07:00]            Creatinine Trend:  SCr 1.13 [09-18 @ 07:00]  SCr 1.16 [09-17 @ 07:01]  SCr 1.10 [09-16 @ 06:38]  SCr 1.07 [09-15 @ 12:49]  SCr 1.23 [09-15 @ 06:52]              Urine Sodium 86      [09-13-23 @ 18:44]  Urine Osmolality 301      [09-13-23 @ 18:44]    PTH -- (Ca 9.7)      [09-08-23 @ 06:57]   35  Vitamin D (25OH) 19.0      [09-17-23 @ 07:06]  TSH 3.78      [09-08-23 @ 06:57]

## 2023-09-18 NOTE — PROGRESS NOTE ADULT - SUBJECTIVE AND OBJECTIVE BOX
Cardiovascular Disease Progress Note    Overnight events: No acute events overnight.  no new cardiac sx  Otherwise review of systems negative    Objective Findings:  T(C): 36.6 (09-18-23 @ 05:06), Max: 36.9 (09-17-23 @ 21:10)  HR: 57 (09-18-23 @ 05:06) (57 - 65)  BP: 145/68 (09-18-23 @ 05:06) (129/59 - 145/68)  RR: 18 (09-18-23 @ 05:06) (18 - 18)  SpO2: 99% (09-18-23 @ 05:06) (99% - 100%)  Wt(kg): --  Daily     Daily       Physical Exam:  Gen: NAD  HEENT: EOMI  CV: RRR, normal S1 + S2, no m/r/g  Lungs: CTAB  Abd: soft, non-tender  Ext: No edema    Telemetry:    Laboratory Data:                        10.7   11.14 )-----------( 321      ( 18 Sep 2023 07:02 )             32.9     09-18    128<L>  |  95<L>  |  22  ----------------------------<  125<H>  4.8   |  18<L>  |  1.13    Ca    9.4      18 Sep 2023 07:00                Inpatient Medications:  MEDICATIONS  (STANDING):  allopurinol 300 milliGRAM(s) Oral daily  amLODIPine   Tablet 5 milliGRAM(s) Oral daily  ascorbic acid 500 milliGRAM(s) Oral daily  aspirin 325 milliGRAM(s) Oral daily  atorvastatin 40 milliGRAM(s) Oral at bedtime  dextrose 5%. 1000 milliLiter(s) (50 mL/Hr) IV Continuous <Continuous>  dextrose 5%. 1000 milliLiter(s) (100 mL/Hr) IV Continuous <Continuous>  dextrose 50% Injectable 25 Gram(s) IV Push once  dextrose 50% Injectable 12.5 Gram(s) IV Push once  dextrose 50% Injectable 25 Gram(s) IV Push once  enoxaparin Injectable 30 milliGRAM(s) SubCutaneous every 24 hours  ergocalciferol 97701 Unit(s) Oral every week  glucagon  Injectable 1 milliGRAM(s) IntraMuscular once  insulin lispro (ADMELOG) corrective regimen sliding scale   SubCutaneous three times a day before meals  insulin lispro (ADMELOG) corrective regimen sliding scale   SubCutaneous at bedtime  levothyroxine 75 MICROGram(s) Oral <User Schedule>  levothyroxine 50 MICROGram(s) Oral <User Schedule>  metoprolol tartrate 25 milliGRAM(s) Oral at bedtime  multivitamin 1 Tablet(s) Oral daily  sodium bicarbonate 650 milliGRAM(s) Oral three times a day      Assessment:  73-year-old female with history of HTN, CAD, carotid artery disease, DM, hypothyroid, and recent atraumatic right pelvic fracture (unclear exact fracture per patient but seems to indicate pubic rami) who presents with worsening right lower back pain which began approximately 4 to 5 days ago.  Patient states pain was mild initially at onset has been constant worsening since onset and denies any trauma or specific eliciting event.  No fever.  No numbness or weakness in extremities.  No loss of control of bowels or bladder.  No urinary symptoms no diarrhea.  No abdominal pain.  No nausea or vomiting. Further history from patient had right pelvic fracture which is likely his pubic rami though have no imaging in EMR and unclear history per patient.  Per her history no trauma or falls.    Recs:  cardiac stable  no e/o acs or chf  cw asa and cont with statin give hx of cad/pad  cw antihypertensives   diuretics per renal  pain control  ortho/nsgy eval - conservative management  s/p mri spine, results noted  dcp       Over 25 minutes spent on total encounter; more than 50% of the visit was spent counseling and/or coordinating care by the attending physician.      Carlos Diego MD   Cardiovascular Disease  [phone] Cardiovascular Disease Progress Note    Overnight events: No acute events overnight.  no new cardiac sx  Otherwise review of systems negative    Objective Findings:  T(C): 36.6 (09-18-23 @ 05:06), Max: 36.9 (09-17-23 @ 21:10)  HR: 57 (09-18-23 @ 05:06) (57 - 65)  BP: 145/68 (09-18-23 @ 05:06) (129/59 - 145/68)  RR: 18 (09-18-23 @ 05:06) (18 - 18)  SpO2: 99% (09-18-23 @ 05:06) (99% - 100%)  Wt(kg): --  Daily     Daily       Physical Exam:  Gen: NAD  HEENT: EOMI  CV: RRR, normal S1 + S2, no m/r/g  Lungs: CTAB  Abd: soft, non-tender  Ext: No edema    Telemetry:    Laboratory Data:                        10.7   11.14 )-----------( 321      ( 18 Sep 2023 07:02 )             32.9     09-18    128<L>  |  95<L>  |  22  ----------------------------<  125<H>  4.8   |  18<L>  |  1.13    Ca    9.4      18 Sep 2023 07:00                Inpatient Medications:  MEDICATIONS  (STANDING):  allopurinol 300 milliGRAM(s) Oral daily  amLODIPine   Tablet 5 milliGRAM(s) Oral daily  ascorbic acid 500 milliGRAM(s) Oral daily  aspirin 325 milliGRAM(s) Oral daily  atorvastatin 40 milliGRAM(s) Oral at bedtime  dextrose 5%. 1000 milliLiter(s) (50 mL/Hr) IV Continuous <Continuous>  dextrose 5%. 1000 milliLiter(s) (100 mL/Hr) IV Continuous <Continuous>  dextrose 50% Injectable 25 Gram(s) IV Push once  dextrose 50% Injectable 12.5 Gram(s) IV Push once  dextrose 50% Injectable 25 Gram(s) IV Push once  enoxaparin Injectable 30 milliGRAM(s) SubCutaneous every 24 hours  ergocalciferol 96792 Unit(s) Oral every week  glucagon  Injectable 1 milliGRAM(s) IntraMuscular once  insulin lispro (ADMELOG) corrective regimen sliding scale   SubCutaneous three times a day before meals  insulin lispro (ADMELOG) corrective regimen sliding scale   SubCutaneous at bedtime  levothyroxine 75 MICROGram(s) Oral <User Schedule>  levothyroxine 50 MICROGram(s) Oral <User Schedule>  metoprolol tartrate 25 milliGRAM(s) Oral at bedtime  multivitamin 1 Tablet(s) Oral daily  sodium bicarbonate 650 milliGRAM(s) Oral three times a day      Assessment:  73-year-old female with history of HTN, CAD, carotid artery disease, DM, hypothyroid, and recent atraumatic right pelvic fracture (unclear exact fracture per patient but seems to indicate pubic rami) who presents with worsening right lower back pain which began approximately 4 to 5 days ago.  Patient states pain was mild initially at onset has been constant worsening since onset and denies any trauma or specific eliciting event.  No fever.  No numbness or weakness in extremities.  No loss of control of bowels or bladder.  No urinary symptoms no diarrhea.  No abdominal pain.  No nausea or vomiting. Further history from patient had right pelvic fracture which is likely his pubic rami though have no imaging in EMR and unclear history per patient.  Per her history no trauma or falls.    Recs:  cardiac stable  no e/o acs or chf  cw asa and cont with statin give hx of cad/pad  cw antihypertensives   diuretics per renal  pain control  ortho/nsgy eval - conservative management  s/p mri spine, results noted  dcp       Over 25 minutes spent on total encounter; more than 50% of the visit was spent counseling and/or coordinating care by the attending physician.      Carlos Diego MD   Cardiovascular Disease  [phone] Cardiovascular Disease Progress Note    Overnight events: No acute events overnight.  no new cardiac sx  Otherwise review of systems negative    Objective Findings:  T(C): 36.6 (09-18-23 @ 05:06), Max: 36.9 (09-17-23 @ 21:10)  HR: 57 (09-18-23 @ 05:06) (57 - 65)  BP: 145/68 (09-18-23 @ 05:06) (129/59 - 145/68)  RR: 18 (09-18-23 @ 05:06) (18 - 18)  SpO2: 99% (09-18-23 @ 05:06) (99% - 100%)  Wt(kg): --  Daily     Daily       Physical Exam:  Gen: NAD  HEENT: EOMI  CV: RRR, normal S1 + S2, no m/r/g  Lungs: CTAB  Abd: soft, non-tender  Ext: No edema    Telemetry:    Laboratory Data:                        10.7   11.14 )-----------( 321      ( 18 Sep 2023 07:02 )             32.9     09-18    128<L>  |  95<L>  |  22  ----------------------------<  125<H>  4.8   |  18<L>  |  1.13    Ca    9.4      18 Sep 2023 07:00                Inpatient Medications:  MEDICATIONS  (STANDING):  allopurinol 300 milliGRAM(s) Oral daily  amLODIPine   Tablet 5 milliGRAM(s) Oral daily  ascorbic acid 500 milliGRAM(s) Oral daily  aspirin 325 milliGRAM(s) Oral daily  atorvastatin 40 milliGRAM(s) Oral at bedtime  dextrose 5%. 1000 milliLiter(s) (50 mL/Hr) IV Continuous <Continuous>  dextrose 5%. 1000 milliLiter(s) (100 mL/Hr) IV Continuous <Continuous>  dextrose 50% Injectable 25 Gram(s) IV Push once  dextrose 50% Injectable 12.5 Gram(s) IV Push once  dextrose 50% Injectable 25 Gram(s) IV Push once  enoxaparin Injectable 30 milliGRAM(s) SubCutaneous every 24 hours  ergocalciferol 38496 Unit(s) Oral every week  glucagon  Injectable 1 milliGRAM(s) IntraMuscular once  insulin lispro (ADMELOG) corrective regimen sliding scale   SubCutaneous three times a day before meals  insulin lispro (ADMELOG) corrective regimen sliding scale   SubCutaneous at bedtime  levothyroxine 75 MICROGram(s) Oral <User Schedule>  levothyroxine 50 MICROGram(s) Oral <User Schedule>  metoprolol tartrate 25 milliGRAM(s) Oral at bedtime  multivitamin 1 Tablet(s) Oral daily  sodium bicarbonate 650 milliGRAM(s) Oral three times a day      Assessment:  73-year-old female with history of HTN, CAD, carotid artery disease, DM, hypothyroid, and recent atraumatic right pelvic fracture (unclear exact fracture per patient but seems to indicate pubic rami) who presents with worsening right lower back pain which began approximately 4 to 5 days ago.  Patient states pain was mild initially at onset has been constant worsening since onset and denies any trauma or specific eliciting event.  No fever.  No numbness or weakness in extremities.  No loss of control of bowels or bladder.  No urinary symptoms no diarrhea.  No abdominal pain.  No nausea or vomiting. Further history from patient had right pelvic fracture which is likely his pubic rami though have no imaging in EMR and unclear history per patient.  Per her history no trauma or falls.    Recs:  cardiac stable  no e/o acs or chf  cw asa and cont with statin give hx of cad/pad  cw antihypertensives   diuretics per renal  pain control  ortho/nsgy eval - conservative management  s/p mri spine, results noted  dcp       Over 25 minutes spent on total encounter; more than 50% of the visit was spent counseling and/or coordinating care by the attending physician.      Carlos Diego MD   Cardiovascular Disease  [phone]

## 2023-09-18 NOTE — PROGRESS NOTE ADULT - SUBJECTIVE AND OBJECTIVE BOX
Patient is a 73y old  Female who presents with a chief complaint of Pt is a 73-year-old female with history of HTN, CAD and unclear open heart surgery (per patient may be CABG, on  daily), DM, hypothyroid, and recent atraumatic right pelvic fracture (unclear exact fracture per patient but seems to indicate pubic rami) who presents with worsening right lower back pain     (10 Sep 2023 10:59)      SUBJECTIVE / OVERNIGHT EVENTS: Comfortable without new complaints.   Review of Systems  chest pain no  palpitations no  sob no  nausea no  headache no    MEDICATIONS  (STANDING):  allopurinol 300 milliGRAM(s) Oral daily  amLODIPine   Tablet 5 milliGRAM(s) Oral daily  ascorbic acid 500 milliGRAM(s) Oral daily  aspirin 325 milliGRAM(s) Oral daily  atorvastatin 40 milliGRAM(s) Oral at bedtime  dextrose 5%. 1000 milliLiter(s) (50 mL/Hr) IV Continuous <Continuous>  dextrose 5%. 1000 milliLiter(s) (100 mL/Hr) IV Continuous <Continuous>  dextrose 50% Injectable 25 Gram(s) IV Push once  dextrose 50% Injectable 12.5 Gram(s) IV Push once  dextrose 50% Injectable 25 Gram(s) IV Push once  enoxaparin Injectable 30 milliGRAM(s) SubCutaneous every 24 hours  ergocalciferol 75817 Unit(s) Oral every week  furosemide    Tablet 20 milliGRAM(s) Oral daily  glucagon  Injectable 1 milliGRAM(s) IntraMuscular once  insulin lispro (ADMELOG) corrective regimen sliding scale   SubCutaneous three times a day before meals  insulin lispro (ADMELOG) corrective regimen sliding scale   SubCutaneous at bedtime  levothyroxine 50 MICROGram(s) Oral <User Schedule>  levothyroxine 75 MICROGram(s) Oral <User Schedule>  metoprolol tartrate 25 milliGRAM(s) Oral at bedtime  multivitamin 1 Tablet(s) Oral daily  sodium bicarbonate 650 milliGRAM(s) Oral three times a day    MEDICATIONS  (PRN):  acetaminophen     Tablet .. 650 milliGRAM(s) Oral every 6 hours PRN Temp greater or equal to 38.5C (101.3F), Mild Pain (1 - 3)  dextrose Oral Gel 15 Gram(s) Oral once PRN Blood Glucose LESS THAN 70 milliGRAM(s)/deciliter      Vital Signs Last 24 Hrs  T(C): 36.5 (18 Sep 2023 12:36), Max: 36.9 (17 Sep 2023 21:10)  T(F): 97.7 (18 Sep 2023 12:36), Max: 98.4 (17 Sep 2023 21:10)  HR: 67 (18 Sep 2023 12:36) (57 - 67)  BP: 139/61 (18 Sep 2023 12:36) (129/59 - 145/68)  BP(mean): --  RR: 18 (18 Sep 2023 12:36) (18 - 18)  SpO2: 97% (18 Sep 2023 12:36) (97% - 100%)    Parameters below as of 18 Sep 2023 12:36  Patient On (Oxygen Delivery Method): room air        PHYSICAL EXAM:  GENERAL: NAD, well-developed  HEAD:  Atraumatic, Normocephalic  EYES: EOMI, PERRLA, conjunctiva and sclera clear  NECK: Supple, No JVD  CHEST/LUNG: Clear to auscultation bilaterally; No wheeze  HEART: Regular rate and rhythm; No murmurs, rubs, or gallops  ABDOMEN: Soft, Nontender, Nondistended; Bowel sounds present  EXTREMITIES:  2+ Peripheral Pulses, No clubbing, cyanosis, or edema  PSYCH: AAOx3  NEUROLOGY: non-focal  SKIN: No rashes or lesions    LABS:                        10.7   11.14 )-----------( 321      ( 18 Sep 2023 07:02 )             32.9     09-18    128<L>  |  95<L>  |  22  ----------------------------<  125<H>  4.8   |  18<L>  |  1.13    Ca    9.4      18 Sep 2023 07:00            Urinalysis Basic - ( 18 Sep 2023 07:00 )    Color: x / Appearance: x / SG: x / pH: x  Gluc: 125 mg/dL / Ketone: x  / Bili: x / Urobili: x   Blood: x / Protein: x / Nitrite: x   Leuk Esterase: x / RBC: x / WBC x   Sq Epi: x / Non Sq Epi: x / Bacteria: x          RADIOLOGY & ADDITIONAL TESTS:    Imaging Personally Reviewed:    Consultant(s) Notes Reviewed:      Care Discussed with Consultants/Other Providers:   Patient is a 73y old  Female who presents with a chief complaint of Pt is a 73-year-old female with history of HTN, CAD and unclear open heart surgery (per patient may be CABG, on  daily), DM, hypothyroid, and recent atraumatic right pelvic fracture (unclear exact fracture per patient but seems to indicate pubic rami) who presents with worsening right lower back pain     (10 Sep 2023 10:59)      SUBJECTIVE / OVERNIGHT EVENTS: Comfortable without new complaints.   Review of Systems  chest pain no  palpitations no  sob no  nausea no  headache no    MEDICATIONS  (STANDING):  allopurinol 300 milliGRAM(s) Oral daily  amLODIPine   Tablet 5 milliGRAM(s) Oral daily  ascorbic acid 500 milliGRAM(s) Oral daily  aspirin 325 milliGRAM(s) Oral daily  atorvastatin 40 milliGRAM(s) Oral at bedtime  dextrose 5%. 1000 milliLiter(s) (50 mL/Hr) IV Continuous <Continuous>  dextrose 5%. 1000 milliLiter(s) (100 mL/Hr) IV Continuous <Continuous>  dextrose 50% Injectable 25 Gram(s) IV Push once  dextrose 50% Injectable 12.5 Gram(s) IV Push once  dextrose 50% Injectable 25 Gram(s) IV Push once  enoxaparin Injectable 30 milliGRAM(s) SubCutaneous every 24 hours  ergocalciferol 71743 Unit(s) Oral every week  furosemide    Tablet 20 milliGRAM(s) Oral daily  glucagon  Injectable 1 milliGRAM(s) IntraMuscular once  insulin lispro (ADMELOG) corrective regimen sliding scale   SubCutaneous three times a day before meals  insulin lispro (ADMELOG) corrective regimen sliding scale   SubCutaneous at bedtime  levothyroxine 50 MICROGram(s) Oral <User Schedule>  levothyroxine 75 MICROGram(s) Oral <User Schedule>  metoprolol tartrate 25 milliGRAM(s) Oral at bedtime  multivitamin 1 Tablet(s) Oral daily  sodium bicarbonate 650 milliGRAM(s) Oral three times a day    MEDICATIONS  (PRN):  acetaminophen     Tablet .. 650 milliGRAM(s) Oral every 6 hours PRN Temp greater or equal to 38.5C (101.3F), Mild Pain (1 - 3)  dextrose Oral Gel 15 Gram(s) Oral once PRN Blood Glucose LESS THAN 70 milliGRAM(s)/deciliter      Vital Signs Last 24 Hrs  T(C): 36.5 (18 Sep 2023 12:36), Max: 36.9 (17 Sep 2023 21:10)  T(F): 97.7 (18 Sep 2023 12:36), Max: 98.4 (17 Sep 2023 21:10)  HR: 67 (18 Sep 2023 12:36) (57 - 67)  BP: 139/61 (18 Sep 2023 12:36) (129/59 - 145/68)  BP(mean): --  RR: 18 (18 Sep 2023 12:36) (18 - 18)  SpO2: 97% (18 Sep 2023 12:36) (97% - 100%)    Parameters below as of 18 Sep 2023 12:36  Patient On (Oxygen Delivery Method): room air        PHYSICAL EXAM:  GENERAL: NAD, well-developed  HEAD:  Atraumatic, Normocephalic  EYES: EOMI, PERRLA, conjunctiva and sclera clear  NECK: Supple, No JVD  CHEST/LUNG: Clear to auscultation bilaterally; No wheeze  HEART: Regular rate and rhythm; No murmurs, rubs, or gallops  ABDOMEN: Soft, Nontender, Nondistended; Bowel sounds present  EXTREMITIES:  2+ Peripheral Pulses, No clubbing, cyanosis, or edema  PSYCH: AAOx3  NEUROLOGY: non-focal  SKIN: No rashes or lesions    LABS:                        10.7   11.14 )-----------( 321      ( 18 Sep 2023 07:02 )             32.9     09-18    128<L>  |  95<L>  |  22  ----------------------------<  125<H>  4.8   |  18<L>  |  1.13    Ca    9.4      18 Sep 2023 07:00            Urinalysis Basic - ( 18 Sep 2023 07:00 )    Color: x / Appearance: x / SG: x / pH: x  Gluc: 125 mg/dL / Ketone: x  / Bili: x / Urobili: x   Blood: x / Protein: x / Nitrite: x   Leuk Esterase: x / RBC: x / WBC x   Sq Epi: x / Non Sq Epi: x / Bacteria: x          RADIOLOGY & ADDITIONAL TESTS:    Imaging Personally Reviewed:    Consultant(s) Notes Reviewed:      Care Discussed with Consultants/Other Providers:   Patient is a 73y old  Female who presents with a chief complaint of Pt is a 73-year-old female with history of HTN, CAD and unclear open heart surgery (per patient may be CABG, on  daily), DM, hypothyroid, and recent atraumatic right pelvic fracture (unclear exact fracture per patient but seems to indicate pubic rami) who presents with worsening right lower back pain     (10 Sep 2023 10:59)      SUBJECTIVE / OVERNIGHT EVENTS: Comfortable without new complaints.   Review of Systems  chest pain no  palpitations no  sob no  nausea no  headache no    MEDICATIONS  (STANDING):  allopurinol 300 milliGRAM(s) Oral daily  amLODIPine   Tablet 5 milliGRAM(s) Oral daily  ascorbic acid 500 milliGRAM(s) Oral daily  aspirin 325 milliGRAM(s) Oral daily  atorvastatin 40 milliGRAM(s) Oral at bedtime  dextrose 5%. 1000 milliLiter(s) (50 mL/Hr) IV Continuous <Continuous>  dextrose 5%. 1000 milliLiter(s) (100 mL/Hr) IV Continuous <Continuous>  dextrose 50% Injectable 25 Gram(s) IV Push once  dextrose 50% Injectable 12.5 Gram(s) IV Push once  dextrose 50% Injectable 25 Gram(s) IV Push once  enoxaparin Injectable 30 milliGRAM(s) SubCutaneous every 24 hours  ergocalciferol 47680 Unit(s) Oral every week  furosemide    Tablet 20 milliGRAM(s) Oral daily  glucagon  Injectable 1 milliGRAM(s) IntraMuscular once  insulin lispro (ADMELOG) corrective regimen sliding scale   SubCutaneous three times a day before meals  insulin lispro (ADMELOG) corrective regimen sliding scale   SubCutaneous at bedtime  levothyroxine 50 MICROGram(s) Oral <User Schedule>  levothyroxine 75 MICROGram(s) Oral <User Schedule>  metoprolol tartrate 25 milliGRAM(s) Oral at bedtime  multivitamin 1 Tablet(s) Oral daily  sodium bicarbonate 650 milliGRAM(s) Oral three times a day    MEDICATIONS  (PRN):  acetaminophen     Tablet .. 650 milliGRAM(s) Oral every 6 hours PRN Temp greater or equal to 38.5C (101.3F), Mild Pain (1 - 3)  dextrose Oral Gel 15 Gram(s) Oral once PRN Blood Glucose LESS THAN 70 milliGRAM(s)/deciliter      Vital Signs Last 24 Hrs  T(C): 36.5 (18 Sep 2023 12:36), Max: 36.9 (17 Sep 2023 21:10)  T(F): 97.7 (18 Sep 2023 12:36), Max: 98.4 (17 Sep 2023 21:10)  HR: 67 (18 Sep 2023 12:36) (57 - 67)  BP: 139/61 (18 Sep 2023 12:36) (129/59 - 145/68)  BP(mean): --  RR: 18 (18 Sep 2023 12:36) (18 - 18)  SpO2: 97% (18 Sep 2023 12:36) (97% - 100%)    Parameters below as of 18 Sep 2023 12:36  Patient On (Oxygen Delivery Method): room air        PHYSICAL EXAM:  GENERAL: NAD, well-developed  HEAD:  Atraumatic, Normocephalic  EYES: EOMI, PERRLA, conjunctiva and sclera clear  NECK: Supple, No JVD  CHEST/LUNG: Clear to auscultation bilaterally; No wheeze  HEART: Regular rate and rhythm; No murmurs, rubs, or gallops  ABDOMEN: Soft, Nontender, Nondistended; Bowel sounds present  EXTREMITIES:  2+ Peripheral Pulses, No clubbing, cyanosis, or edema  PSYCH: AAOx3  NEUROLOGY: non-focal  SKIN: No rashes or lesions    LABS:                        10.7   11.14 )-----------( 321      ( 18 Sep 2023 07:02 )             32.9     09-18    128<L>  |  95<L>  |  22  ----------------------------<  125<H>  4.8   |  18<L>  |  1.13    Ca    9.4      18 Sep 2023 07:00            Urinalysis Basic - ( 18 Sep 2023 07:00 )    Color: x / Appearance: x / SG: x / pH: x  Gluc: 125 mg/dL / Ketone: x  / Bili: x / Urobili: x   Blood: x / Protein: x / Nitrite: x   Leuk Esterase: x / RBC: x / WBC x   Sq Epi: x / Non Sq Epi: x / Bacteria: x          RADIOLOGY & ADDITIONAL TESTS:    Imaging Personally Reviewed:    Consultant(s) Notes Reviewed:      Care Discussed with Consultants/Other Providers:

## 2023-09-18 NOTE — PROGRESS NOTE ADULT - NUTRITIONAL ASSESSMENT
This patient has been assessed with a concern for Malnutrition and has been determined to have a diagnosis/diagnoses of Severe protein-calorie malnutrition.    This patient is being managed with:   Diet Regular-  Consistent Carbohydrate {Evening Snack} (CSTCHOSN)  1000mL Fluid Restriction ([id number])  Entered: Sep 12 2023 12:26PM   This patient has been assessed with a concern for Malnutrition and has been determined to have a diagnosis/diagnoses of Severe protein-calorie malnutrition.    This patient is being managed with:   Diet Regular-  Consistent Carbohydrate {Evening Snack} (CSTCHOSN)  1000mL Fluid Restriction ([id number])  Entered: Sep 12 2023 12:26PM   This patient has been assessed with a concern for Malnutrition and has been determined to have a diagnosis/diagnoses of Severe protein-calorie malnutrition.    This patient is being managed with:   Diet Regular-  Consistent Carbohydrate {Evening Snack} (CSTCHOSN)  1000mL Fluid Restriction ([id number])  Entered: Sep 12 2023 12:26PM

## 2023-09-19 LAB
ANION GAP SERPL CALC-SCNC: 14 MMOL/L — SIGNIFICANT CHANGE UP (ref 5–17)
BUN SERPL-MCNC: 24 MG/DL — HIGH (ref 7–23)
CALCIUM SERPL-MCNC: 9.4 MG/DL — SIGNIFICANT CHANGE UP (ref 8.4–10.5)
CHLORIDE SERPL-SCNC: 95 MMOL/L — LOW (ref 96–108)
CO2 SERPL-SCNC: 18 MMOL/L — LOW (ref 22–31)
CREAT SERPL-MCNC: 1.16 MG/DL — SIGNIFICANT CHANGE UP (ref 0.5–1.3)
EGFR: 49 ML/MIN/1.73M2 — LOW
GLUCOSE BLDC GLUCOMTR-MCNC: 136 MG/DL — HIGH (ref 70–99)
GLUCOSE BLDC GLUCOMTR-MCNC: 160 MG/DL — HIGH (ref 70–99)
GLUCOSE BLDC GLUCOMTR-MCNC: 177 MG/DL — HIGH (ref 70–99)
GLUCOSE BLDC GLUCOMTR-MCNC: 205 MG/DL — HIGH (ref 70–99)
GLUCOSE SERPL-MCNC: 124 MG/DL — HIGH (ref 70–99)
HCT VFR BLD CALC: 33.6 % — LOW (ref 34.5–45)
HGB BLD-MCNC: 10.9 G/DL — LOW (ref 11.5–15.5)
MCHC RBC-ENTMCNC: 30.7 PG — SIGNIFICANT CHANGE UP (ref 27–34)
MCHC RBC-ENTMCNC: 32.4 GM/DL — SIGNIFICANT CHANGE UP (ref 32–36)
MCV RBC AUTO: 94.6 FL — SIGNIFICANT CHANGE UP (ref 80–100)
NRBC # BLD: 0 /100 WBCS — SIGNIFICANT CHANGE UP (ref 0–0)
PLATELET # BLD AUTO: 322 K/UL — SIGNIFICANT CHANGE UP (ref 150–400)
POTASSIUM SERPL-MCNC: 4.1 MMOL/L — SIGNIFICANT CHANGE UP (ref 3.5–5.3)
POTASSIUM SERPL-SCNC: 4.1 MMOL/L — SIGNIFICANT CHANGE UP (ref 3.5–5.3)
RBC # BLD: 3.55 M/UL — LOW (ref 3.8–5.2)
RBC # FLD: 14.6 % — HIGH (ref 10.3–14.5)
SODIUM SERPL-SCNC: 127 MMOL/L — LOW (ref 135–145)
WBC # BLD: 10 K/UL — SIGNIFICANT CHANGE UP (ref 3.8–10.5)
WBC # FLD AUTO: 10 K/UL — SIGNIFICANT CHANGE UP (ref 3.8–10.5)

## 2023-09-19 RX ADMIN — Medication 2: at 09:15

## 2023-09-19 RX ADMIN — Medication 650 MILLIGRAM(S): at 05:46

## 2023-09-19 RX ADMIN — Medication 1 TABLET(S): at 11:17

## 2023-09-19 RX ADMIN — Medication 2: at 12:53

## 2023-09-19 RX ADMIN — Medication 325 MILLIGRAM(S): at 11:17

## 2023-09-19 RX ADMIN — ATORVASTATIN CALCIUM 40 MILLIGRAM(S): 80 TABLET, FILM COATED ORAL at 22:05

## 2023-09-19 RX ADMIN — Medication 300 MILLIGRAM(S): at 11:17

## 2023-09-19 RX ADMIN — Medication 20 MILLIGRAM(S): at 05:46

## 2023-09-19 RX ADMIN — Medication 500 MILLIGRAM(S): at 11:17

## 2023-09-19 RX ADMIN — AMLODIPINE BESYLATE 5 MILLIGRAM(S): 2.5 TABLET ORAL at 05:46

## 2023-09-19 RX ADMIN — ENOXAPARIN SODIUM 30 MILLIGRAM(S): 100 INJECTION SUBCUTANEOUS at 05:47

## 2023-09-19 RX ADMIN — Medication 75 MICROGRAM(S): at 09:15

## 2023-09-19 RX ADMIN — Medication 25 MILLIGRAM(S): at 22:05

## 2023-09-19 NOTE — PROGRESS NOTE ADULT - SUBJECTIVE AND OBJECTIVE BOX
Patient is a 73y old  Female who presents with a chief complaint of Pt is a 73-year-old female with history of HTN, CAD and unclear open heart surgery (per patient may be CABG, on  daily), DM, hypothyroid, and recent atraumatic right pelvic fracture (unclear exact fracture per patient but seems to indicate pubic rami) who presents with worsening right lower back pain     (10 Sep 2023 10:59)      SUBJECTIVE / OVERNIGHT EVENTS: No new complaints.   Review of Systems  chest pain no  palpitations no  sob no  nausea no  headache no    MEDICATIONS  (STANDING):  allopurinol 300 milliGRAM(s) Oral daily  amLODIPine   Tablet 5 milliGRAM(s) Oral daily  ascorbic acid 500 milliGRAM(s) Oral daily  aspirin 325 milliGRAM(s) Oral daily  atorvastatin 40 milliGRAM(s) Oral at bedtime  dextrose 5%. 1000 milliLiter(s) (50 mL/Hr) IV Continuous <Continuous>  dextrose 5%. 1000 milliLiter(s) (100 mL/Hr) IV Continuous <Continuous>  dextrose 50% Injectable 25 Gram(s) IV Push once  dextrose 50% Injectable 12.5 Gram(s) IV Push once  dextrose 50% Injectable 25 Gram(s) IV Push once  enoxaparin Injectable 30 milliGRAM(s) SubCutaneous every 24 hours  ergocalciferol 11731 Unit(s) Oral every week  glucagon  Injectable 1 milliGRAM(s) IntraMuscular once  insulin lispro (ADMELOG) corrective regimen sliding scale   SubCutaneous three times a day before meals  insulin lispro (ADMELOG) corrective regimen sliding scale   SubCutaneous at bedtime  levothyroxine 75 MICROGram(s) Oral <User Schedule>  levothyroxine 50 MICROGram(s) Oral <User Schedule>  metoprolol tartrate 25 milliGRAM(s) Oral at bedtime  multivitamin 1 Tablet(s) Oral daily    MEDICATIONS  (PRN):  acetaminophen     Tablet .. 650 milliGRAM(s) Oral every 6 hours PRN Temp greater or equal to 38.5C (101.3F), Mild Pain (1 - 3)  dextrose Oral Gel 15 Gram(s) Oral once PRN Blood Glucose LESS THAN 70 milliGRAM(s)/deciliter      Vital Signs Last 24 Hrs  T(C): 37.1 (19 Sep 2023 12:16), Max: 37.1 (19 Sep 2023 12:16)  T(F): 98.8 (19 Sep 2023 12:16), Max: 98.8 (19 Sep 2023 12:16)  HR: 67 (19 Sep 2023 12:16) (55 - 70)  BP: 129/61 (19 Sep 2023 12:16) (129/61 - 142/69)  BP(mean): --  RR: 18 (19 Sep 2023 12:16) (16 - 18)  SpO2: 99% (19 Sep 2023 12:16) (95% - 99%)    Parameters below as of 19 Sep 2023 12:16  Patient On (Oxygen Delivery Method): room air        PHYSICAL EXAM:  GENERAL: NAD   HEAD:  Atraumatic, Normocephalic  EYES: EOMI, PERRLA, conjunctiva and sclera clear  NECK: Supple, No JVD  CHEST/LUNG: Clear to auscultation bilaterally; No wheeze  HEART: Regular rate and rhythm; No murmurs, rubs, or gallops  ABDOMEN: Soft, Nontender, Nondistended; Bowel sounds present  EXTREMITIES:  2+ Peripheral Pulses, No clubbing, cyanosis, or edema  PSYCH: AAOx3  NEUROLOGY: non-focal  SKIN: No rashes or lesions    LABS:                        10.9   10.00 )-----------( 322      ( 19 Sep 2023 06:58 )             33.6     09-19    127<L>  |  95<L>  |  24<H>  ----------------------------<  124<H>  4.1   |  18<L>  |  1.16    Ca    9.4      19 Sep 2023 06:59            Urinalysis Basic - ( 19 Sep 2023 06:59 )    Color: x / Appearance: x / SG: x / pH: x  Gluc: 124 mg/dL / Ketone: x  / Bili: x / Urobili: x   Blood: x / Protein: x / Nitrite: x   Leuk Esterase: x / RBC: x / WBC x   Sq Epi: x / Non Sq Epi: x / Bacteria: x          RADIOLOGY & ADDITIONAL TESTS:    Imaging Personally Reviewed:    Consultant(s) Notes Reviewed:      Care Discussed with Consultants/Other Providers:   Patient is a 73y old  Female who presents with a chief complaint of Pt is a 73-year-old female with history of HTN, CAD and unclear open heart surgery (per patient may be CABG, on  daily), DM, hypothyroid, and recent atraumatic right pelvic fracture (unclear exact fracture per patient but seems to indicate pubic rami) who presents with worsening right lower back pain     (10 Sep 2023 10:59)      SUBJECTIVE / OVERNIGHT EVENTS: No new complaints.   Review of Systems  chest pain no  palpitations no  sob no  nausea no  headache no    MEDICATIONS  (STANDING):  allopurinol 300 milliGRAM(s) Oral daily  amLODIPine   Tablet 5 milliGRAM(s) Oral daily  ascorbic acid 500 milliGRAM(s) Oral daily  aspirin 325 milliGRAM(s) Oral daily  atorvastatin 40 milliGRAM(s) Oral at bedtime  dextrose 5%. 1000 milliLiter(s) (50 mL/Hr) IV Continuous <Continuous>  dextrose 5%. 1000 milliLiter(s) (100 mL/Hr) IV Continuous <Continuous>  dextrose 50% Injectable 25 Gram(s) IV Push once  dextrose 50% Injectable 12.5 Gram(s) IV Push once  dextrose 50% Injectable 25 Gram(s) IV Push once  enoxaparin Injectable 30 milliGRAM(s) SubCutaneous every 24 hours  ergocalciferol 59474 Unit(s) Oral every week  glucagon  Injectable 1 milliGRAM(s) IntraMuscular once  insulin lispro (ADMELOG) corrective regimen sliding scale   SubCutaneous three times a day before meals  insulin lispro (ADMELOG) corrective regimen sliding scale   SubCutaneous at bedtime  levothyroxine 75 MICROGram(s) Oral <User Schedule>  levothyroxine 50 MICROGram(s) Oral <User Schedule>  metoprolol tartrate 25 milliGRAM(s) Oral at bedtime  multivitamin 1 Tablet(s) Oral daily    MEDICATIONS  (PRN):  acetaminophen     Tablet .. 650 milliGRAM(s) Oral every 6 hours PRN Temp greater or equal to 38.5C (101.3F), Mild Pain (1 - 3)  dextrose Oral Gel 15 Gram(s) Oral once PRN Blood Glucose LESS THAN 70 milliGRAM(s)/deciliter      Vital Signs Last 24 Hrs  T(C): 37.1 (19 Sep 2023 12:16), Max: 37.1 (19 Sep 2023 12:16)  T(F): 98.8 (19 Sep 2023 12:16), Max: 98.8 (19 Sep 2023 12:16)  HR: 67 (19 Sep 2023 12:16) (55 - 70)  BP: 129/61 (19 Sep 2023 12:16) (129/61 - 142/69)  BP(mean): --  RR: 18 (19 Sep 2023 12:16) (16 - 18)  SpO2: 99% (19 Sep 2023 12:16) (95% - 99%)    Parameters below as of 19 Sep 2023 12:16  Patient On (Oxygen Delivery Method): room air        PHYSICAL EXAM:  GENERAL: NAD   HEAD:  Atraumatic, Normocephalic  EYES: EOMI, PERRLA, conjunctiva and sclera clear  NECK: Supple, No JVD  CHEST/LUNG: Clear to auscultation bilaterally; No wheeze  HEART: Regular rate and rhythm; No murmurs, rubs, or gallops  ABDOMEN: Soft, Nontender, Nondistended; Bowel sounds present  EXTREMITIES:  2+ Peripheral Pulses, No clubbing, cyanosis, or edema  PSYCH: AAOx3  NEUROLOGY: non-focal  SKIN: No rashes or lesions    LABS:                        10.9   10.00 )-----------( 322      ( 19 Sep 2023 06:58 )             33.6     09-19    127<L>  |  95<L>  |  24<H>  ----------------------------<  124<H>  4.1   |  18<L>  |  1.16    Ca    9.4      19 Sep 2023 06:59            Urinalysis Basic - ( 19 Sep 2023 06:59 )    Color: x / Appearance: x / SG: x / pH: x  Gluc: 124 mg/dL / Ketone: x  / Bili: x / Urobili: x   Blood: x / Protein: x / Nitrite: x   Leuk Esterase: x / RBC: x / WBC x   Sq Epi: x / Non Sq Epi: x / Bacteria: x          RADIOLOGY & ADDITIONAL TESTS:    Imaging Personally Reviewed:    Consultant(s) Notes Reviewed:      Care Discussed with Consultants/Other Providers:   Patient is a 73y old  Female who presents with a chief complaint of Pt is a 73-year-old female with history of HTN, CAD and unclear open heart surgery (per patient may be CABG, on  daily), DM, hypothyroid, and recent atraumatic right pelvic fracture (unclear exact fracture per patient but seems to indicate pubic rami) who presents with worsening right lower back pain     (10 Sep 2023 10:59)      SUBJECTIVE / OVERNIGHT EVENTS: No new complaints.   Review of Systems  chest pain no  palpitations no  sob no  nausea no  headache no    MEDICATIONS  (STANDING):  allopurinol 300 milliGRAM(s) Oral daily  amLODIPine   Tablet 5 milliGRAM(s) Oral daily  ascorbic acid 500 milliGRAM(s) Oral daily  aspirin 325 milliGRAM(s) Oral daily  atorvastatin 40 milliGRAM(s) Oral at bedtime  dextrose 5%. 1000 milliLiter(s) (50 mL/Hr) IV Continuous <Continuous>  dextrose 5%. 1000 milliLiter(s) (100 mL/Hr) IV Continuous <Continuous>  dextrose 50% Injectable 25 Gram(s) IV Push once  dextrose 50% Injectable 12.5 Gram(s) IV Push once  dextrose 50% Injectable 25 Gram(s) IV Push once  enoxaparin Injectable 30 milliGRAM(s) SubCutaneous every 24 hours  ergocalciferol 15695 Unit(s) Oral every week  glucagon  Injectable 1 milliGRAM(s) IntraMuscular once  insulin lispro (ADMELOG) corrective regimen sliding scale   SubCutaneous three times a day before meals  insulin lispro (ADMELOG) corrective regimen sliding scale   SubCutaneous at bedtime  levothyroxine 75 MICROGram(s) Oral <User Schedule>  levothyroxine 50 MICROGram(s) Oral <User Schedule>  metoprolol tartrate 25 milliGRAM(s) Oral at bedtime  multivitamin 1 Tablet(s) Oral daily    MEDICATIONS  (PRN):  acetaminophen     Tablet .. 650 milliGRAM(s) Oral every 6 hours PRN Temp greater or equal to 38.5C (101.3F), Mild Pain (1 - 3)  dextrose Oral Gel 15 Gram(s) Oral once PRN Blood Glucose LESS THAN 70 milliGRAM(s)/deciliter      Vital Signs Last 24 Hrs  T(C): 37.1 (19 Sep 2023 12:16), Max: 37.1 (19 Sep 2023 12:16)  T(F): 98.8 (19 Sep 2023 12:16), Max: 98.8 (19 Sep 2023 12:16)  HR: 67 (19 Sep 2023 12:16) (55 - 70)  BP: 129/61 (19 Sep 2023 12:16) (129/61 - 142/69)  BP(mean): --  RR: 18 (19 Sep 2023 12:16) (16 - 18)  SpO2: 99% (19 Sep 2023 12:16) (95% - 99%)    Parameters below as of 19 Sep 2023 12:16  Patient On (Oxygen Delivery Method): room air        PHYSICAL EXAM:  GENERAL: NAD   HEAD:  Atraumatic, Normocephalic  EYES: EOMI, PERRLA, conjunctiva and sclera clear  NECK: Supple, No JVD  CHEST/LUNG: Clear to auscultation bilaterally; No wheeze  HEART: Regular rate and rhythm; No murmurs, rubs, or gallops  ABDOMEN: Soft, Nontender, Nondistended; Bowel sounds present  EXTREMITIES:  2+ Peripheral Pulses, No clubbing, cyanosis, or edema  PSYCH: AAOx3  NEUROLOGY: non-focal  SKIN: No rashes or lesions    LABS:                        10.9   10.00 )-----------( 322      ( 19 Sep 2023 06:58 )             33.6     09-19    127<L>  |  95<L>  |  24<H>  ----------------------------<  124<H>  4.1   |  18<L>  |  1.16    Ca    9.4      19 Sep 2023 06:59            Urinalysis Basic - ( 19 Sep 2023 06:59 )    Color: x / Appearance: x / SG: x / pH: x  Gluc: 124 mg/dL / Ketone: x  / Bili: x / Urobili: x   Blood: x / Protein: x / Nitrite: x   Leuk Esterase: x / RBC: x / WBC x   Sq Epi: x / Non Sq Epi: x / Bacteria: x          RADIOLOGY & ADDITIONAL TESTS:    Imaging Personally Reviewed:    Consultant(s) Notes Reviewed:      Care Discussed with Consultants/Other Providers:

## 2023-09-19 NOTE — CHART NOTE - NSCHARTNOTEFT_GEN_A_CORE
Nutrition Follow Up Note  Patient seen for: malnutrition initial follow up    Chart reviewed, events noted.    Source: medical record, patient.     Per chart, "Patient is a 73y old  Female who presents with a chief complaint of Pt is a 73-year-old female with history of HTN, CAD and unclear open heart surgery (per patient may be CABG, on  daily), DM, hypothyroid, and recent atraumatic right pelvic fracture (unclear exact fracture per patient but seems to indicate pubic rami) who presents with worsening right lower back pain"    Diet Order:   Diet, Regular:   Consistent Carbohydrate {Evening Snack} (CSTCHOSN)  1000mL Fluid Restriction ([id number]) (09-12-23)    - Is current order appropriate/adequate? [x] Yes  []  No:     Nutrition-related events:  -Pt reports good appetite and fair PO intake, has been eating most of food on tray at meal time. 26-75% intake noted per flowsheets.  -Pt reports no nausea/vomiting, diarrhea, or constipation. Last BM this morning 9/19.  -Ordered for micronutrient supplementation: multivitamin, vitamin C, ergocalciferol.  -Elevated finger sticks noted - ordered for consistent carbohydrate diet and sliding scale insulin.  -Low Na noted. Per nephro note: "sodium still not improving despite sodium bicarb tabs and resuming lasix ... plan to give samsca tomorrow am for hyponatremia."    Weights:   Dosing weight: 142.4lb (9/8/23)  RD-obtained bed weight at time of visit 9/19: 155.8lb.   No daily weights available per flowsheets, RD to continue to monitor weight trends as available/able.     Nutritionally Pertinent MEDICATIONS  (STANDING):  allopurinol  amLODIPine   Tablet  ascorbic acid  atorvastatin  ergocalciferol  glucagon  Injectable  insulin lispro (ADMELOG) corrective regimen sliding scale  levothyroxine  metoprolol tartrate  multivitamin    Pertinent Labs:  09-19   Na 127 mmol/L<L>   Glu 124 mg/dL<H>   K+ 4.1 mmol/L   Cr 1.16 mg/dL   BUN 24 mg/dL<H>   A1C: 6.0 % (09-08-23 @ 06:58)    Finger Sticks:  POCT Blood Glucose.: 177 mg/dL (09-19 @ 12:31)  POCT Blood Glucose.: 160 mg/dL (09-19 @ 08:30)  POCT Blood Glucose.: 200 mg/dL (09-18 @ 21:53)  POCT Blood Glucose.: 142 mg/dL (09-18 @ 17:47)      (Per flowsheet)  Pressure Injuries: stage 2 mid-back per flowsheets     Edema: 1+ left foot, right foot    Estimated Needs: based on   [x] no change since previous assessment  -Energy: 25-30 kcal/kg (0518-6926 kcal/day)   -Protein: 1.2-1.4 g/kg (77-90 g/day)   Defer fluid needs to team.     Previous Nutrition Diagnosis: Severe acute malnutrition  Nutrition Diagnosis is: Ongoing  -Being addressed with micronutrient supplementation and obtaining preferences.    New Nutrition Diagnosis: [x] Not applicable    Nutrition Care Plan:  [x] In Progress  [] Achieved  [] Not applicable    Nutrition Interventions:     Education Provided:       [x] Yes:  Encouraged protein-energy intake for healing.     Recommendations:      1) Continue current diet: consistent carbohydrate in setting of elevated finger sticks. Defer fluid restriction to team.   2) Honor patient's food preferences as available/able. Patient declining oral nutrition supplements at this time.   3) Continue with micronutrient supplementation pending no contraindications: multivitamin, vitamin C, ergocalciferol.     Monitoring and Evaluation:   Continue to monitor nutritional intake, tolerance to diet prescription, weights, labs, skin integrity      RD remains available upon request and will follow up per protocol    Corinne Lima, Registered Dietitian, available via TEAMS Nutrition Follow Up Note  Patient seen for: malnutrition initial follow up    Chart reviewed, events noted.    Source: medical record, patient.     Per chart, "Patient is a 73y old  Female who presents with a chief complaint of Pt is a 73-year-old female with history of HTN, CAD and unclear open heart surgery (per patient may be CABG, on  daily), DM, hypothyroid, and recent atraumatic right pelvic fracture (unclear exact fracture per patient but seems to indicate pubic rami) who presents with worsening right lower back pain"    Diet Order:   Diet, Regular:   Consistent Carbohydrate {Evening Snack} (CSTCHOSN)  1000mL Fluid Restriction ([id number]) (09-12-23)    - Is current order appropriate/adequate? [x] Yes  []  No:     Nutrition-related events:  -Pt reports good appetite and fair PO intake, has been eating most of food on tray at meal time. 26-75% intake noted per flowsheets.  -Pt reports no nausea/vomiting, diarrhea, or constipation. Last BM this morning 9/19.  -Ordered for micronutrient supplementation: multivitamin, vitamin C, ergocalciferol.  -Elevated finger sticks noted - ordered for consistent carbohydrate diet and sliding scale insulin.  -Low Na noted. Per nephro note: "sodium still not improving despite sodium bicarb tabs and resuming lasix ... plan to give samsca tomorrow am for hyponatremia."    Weights:   Dosing weight: 142.4lb (9/8/23)  RD-obtained bed weight at time of visit 9/19: 155.8lb.   No daily weights available per flowsheets, RD to continue to monitor weight trends as available/able.     Nutritionally Pertinent MEDICATIONS  (STANDING):  allopurinol  amLODIPine   Tablet  ascorbic acid  atorvastatin  ergocalciferol  glucagon  Injectable  insulin lispro (ADMELOG) corrective regimen sliding scale  levothyroxine  metoprolol tartrate  multivitamin    Pertinent Labs:  09-19   Na 127 mmol/L<L>   Glu 124 mg/dL<H>   K+ 4.1 mmol/L   Cr 1.16 mg/dL   BUN 24 mg/dL<H>   A1C: 6.0 % (09-08-23 @ 06:58)    Finger Sticks:  POCT Blood Glucose.: 177 mg/dL (09-19 @ 12:31)  POCT Blood Glucose.: 160 mg/dL (09-19 @ 08:30)  POCT Blood Glucose.: 200 mg/dL (09-18 @ 21:53)  POCT Blood Glucose.: 142 mg/dL (09-18 @ 17:47)      (Per flowsheet)  Pressure Injuries: stage 2 mid-back per flowsheets     Edema: 1+ left foot, right foot    Estimated Needs: based on   [x] no change since previous assessment  -Energy: 25-30 kcal/kg (7033-7519 kcal/day)   -Protein: 1.2-1.4 g/kg (77-90 g/day)   Defer fluid needs to team.     Previous Nutrition Diagnosis: Severe acute malnutrition  Nutrition Diagnosis is: Ongoing  -Being addressed with micronutrient supplementation and obtaining preferences.    New Nutrition Diagnosis: [x] Not applicable    Nutrition Care Plan:  [x] In Progress  [] Achieved  [] Not applicable    Nutrition Interventions:     Education Provided:       [x] Yes:  Encouraged protein-energy intake for healing.     Recommendations:      1) Continue current diet: consistent carbohydrate in setting of elevated finger sticks. Defer fluid restriction to team.   2) Honor patient's food preferences as available/able. Patient declining oral nutrition supplements at this time.   3) Continue with micronutrient supplementation pending no contraindications: multivitamin, vitamin C, ergocalciferol.     Monitoring and Evaluation:   Continue to monitor nutritional intake, tolerance to diet prescription, weights, labs, skin integrity      RD remains available upon request and will follow up per protocol    Corinne Lima, Registered Dietitian, available via TEAMS Nutrition Follow Up Note  Patient seen for: malnutrition initial follow up    Chart reviewed, events noted.    Source: medical record, patient.     Per chart, "Patient is a 73y old  Female who presents with a chief complaint of Pt is a 73-year-old female with history of HTN, CAD and unclear open heart surgery (per patient may be CABG, on  daily), DM, hypothyroid, and recent atraumatic right pelvic fracture (unclear exact fracture per patient but seems to indicate pubic rami) who presents with worsening right lower back pain"    Diet Order:   Diet, Regular:   Consistent Carbohydrate {Evening Snack} (CSTCHOSN)  1000mL Fluid Restriction ([id number]) (09-12-23)    - Is current order appropriate/adequate? [x] Yes  []  No:     Nutrition-related events:  -Pt reports good appetite and fair PO intake, has been eating most of food on tray at meal time. 26-75% intake noted per flowsheets.  -Pt reports no nausea/vomiting, diarrhea, or constipation. Last BM this morning 9/19.  -Ordered for micronutrient supplementation: multivitamin, vitamin C, ergocalciferol.  -Elevated finger sticks noted - ordered for consistent carbohydrate diet and sliding scale insulin.  -Low Na noted. Per nephro note: "sodium still not improving despite sodium bicarb tabs and resuming lasix ... plan to give samsca tomorrow am for hyponatremia."    Weights:   Dosing weight: 142.4lb (9/8/23)  RD-obtained bed weight at time of visit 9/19: 155.8lb.   No daily weights available per flowsheets, RD to continue to monitor weight trends as available/able.     Nutritionally Pertinent MEDICATIONS  (STANDING):  allopurinol  amLODIPine   Tablet  ascorbic acid  atorvastatin  ergocalciferol  glucagon  Injectable  insulin lispro (ADMELOG) corrective regimen sliding scale  levothyroxine  metoprolol tartrate  multivitamin    Pertinent Labs:  09-19   Na 127 mmol/L<L>   Glu 124 mg/dL<H>   K+ 4.1 mmol/L   Cr 1.16 mg/dL   BUN 24 mg/dL<H>   A1C: 6.0 % (09-08-23 @ 06:58)    Finger Sticks:  POCT Blood Glucose.: 177 mg/dL (09-19 @ 12:31)  POCT Blood Glucose.: 160 mg/dL (09-19 @ 08:30)  POCT Blood Glucose.: 200 mg/dL (09-18 @ 21:53)  POCT Blood Glucose.: 142 mg/dL (09-18 @ 17:47)      (Per flowsheet)  Pressure Injuries: stage 2 mid-back per flowsheets     Edema: 1+ left foot, right foot    Estimated Needs: based on   [x] no change since previous assessment  -Energy: 25-30 kcal/kg (7618-6632 kcal/day)   -Protein: 1.2-1.4 g/kg (77-90 g/day)   Defer fluid needs to team.     Previous Nutrition Diagnosis: Severe acute malnutrition  Nutrition Diagnosis is: Ongoing  -Being addressed with micronutrient supplementation and obtaining preferences.    New Nutrition Diagnosis: [x] Not applicable    Nutrition Care Plan:  [x] In Progress  [] Achieved  [] Not applicable    Nutrition Interventions:     Education Provided:       [x] Yes:  Encouraged protein-energy intake for healing.     Recommendations:      1) Continue current diet: consistent carbohydrate in setting of elevated finger sticks. Defer fluid restriction to team.   2) Honor patient's food preferences as available/able. Patient declining oral nutrition supplements at this time.   3) Continue with micronutrient supplementation pending no contraindications: multivitamin, vitamin C, ergocalciferol.     Monitoring and Evaluation:   Continue to monitor nutritional intake, tolerance to diet prescription, weights, labs, skin integrity      RD remains available upon request and will follow up per protocol    Corinne Lima, Registered Dietitian, available via TEAMS Nutrition Follow Up Note  Patient seen for: malnutrition initial follow up    Chart reviewed, events noted.    Source: medical record, patient.     Per chart, "Patient is a 73y old  Female who presents with a chief complaint of Pt is a 73-year-old female with history of HTN, CAD and unclear open heart surgery (per patient may be CABG, on  daily), DM, hypothyroid, and recent atraumatic right pelvic fracture (unclear exact fracture per patient but seems to indicate pubic rami) who presents with worsening right lower back pain"    Diet Order:   Diet, Regular:   Consistent Carbohydrate {Evening Snack} (CSTCHOSN)  1000mL Fluid Restriction ([id number]) (09-12-23)    - Is current order appropriate/adequate? [x] Yes  []  No:     Nutrition-related events:  -Pt reports good appetite and fair PO intake, has been eating most of food on tray at meal time. 26-75% intake noted per flowsheets.  -Pt reports no nausea/vomiting, diarrhea, or constipation. Last BM this morning 9/19. Bowel regimen: none.  -Ordered for micronutrient supplementation: multivitamin, vitamin C, ergocalciferol.  -Elevated finger sticks noted - ordered for consistent carbohydrate diet and sliding scale insulin.  -Low Na noted. Per nephro note: "sodium still not improving despite sodium bicarb tabs and resuming lasix ... plan to give samsca tomorrow am for hyponatremia."    Weights:   Dosing weight: 142.4lb (9/8/23)  RD-obtained bed weight at time of visit 9/19/23: 155.8lb.  No daily weights available per flowsheets, ? wt accuracy w/ besdscale. RD to continue to monitor weight trends as available/able.     Nutritionally Pertinent MEDICATIONS  (STANDING):  allopurinol  amLODIPine   Tablet  ascorbic acid  atorvastatin  ergocalciferol  glucagon  Injectable  insulin lispro (ADMELOG) corrective regimen sliding scale  levothyroxine  metoprolol tartrate  multivitamin    Pertinent Labs:  09-19   Na 127 mmol/L<L>   Glu 124 mg/dL<H>   K+ 4.1 mmol/L   Cr 1.16 mg/dL   BUN 24 mg/dL<H>   A1C: 6.0 % (09-08-23 @ 06:58)    Finger Sticks:  POCT Blood Glucose.: 177 mg/dL (09-19 @ 12:31)  POCT Blood Glucose.: 160 mg/dL (09-19 @ 08:30)  POCT Blood Glucose.: 200 mg/dL (09-18 @ 21:53)  POCT Blood Glucose.: 142 mg/dL (09-18 @ 17:47)      (Per flowsheet)  Pressure Injuries: stage 2 mid-back per flowsheets     Edema: 1+ left foot, right foot    Estimated Needs: based on dosing weight 142.4lb/ 64.5kg.  [x] no change since previous assessment  -Energy: 25-30 kcal/kg (7442-3011 kcal/day)   -Protein: 1.2-1.4 g/kg (77-90 g/day)   Defer fluid needs to team.     Previous Nutrition Diagnosis: Severe acute malnutrition  Nutrition Diagnosis is: Ongoing  -Being addressed with micronutrient supplementation and obtaining preferences.    Previous Nutrition Diagnosis: Increased nutrient needs (protein)  Nutrition Diagnosis is: Ongoing  -Being addressed with micronutrient supplementation and obtaining preferences.    New Nutrition Diagnosis: [x] Not applicable    Nutrition Care Plan:  [x] In Progress  [] Achieved  [] Not applicable    Nutrition Interventions:     Education Provided:       [x] Yes:  Encouraged protein-energy intake for healing.     Recommendations:      1) Continue current diet: consistent carbohydrate in setting of elevated finger sticks. Defer fluid restriction to team.   2) Honor patient's food preferences as available/able. Patient declining oral nutrition supplements at this time.   3) Continue with micronutrient supplementation pending no contraindications: multivitamin, vitamin C, ergocalciferol.     Monitoring and Evaluation:   Continue to monitor nutritional intake, tolerance to diet prescription, weights, labs, skin integrity      RD remains available upon request and will follow up per protocol    Corinne Lima, Registered Dietitian, available via TEAMS Nutrition Follow Up Note  Patient seen for: malnutrition initial follow up    Chart reviewed, events noted.    Source: medical record, patient.     Per chart, "Patient is a 73y old  Female who presents with a chief complaint of Pt is a 73-year-old female with history of HTN, CAD and unclear open heart surgery (per patient may be CABG, on  daily), DM, hypothyroid, and recent atraumatic right pelvic fracture (unclear exact fracture per patient but seems to indicate pubic rami) who presents with worsening right lower back pain"    Diet Order:   Diet, Regular:   Consistent Carbohydrate {Evening Snack} (CSTCHOSN)  1000mL Fluid Restriction ([id number]) (09-12-23)    - Is current order appropriate/adequate? [x] Yes  []  No:     Nutrition-related events:  -Pt reports good appetite and fair PO intake, has been eating most of food on tray at meal time. 26-75% intake noted per flowsheets.  -Pt reports no nausea/vomiting, diarrhea, or constipation. Last BM this morning 9/19. Bowel regimen: none.  -Ordered for micronutrient supplementation: multivitamin, vitamin C, ergocalciferol.  -Elevated finger sticks noted - ordered for consistent carbohydrate diet and sliding scale insulin.  -Low Na noted. Per nephro note: "sodium still not improving despite sodium bicarb tabs and resuming lasix ... plan to give samsca tomorrow am for hyponatremia."    Weights:   Dosing weight: 142.4lb (9/8/23)  RD-obtained bed weight at time of visit 9/19/23: 155.8lb.  No daily weights available per flowsheets, ? wt accuracy w/ besdscale. RD to continue to monitor weight trends as available/able.     Nutritionally Pertinent MEDICATIONS  (STANDING):  allopurinol  amLODIPine   Tablet  ascorbic acid  atorvastatin  ergocalciferol  glucagon  Injectable  insulin lispro (ADMELOG) corrective regimen sliding scale  levothyroxine  metoprolol tartrate  multivitamin    Pertinent Labs:  09-19   Na 127 mmol/L<L>   Glu 124 mg/dL<H>   K+ 4.1 mmol/L   Cr 1.16 mg/dL   BUN 24 mg/dL<H>   A1C: 6.0 % (09-08-23 @ 06:58)    Finger Sticks:  POCT Blood Glucose.: 177 mg/dL (09-19 @ 12:31)  POCT Blood Glucose.: 160 mg/dL (09-19 @ 08:30)  POCT Blood Glucose.: 200 mg/dL (09-18 @ 21:53)  POCT Blood Glucose.: 142 mg/dL (09-18 @ 17:47)      (Per flowsheet)  Pressure Injuries: stage 2 mid-back per flowsheets     Edema: 1+ left foot, right foot    Estimated Needs: based on dosing weight 142.4lb/ 64.5kg.  [x] no change since previous assessment  -Energy: 25-30 kcal/kg (6583-2710 kcal/day)   -Protein: 1.2-1.4 g/kg (77-90 g/day)   Defer fluid needs to team.     Previous Nutrition Diagnosis: Severe acute malnutrition  Nutrition Diagnosis is: Ongoing  -Being addressed with micronutrient supplementation and obtaining preferences.    Previous Nutrition Diagnosis: Increased nutrient needs (protein)  Nutrition Diagnosis is: Ongoing  -Being addressed with micronutrient supplementation and obtaining preferences.    New Nutrition Diagnosis: [x] Not applicable    Nutrition Care Plan:  [x] In Progress  [] Achieved  [] Not applicable    Nutrition Interventions:     Education Provided:       [x] Yes:  Encouraged protein-energy intake for healing.     Recommendations:      1) Continue current diet: consistent carbohydrate in setting of elevated finger sticks. Defer fluid restriction to team.   2) Honor patient's food preferences as available/able. Patient declining oral nutrition supplements at this time.   3) Continue with micronutrient supplementation pending no contraindications: multivitamin, vitamin C, ergocalciferol.     Monitoring and Evaluation:   Continue to monitor nutritional intake, tolerance to diet prescription, weights, labs, skin integrity      RD remains available upon request and will follow up per protocol    Corinne Lima, Registered Dietitian, available via TEAMS Nutrition Follow Up Note  Patient seen for: malnutrition initial follow up    Chart reviewed, events noted.    Source: medical record, patient.     Per chart, "Patient is a 73y old  Female who presents with a chief complaint of Pt is a 73-year-old female with history of HTN, CAD and unclear open heart surgery (per patient may be CABG, on  daily), DM, hypothyroid, and recent atraumatic right pelvic fracture (unclear exact fracture per patient but seems to indicate pubic rami) who presents with worsening right lower back pain"    Diet Order:   Diet, Regular:   Consistent Carbohydrate {Evening Snack} (CSTCHOSN)  1000mL Fluid Restriction ([id number]) (09-12-23)    - Is current order appropriate/adequate? [x] Yes  []  No:     Nutrition-related events:  -Pt reports good appetite and fair PO intake, has been eating most of food on tray at meal time. 26-75% intake noted per flowsheets.  -Pt reports no nausea/vomiting, diarrhea, or constipation. Last BM this morning 9/19. Bowel regimen: none.  -Ordered for micronutrient supplementation: multivitamin, vitamin C, ergocalciferol.  -Elevated finger sticks noted - ordered for consistent carbohydrate diet and sliding scale insulin.  -Low Na noted. Per nephro note: "sodium still not improving despite sodium bicarb tabs and resuming lasix ... plan to give samsca tomorrow am for hyponatremia."    Weights:   Dosing weight: 142.4lb (9/8/23)  RD-obtained bed weight at time of visit 9/19/23: 155.8lb.  No daily weights available per flowsheets, ? wt accuracy w/ besdscale. RD to continue to monitor weight trends as available/able.     Nutritionally Pertinent MEDICATIONS  (STANDING):  allopurinol  amLODIPine   Tablet  ascorbic acid  atorvastatin  ergocalciferol  glucagon  Injectable  insulin lispro (ADMELOG) corrective regimen sliding scale  levothyroxine  metoprolol tartrate  multivitamin    Pertinent Labs:  09-19   Na 127 mmol/L<L>   Glu 124 mg/dL<H>   K+ 4.1 mmol/L   Cr 1.16 mg/dL   BUN 24 mg/dL<H>   A1C: 6.0 % (09-08-23 @ 06:58)    Finger Sticks:  POCT Blood Glucose.: 177 mg/dL (09-19 @ 12:31)  POCT Blood Glucose.: 160 mg/dL (09-19 @ 08:30)  POCT Blood Glucose.: 200 mg/dL (09-18 @ 21:53)  POCT Blood Glucose.: 142 mg/dL (09-18 @ 17:47)      (Per flowsheet)  Pressure Injuries: stage 2 mid-back per flowsheets     Edema: 1+ left foot, right foot    Estimated Needs: based on dosing weight 142.4lb/ 64.5kg.  [x] no change since previous assessment  -Energy: 25-30 kcal/kg (8397-4145 kcal/day)   -Protein: 1.2-1.4 g/kg (77-90 g/day)   Defer fluid needs to team.     Previous Nutrition Diagnosis: Severe acute malnutrition  Nutrition Diagnosis is: Ongoing  -Being addressed with micronutrient supplementation and obtaining preferences.    Previous Nutrition Diagnosis: Increased nutrient needs (protein)  Nutrition Diagnosis is: Ongoing  -Being addressed with micronutrient supplementation and obtaining preferences.    New Nutrition Diagnosis: [x] Not applicable    Nutrition Care Plan:  [x] In Progress  [] Achieved  [] Not applicable    Nutrition Interventions:     Education Provided:       [x] Yes:  Encouraged protein-energy intake for healing.     Recommendations:      1) Continue current diet: consistent carbohydrate in setting of elevated finger sticks. Defer fluid restriction to team.   2) Honor patient's food preferences as available/able. Patient declining oral nutrition supplements at this time.   3) Continue with micronutrient supplementation pending no contraindications: multivitamin, vitamin C, ergocalciferol.     Monitoring and Evaluation:   Continue to monitor nutritional intake, tolerance to diet prescription, weights, labs, skin integrity      RD remains available upon request and will follow up per protocol    Corinne Lima, Registered Dietitian, available via TEAMS

## 2023-09-19 NOTE — PROGRESS NOTE ADULT - SUBJECTIVE AND OBJECTIVE BOX
Cardiovascular Disease Progress Note    Overnight events: No acute events overnight.  no new cardiac sx  Otherwise review of systems negative    Objective Findings:  T(C): 36.6 (09-19-23 @ 04:24), Max: 36.8 (09-18-23 @ 22:35)  HR: 55 (09-19-23 @ 04:24) (55 - 70)  BP: 142/69 (09-19-23 @ 04:24) (136/73 - 142/69)  RR: 18 (09-19-23 @ 04:24) (16 - 18)  SpO2: 95% (09-19-23 @ 04:24) (95% - 99%)  Wt(kg): --  Daily     Daily       Physical Exam:  Gen: NAD  HEENT: EOMI  CV: RRR, normal S1 + S2, no m/r/g  Lungs: CTAB  Abd: soft, non-tender  Ext: No edema    Telemetry:    Laboratory Data:                        10.9   10.00 )-----------( 322      ( 19 Sep 2023 06:58 )             33.6     09-19    127<L>  |  95<L>  |  24<H>  ----------------------------<  124<H>  4.1   |  18<L>  |  1.16    Ca    9.4      19 Sep 2023 06:59                Inpatient Medications:  MEDICATIONS  (STANDING):  allopurinol 300 milliGRAM(s) Oral daily  amLODIPine   Tablet 5 milliGRAM(s) Oral daily  ascorbic acid 500 milliGRAM(s) Oral daily  aspirin 325 milliGRAM(s) Oral daily  atorvastatin 40 milliGRAM(s) Oral at bedtime  dextrose 5%. 1000 milliLiter(s) (100 mL/Hr) IV Continuous <Continuous>  dextrose 5%. 1000 milliLiter(s) (50 mL/Hr) IV Continuous <Continuous>  dextrose 50% Injectable 25 Gram(s) IV Push once  dextrose 50% Injectable 12.5 Gram(s) IV Push once  dextrose 50% Injectable 25 Gram(s) IV Push once  enoxaparin Injectable 30 milliGRAM(s) SubCutaneous every 24 hours  ergocalciferol 24480 Unit(s) Oral every week  furosemide    Tablet 20 milliGRAM(s) Oral daily  glucagon  Injectable 1 milliGRAM(s) IntraMuscular once  insulin lispro (ADMELOG) corrective regimen sliding scale   SubCutaneous three times a day before meals  insulin lispro (ADMELOG) corrective regimen sliding scale   SubCutaneous at bedtime  levothyroxine 50 MICROGram(s) Oral <User Schedule>  levothyroxine 75 MICROGram(s) Oral <User Schedule>  metoprolol tartrate 25 milliGRAM(s) Oral at bedtime  multivitamin 1 Tablet(s) Oral daily  sodium bicarbonate 650 milliGRAM(s) Oral three times a day      Assessment:  73-year-old female with history of HTN, CAD, carotid artery disease, DM, hypothyroid, and recent atraumatic right pelvic fracture (unclear exact fracture per patient but seems to indicate pubic rami) who presents with worsening right lower back pain which began approximately 4 to 5 days ago.  Patient states pain was mild initially at onset has been constant worsening since onset and denies any trauma or specific eliciting event.  No fever.  No numbness or weakness in extremities.  No loss of control of bowels or bladder.  No urinary symptoms no diarrhea.  No abdominal pain.  No nausea or vomiting. Further history from patient had right pelvic fracture which is likely his pubic rami though have no imaging in EMR and unclear history per patient.  Per her history no trauma or falls.    Recs:  cardiac stable  no e/o acs or chf  cw asa and cont with statin give hx of cad/pad  cw antihypertensives   diuretics per renal  pain control  ortho/nsgy eval - conservative management  s/p mri spine, results noted  dcp  to paco        Over 25 minutes spent on total encounter; more than 50% of the visit was spent counseling and/or coordinating care by the attending physician.      Carlos Diego MD   Cardiovascular Disease  [phone] Cardiovascular Disease Progress Note    Overnight events: No acute events overnight.  no new cardiac sx  Otherwise review of systems negative    Objective Findings:  T(C): 36.6 (09-19-23 @ 04:24), Max: 36.8 (09-18-23 @ 22:35)  HR: 55 (09-19-23 @ 04:24) (55 - 70)  BP: 142/69 (09-19-23 @ 04:24) (136/73 - 142/69)  RR: 18 (09-19-23 @ 04:24) (16 - 18)  SpO2: 95% (09-19-23 @ 04:24) (95% - 99%)  Wt(kg): --  Daily     Daily       Physical Exam:  Gen: NAD  HEENT: EOMI  CV: RRR, normal S1 + S2, no m/r/g  Lungs: CTAB  Abd: soft, non-tender  Ext: No edema    Telemetry:    Laboratory Data:                        10.9   10.00 )-----------( 322      ( 19 Sep 2023 06:58 )             33.6     09-19    127<L>  |  95<L>  |  24<H>  ----------------------------<  124<H>  4.1   |  18<L>  |  1.16    Ca    9.4      19 Sep 2023 06:59                Inpatient Medications:  MEDICATIONS  (STANDING):  allopurinol 300 milliGRAM(s) Oral daily  amLODIPine   Tablet 5 milliGRAM(s) Oral daily  ascorbic acid 500 milliGRAM(s) Oral daily  aspirin 325 milliGRAM(s) Oral daily  atorvastatin 40 milliGRAM(s) Oral at bedtime  dextrose 5%. 1000 milliLiter(s) (100 mL/Hr) IV Continuous <Continuous>  dextrose 5%. 1000 milliLiter(s) (50 mL/Hr) IV Continuous <Continuous>  dextrose 50% Injectable 25 Gram(s) IV Push once  dextrose 50% Injectable 12.5 Gram(s) IV Push once  dextrose 50% Injectable 25 Gram(s) IV Push once  enoxaparin Injectable 30 milliGRAM(s) SubCutaneous every 24 hours  ergocalciferol 65772 Unit(s) Oral every week  furosemide    Tablet 20 milliGRAM(s) Oral daily  glucagon  Injectable 1 milliGRAM(s) IntraMuscular once  insulin lispro (ADMELOG) corrective regimen sliding scale   SubCutaneous three times a day before meals  insulin lispro (ADMELOG) corrective regimen sliding scale   SubCutaneous at bedtime  levothyroxine 50 MICROGram(s) Oral <User Schedule>  levothyroxine 75 MICROGram(s) Oral <User Schedule>  metoprolol tartrate 25 milliGRAM(s) Oral at bedtime  multivitamin 1 Tablet(s) Oral daily  sodium bicarbonate 650 milliGRAM(s) Oral three times a day      Assessment:  73-year-old female with history of HTN, CAD, carotid artery disease, DM, hypothyroid, and recent atraumatic right pelvic fracture (unclear exact fracture per patient but seems to indicate pubic rami) who presents with worsening right lower back pain which began approximately 4 to 5 days ago.  Patient states pain was mild initially at onset has been constant worsening since onset and denies any trauma or specific eliciting event.  No fever.  No numbness or weakness in extremities.  No loss of control of bowels or bladder.  No urinary symptoms no diarrhea.  No abdominal pain.  No nausea or vomiting. Further history from patient had right pelvic fracture which is likely his pubic rami though have no imaging in EMR and unclear history per patient.  Per her history no trauma or falls.    Recs:  cardiac stable  no e/o acs or chf  cw asa and cont with statin give hx of cad/pad  cw antihypertensives   diuretics per renal  pain control  ortho/nsgy eval - conservative management  s/p mri spine, results noted  dcp  to paco        Over 25 minutes spent on total encounter; more than 50% of the visit was spent counseling and/or coordinating care by the attending physician.      Carlos Diego MD   Cardiovascular Disease  [phone] Cardiovascular Disease Progress Note    Overnight events: No acute events overnight.  no new cardiac sx  Otherwise review of systems negative    Objective Findings:  T(C): 36.6 (09-19-23 @ 04:24), Max: 36.8 (09-18-23 @ 22:35)  HR: 55 (09-19-23 @ 04:24) (55 - 70)  BP: 142/69 (09-19-23 @ 04:24) (136/73 - 142/69)  RR: 18 (09-19-23 @ 04:24) (16 - 18)  SpO2: 95% (09-19-23 @ 04:24) (95% - 99%)  Wt(kg): --  Daily     Daily       Physical Exam:  Gen: NAD  HEENT: EOMI  CV: RRR, normal S1 + S2, no m/r/g  Lungs: CTAB  Abd: soft, non-tender  Ext: No edema    Telemetry:    Laboratory Data:                        10.9   10.00 )-----------( 322      ( 19 Sep 2023 06:58 )             33.6     09-19    127<L>  |  95<L>  |  24<H>  ----------------------------<  124<H>  4.1   |  18<L>  |  1.16    Ca    9.4      19 Sep 2023 06:59                Inpatient Medications:  MEDICATIONS  (STANDING):  allopurinol 300 milliGRAM(s) Oral daily  amLODIPine   Tablet 5 milliGRAM(s) Oral daily  ascorbic acid 500 milliGRAM(s) Oral daily  aspirin 325 milliGRAM(s) Oral daily  atorvastatin 40 milliGRAM(s) Oral at bedtime  dextrose 5%. 1000 milliLiter(s) (100 mL/Hr) IV Continuous <Continuous>  dextrose 5%. 1000 milliLiter(s) (50 mL/Hr) IV Continuous <Continuous>  dextrose 50% Injectable 25 Gram(s) IV Push once  dextrose 50% Injectable 12.5 Gram(s) IV Push once  dextrose 50% Injectable 25 Gram(s) IV Push once  enoxaparin Injectable 30 milliGRAM(s) SubCutaneous every 24 hours  ergocalciferol 11434 Unit(s) Oral every week  furosemide    Tablet 20 milliGRAM(s) Oral daily  glucagon  Injectable 1 milliGRAM(s) IntraMuscular once  insulin lispro (ADMELOG) corrective regimen sliding scale   SubCutaneous three times a day before meals  insulin lispro (ADMELOG) corrective regimen sliding scale   SubCutaneous at bedtime  levothyroxine 50 MICROGram(s) Oral <User Schedule>  levothyroxine 75 MICROGram(s) Oral <User Schedule>  metoprolol tartrate 25 milliGRAM(s) Oral at bedtime  multivitamin 1 Tablet(s) Oral daily  sodium bicarbonate 650 milliGRAM(s) Oral three times a day      Assessment:  73-year-old female with history of HTN, CAD, carotid artery disease, DM, hypothyroid, and recent atraumatic right pelvic fracture (unclear exact fracture per patient but seems to indicate pubic rami) who presents with worsening right lower back pain which began approximately 4 to 5 days ago.  Patient states pain was mild initially at onset has been constant worsening since onset and denies any trauma or specific eliciting event.  No fever.  No numbness or weakness in extremities.  No loss of control of bowels or bladder.  No urinary symptoms no diarrhea.  No abdominal pain.  No nausea or vomiting. Further history from patient had right pelvic fracture which is likely his pubic rami though have no imaging in EMR and unclear history per patient.  Per her history no trauma or falls.    Recs:  cardiac stable  no e/o acs or chf  cw asa and cont with statin give hx of cad/pad  cw antihypertensives   diuretics per renal  pain control  ortho/nsgy eval - conservative management  s/p mri spine, results noted  dcp  to paco        Over 25 minutes spent on total encounter; more than 50% of the visit was spent counseling and/or coordinating care by the attending physician.      Carlos Diego MD   Cardiovascular Disease  [phone]

## 2023-09-19 NOTE — PROGRESS NOTE ADULT - SUBJECTIVE AND OBJECTIVE BOX
[facility]   [phone]  RENAL FOLLOW UP NOTE  --------------------------------------------------------------------------------  Chief Complaint:    24 hour events/subjective:    patient seen and examined.   denies nausea  states pain is manageable  also states she is not drinking more than 1L in a day  denies diarrhea    PAST HISTORY  --------------------------------------------------------------------------------  No significant changes to PMH, PSH, FHx, SHx, unless otherwise noted    ALLERGIES & MEDICATIONS  --------------------------------------------------------------------------------  Allergies    penicillin (Unknown)    Intolerances      Standing Inpatient Medications  allopurinol 300 milliGRAM(s) Oral daily  amLODIPine   Tablet 5 milliGRAM(s) Oral daily  ascorbic acid 500 milliGRAM(s) Oral daily  aspirin 325 milliGRAM(s) Oral daily  atorvastatin 40 milliGRAM(s) Oral at bedtime  dextrose 5%. 1000 milliLiter(s) IV Continuous <Continuous>  dextrose 5%. 1000 milliLiter(s) IV Continuous <Continuous>  dextrose 50% Injectable 25 Gram(s) IV Push once  dextrose 50% Injectable 25 Gram(s) IV Push once  dextrose 50% Injectable 12.5 Gram(s) IV Push once  enoxaparin Injectable 30 milliGRAM(s) SubCutaneous every 24 hours  ergocalciferol 00541 Unit(s) Oral every week  furosemide    Tablet 20 milliGRAM(s) Oral daily  glucagon  Injectable 1 milliGRAM(s) IntraMuscular once  insulin lispro (ADMELOG) corrective regimen sliding scale   SubCutaneous three times a day before meals  insulin lispro (ADMELOG) corrective regimen sliding scale   SubCutaneous at bedtime  levothyroxine 75 MICROGram(s) Oral <User Schedule>  levothyroxine 50 MICROGram(s) Oral <User Schedule>  metoprolol tartrate 25 milliGRAM(s) Oral at bedtime  multivitamin 1 Tablet(s) Oral daily  sodium bicarbonate 650 milliGRAM(s) Oral three times a day    PRN Inpatient Medications  acetaminophen     Tablet .. 650 milliGRAM(s) Oral every 6 hours PRN  dextrose Oral Gel 15 Gram(s) Oral once PRN      REVIEW OF SYSTEMS  --------------------------------------------------------------------------------    Gen: denies fevers/chills,  CVS: denies chest pain/palpitations  Resp: denies SOB/Cough  GI: Denies N/V/Abd pain  : Denies dysuria/oliguria/hematuria    VITALS/PHYSICAL EXAM  --------------------------------------------------------------------------------  T(C): 36.6 (09-19-23 @ 04:24), Max: 36.8 (09-18-23 @ 22:35)  HR: 55 (09-19-23 @ 04:24) (55 - 70)  BP: 142/69 (09-19-23 @ 04:24) (136/73 - 142/69)  RR: 18 (09-19-23 @ 04:24) (16 - 18)  SpO2: 95% (09-19-23 @ 04:24) (95% - 99%)  Wt(kg): --        09-18-23 @ 07:01  -  09-19-23 @ 07:00  --------------------------------------------------------  IN: 960 mL / OUT: 0 mL / NET: 960 mL      Physical Exam:  	              Gen: Non toxic comfortable appearing   	Pulm:  no rales or ronchi or wheezing  	CV: No JVD. RRR, S1S2; no rub  	Abd: +BS, soft, nontender/softly distended  	: No suprapubic tenderness  	UE: Warm, no cyanosis  no clubbing,  no edema;  	LE: Warm, no cyanosis  no clubbing, no edema    LABS/STUDIES  --------------------------------------------------------------------------------              10.9   10.00 >-----------<  322      [09-19-23 @ 06:58]              33.6     127  |  95  |  24  ----------------------------<  124      [09-19-23 @ 06:59]  4.1   |  18  |  1.16        Ca     9.4     [09-19-23 @ 06:59]            Creatinine Trend:  SCr 1.16 [09-19 @ 06:59]  SCr 1.13 [09-18 @ 07:00]  SCr 1.16 [09-17 @ 07:01]  SCr 1.10 [09-16 @ 06:38]  SCr 1.07 [09-15 @ 12:49]            Urine Sodium 86      [09-13-23 @ 18:44]  Urine Osmolality 301      [09-13-23 @ 18:44]    PTH -- (Ca 9.7)      [09-08-23 @ 06:57]   35  Vitamin D (25OH) 19.0      [09-17-23 @ 07:06]  TSH 3.78      [09-08-23 @ 06:57]       [facility]   [phone]  RENAL FOLLOW UP NOTE  --------------------------------------------------------------------------------  Chief Complaint:    24 hour events/subjective:    patient seen and examined.   denies nausea  states pain is manageable  also states she is not drinking more than 1L in a day  denies diarrhea    PAST HISTORY  --------------------------------------------------------------------------------  No significant changes to PMH, PSH, FHx, SHx, unless otherwise noted    ALLERGIES & MEDICATIONS  --------------------------------------------------------------------------------  Allergies    penicillin (Unknown)    Intolerances      Standing Inpatient Medications  allopurinol 300 milliGRAM(s) Oral daily  amLODIPine   Tablet 5 milliGRAM(s) Oral daily  ascorbic acid 500 milliGRAM(s) Oral daily  aspirin 325 milliGRAM(s) Oral daily  atorvastatin 40 milliGRAM(s) Oral at bedtime  dextrose 5%. 1000 milliLiter(s) IV Continuous <Continuous>  dextrose 5%. 1000 milliLiter(s) IV Continuous <Continuous>  dextrose 50% Injectable 25 Gram(s) IV Push once  dextrose 50% Injectable 25 Gram(s) IV Push once  dextrose 50% Injectable 12.5 Gram(s) IV Push once  enoxaparin Injectable 30 milliGRAM(s) SubCutaneous every 24 hours  ergocalciferol 54008 Unit(s) Oral every week  furosemide    Tablet 20 milliGRAM(s) Oral daily  glucagon  Injectable 1 milliGRAM(s) IntraMuscular once  insulin lispro (ADMELOG) corrective regimen sliding scale   SubCutaneous three times a day before meals  insulin lispro (ADMELOG) corrective regimen sliding scale   SubCutaneous at bedtime  levothyroxine 75 MICROGram(s) Oral <User Schedule>  levothyroxine 50 MICROGram(s) Oral <User Schedule>  metoprolol tartrate 25 milliGRAM(s) Oral at bedtime  multivitamin 1 Tablet(s) Oral daily  sodium bicarbonate 650 milliGRAM(s) Oral three times a day    PRN Inpatient Medications  acetaminophen     Tablet .. 650 milliGRAM(s) Oral every 6 hours PRN  dextrose Oral Gel 15 Gram(s) Oral once PRN      REVIEW OF SYSTEMS  --------------------------------------------------------------------------------    Gen: denies fevers/chills,  CVS: denies chest pain/palpitations  Resp: denies SOB/Cough  GI: Denies N/V/Abd pain  : Denies dysuria/oliguria/hematuria    VITALS/PHYSICAL EXAM  --------------------------------------------------------------------------------  T(C): 36.6 (09-19-23 @ 04:24), Max: 36.8 (09-18-23 @ 22:35)  HR: 55 (09-19-23 @ 04:24) (55 - 70)  BP: 142/69 (09-19-23 @ 04:24) (136/73 - 142/69)  RR: 18 (09-19-23 @ 04:24) (16 - 18)  SpO2: 95% (09-19-23 @ 04:24) (95% - 99%)  Wt(kg): --        09-18-23 @ 07:01  -  09-19-23 @ 07:00  --------------------------------------------------------  IN: 960 mL / OUT: 0 mL / NET: 960 mL      Physical Exam:  	              Gen: Non toxic comfortable appearing   	Pulm:  no rales or ronchi or wheezing  	CV: No JVD. RRR, S1S2; no rub  	Abd: +BS, soft, nontender/softly distended  	: No suprapubic tenderness  	UE: Warm, no cyanosis  no clubbing,  no edema;  	LE: Warm, no cyanosis  no clubbing, no edema    LABS/STUDIES  --------------------------------------------------------------------------------              10.9   10.00 >-----------<  322      [09-19-23 @ 06:58]              33.6     127  |  95  |  24  ----------------------------<  124      [09-19-23 @ 06:59]  4.1   |  18  |  1.16        Ca     9.4     [09-19-23 @ 06:59]            Creatinine Trend:  SCr 1.16 [09-19 @ 06:59]  SCr 1.13 [09-18 @ 07:00]  SCr 1.16 [09-17 @ 07:01]  SCr 1.10 [09-16 @ 06:38]  SCr 1.07 [09-15 @ 12:49]            Urine Sodium 86      [09-13-23 @ 18:44]  Urine Osmolality 301      [09-13-23 @ 18:44]    PTH -- (Ca 9.7)      [09-08-23 @ 06:57]   35  Vitamin D (25OH) 19.0      [09-17-23 @ 07:06]  TSH 3.78      [09-08-23 @ 06:57]       [facility]   [phone]  RENAL FOLLOW UP NOTE  --------------------------------------------------------------------------------  Chief Complaint:    24 hour events/subjective:    patient seen and examined.   denies nausea  states pain is manageable  also states she is not drinking more than 1L in a day  denies diarrhea    PAST HISTORY  --------------------------------------------------------------------------------  No significant changes to PMH, PSH, FHx, SHx, unless otherwise noted    ALLERGIES & MEDICATIONS  --------------------------------------------------------------------------------  Allergies    penicillin (Unknown)    Intolerances      Standing Inpatient Medications  allopurinol 300 milliGRAM(s) Oral daily  amLODIPine   Tablet 5 milliGRAM(s) Oral daily  ascorbic acid 500 milliGRAM(s) Oral daily  aspirin 325 milliGRAM(s) Oral daily  atorvastatin 40 milliGRAM(s) Oral at bedtime  dextrose 5%. 1000 milliLiter(s) IV Continuous <Continuous>  dextrose 5%. 1000 milliLiter(s) IV Continuous <Continuous>  dextrose 50% Injectable 25 Gram(s) IV Push once  dextrose 50% Injectable 25 Gram(s) IV Push once  dextrose 50% Injectable 12.5 Gram(s) IV Push once  enoxaparin Injectable 30 milliGRAM(s) SubCutaneous every 24 hours  ergocalciferol 29163 Unit(s) Oral every week  furosemide    Tablet 20 milliGRAM(s) Oral daily  glucagon  Injectable 1 milliGRAM(s) IntraMuscular once  insulin lispro (ADMELOG) corrective regimen sliding scale   SubCutaneous three times a day before meals  insulin lispro (ADMELOG) corrective regimen sliding scale   SubCutaneous at bedtime  levothyroxine 75 MICROGram(s) Oral <User Schedule>  levothyroxine 50 MICROGram(s) Oral <User Schedule>  metoprolol tartrate 25 milliGRAM(s) Oral at bedtime  multivitamin 1 Tablet(s) Oral daily  sodium bicarbonate 650 milliGRAM(s) Oral three times a day    PRN Inpatient Medications  acetaminophen     Tablet .. 650 milliGRAM(s) Oral every 6 hours PRN  dextrose Oral Gel 15 Gram(s) Oral once PRN      REVIEW OF SYSTEMS  --------------------------------------------------------------------------------    Gen: denies fevers/chills,  CVS: denies chest pain/palpitations  Resp: denies SOB/Cough  GI: Denies N/V/Abd pain  : Denies dysuria/oliguria/hematuria    VITALS/PHYSICAL EXAM  --------------------------------------------------------------------------------  T(C): 36.6 (09-19-23 @ 04:24), Max: 36.8 (09-18-23 @ 22:35)  HR: 55 (09-19-23 @ 04:24) (55 - 70)  BP: 142/69 (09-19-23 @ 04:24) (136/73 - 142/69)  RR: 18 (09-19-23 @ 04:24) (16 - 18)  SpO2: 95% (09-19-23 @ 04:24) (95% - 99%)  Wt(kg): --        09-18-23 @ 07:01  -  09-19-23 @ 07:00  --------------------------------------------------------  IN: 960 mL / OUT: 0 mL / NET: 960 mL      Physical Exam:  	              Gen: Non toxic comfortable appearing   	Pulm:  no rales or ronchi or wheezing  	CV: No JVD. RRR, S1S2; no rub  	Abd: +BS, soft, nontender/softly distended  	: No suprapubic tenderness  	UE: Warm, no cyanosis  no clubbing,  no edema;  	LE: Warm, no cyanosis  no clubbing, no edema    LABS/STUDIES  --------------------------------------------------------------------------------              10.9   10.00 >-----------<  322      [09-19-23 @ 06:58]              33.6     127  |  95  |  24  ----------------------------<  124      [09-19-23 @ 06:59]  4.1   |  18  |  1.16        Ca     9.4     [09-19-23 @ 06:59]            Creatinine Trend:  SCr 1.16 [09-19 @ 06:59]  SCr 1.13 [09-18 @ 07:00]  SCr 1.16 [09-17 @ 07:01]  SCr 1.10 [09-16 @ 06:38]  SCr 1.07 [09-15 @ 12:49]            Urine Sodium 86      [09-13-23 @ 18:44]  Urine Osmolality 301      [09-13-23 @ 18:44]    PTH -- (Ca 9.7)      [09-08-23 @ 06:57]   35  Vitamin D (25OH) 19.0      [09-17-23 @ 07:06]  TSH 3.78      [09-08-23 @ 06:57]

## 2023-09-19 NOTE — PROGRESS NOTE ADULT - ASSESSMENT
73-year-old female with history of HTN, CAD and unclear open heart surgery (per patient may be CABG, on  daily), DM, hypothyroid, and recent atraumatic right pelvic fracture  who presents with worsening right lower back pain      1- hyponatremia  2- ckd III   3- acidosis   4- HTN   5- hx chf      creatinine is better   sodium still not improving despite sodium bicarb tabs and resuming lasix  1L po fluid restriction  acidosis, trend bicarb  will discontinue sodium bicarb tabs and discontinue lasix (received 20 mg today)  we will plan to give samsca tomorrow am for hyponatremia. We will order pending repeat sodium tomorrow.  pain control  cont norvasc 5 mg daily  trend bmp in am

## 2023-09-19 NOTE — PROGRESS NOTE ADULT - ASSESSMENT
73 f with    Back pain/ Sacral fracture  - pain control  - MRI LS spine noted  - CT pelvis noted  - PT  - Neurosurgery evaluation noted. No intervention     Hypercalcemia  - follow    Vit D deficiency  - supplement    CAD (coronary artery disease)   - stable  - Cardiology follow dr Diego     Diabetes mellitus   - ADA diet  - BS control    H/O pericardiotomy   - Echo    History of gout   - continue Rx  - Uric acid    Hyperlipidemia   - stable    Hypertension   - control    Hypothyroidism   - supplement  - follow TSH    JONATHAN improving   - follow Cr, lytes  - restart Lasix 20 mg.     Hyponatremia  - Nephrology follow Dr. Avery Apodaca in am    DVT prophylaxis     DCP in progress when Nephrology cleared.     Guido Rolon MD phone [phone]      73 f with    Back pain/ Sacral fracture  - pain control  - MRI LS spine noted  - CT pelvis noted  - PT  - Neurosurgery evaluation noted. No intervention     Hypercalcemia  - follow    Vit D deficiency  - supplement    CAD (coronary artery disease)   - stable  - Cardiology follow dr Diego     Diabetes mellitus   - ADA diet  - BS control    H/O pericardiotomy   - Echo    History of gout   - continue Rx  - Uric acid    Hyperlipidemia   - stable    Hypertension   - control    Hypothyroidism   - supplement  - follow TSH    JONATHAN improving   - follow Cr, lytes  - restart Lasix 20 mg.     Hyponatremia  - Nephrology follow Dr. Avery Apodaca in am    DVT prophylaxis     DCP in progress when Nephrology cleared.     Guido Rolon MD phone [phone]      73 f with    Back pain/ Sacral fracture  - pain control  - MRI LS spine noted  - CT pelvis noted  - PT  - Neurosurgery evaluation noted. No intervention     Hypercalcemia  - follow    Vit D deficiency  - supplement    CAD (coronary artery disease)   - stable  - Cardiology follow dr Diego     Diabetes mellitus   - ADA diet  - BS control    H/O pericardiotomy   - Echo    History of gout   - continue Rx  - Uric acid    Hyperlipidemia   - stable    Hypertension   - control    Hypothyroidism   - supplement  - follow TSH    JONATHAN improving   - follow Cr, lytes  - restart Lasix 20 mg.     Hyponatremia  - Nephrology follow Dr. Avery Apodaca in am    DVT prophylaxis     DCP in progress when Nephrology cleared.     Guido Rolon MD phone [phone]

## 2023-09-19 NOTE — PROGRESS NOTE ADULT - NUTRITIONAL ASSESSMENT
This patient has been assessed with a concern for Malnutrition and has been determined to have a diagnosis/diagnoses of Severe protein-calorie malnutrition.    This patient is being managed with:   Diet Regular-  Consistent Carbohydrate {Evening Snack} (CSTCHOSN)  1000mL Fluid Restriction ([id number])  Entered: Sep 12 2023 12:26PM   This patient has been assessed with a concern for Malnutrition and has been determined to have a diagnosis/diagnoses of Severe protein-calorie malnutrition.    This patient is being managed with:   Diet Regular-  Consistent Carbohydrate {Evening Snack} (CSTCHOSN)  1000mL Fluid Restriction ([id number])  Entered: Sep 12 2023 12:26PM   This patient has been assessed with a concern for Malnutrition and has been determined to have a diagnosis/diagnoses of Severe protein-calorie malnutrition.    This patient is being managed with:   Diet Regular-  Consistent Carbohydrate {Evening Snack} (CSTCHOSN)  1000mL Fluid Restriction ([id number])  Entered: Sep 12 2023 12:26PM

## 2023-09-20 LAB
ANION GAP SERPL CALC-SCNC: 14 MMOL/L — SIGNIFICANT CHANGE UP (ref 5–17)
ANION GAP SERPL CALC-SCNC: 15 MMOL/L — SIGNIFICANT CHANGE UP (ref 5–17)
BUN SERPL-MCNC: 26 MG/DL — HIGH (ref 7–23)
BUN SERPL-MCNC: 27 MG/DL — HIGH (ref 7–23)
CALCIUM SERPL-MCNC: 9.4 MG/DL — SIGNIFICANT CHANGE UP (ref 8.4–10.5)
CHLORIDE SERPL-SCNC: 94 MMOL/L — LOW (ref 96–108)
CHLORIDE SERPL-SCNC: 96 MMOL/L — SIGNIFICANT CHANGE UP (ref 96–108)
CO2 SERPL-SCNC: 19 MMOL/L — LOW (ref 22–31)
CO2 SERPL-SCNC: 20 MMOL/L — LOW (ref 22–31)
CREAT SERPL-MCNC: 1.12 MG/DL — SIGNIFICANT CHANGE UP (ref 0.5–1.3)
CREAT SERPL-MCNC: 1.24 MG/DL — SIGNIFICANT CHANGE UP (ref 0.5–1.3)
EGFR: 45 ML/MIN/1.73M2 — LOW
EGFR: 51 ML/MIN/1.73M2 — LOW
GLUCOSE BLDC GLUCOMTR-MCNC: 126 MG/DL — HIGH (ref 70–99)
GLUCOSE BLDC GLUCOMTR-MCNC: 132 MG/DL — HIGH (ref 70–99)
GLUCOSE BLDC GLUCOMTR-MCNC: 183 MG/DL — HIGH (ref 70–99)
GLUCOSE BLDC GLUCOMTR-MCNC: 233 MG/DL — HIGH (ref 70–99)
GLUCOSE SERPL-MCNC: 122 MG/DL — HIGH (ref 70–99)
GLUCOSE SERPL-MCNC: 123 MG/DL — HIGH (ref 70–99)
POTASSIUM SERPL-MCNC: 4.2 MMOL/L — SIGNIFICANT CHANGE UP (ref 3.5–5.3)
POTASSIUM SERPL-MCNC: 4.5 MMOL/L — SIGNIFICANT CHANGE UP (ref 3.5–5.3)
POTASSIUM SERPL-SCNC: 4.2 MMOL/L — SIGNIFICANT CHANGE UP (ref 3.5–5.3)
POTASSIUM SERPL-SCNC: 4.5 MMOL/L — SIGNIFICANT CHANGE UP (ref 3.5–5.3)
SODIUM SERPL-SCNC: 128 MMOL/L — LOW (ref 135–145)
SODIUM SERPL-SCNC: 130 MMOL/L — LOW (ref 135–145)

## 2023-09-20 RX ORDER — TOLVAPTAN 15 MG/1
7.5 TABLET ORAL ONCE
Refills: 0 | Status: COMPLETED | OUTPATIENT
Start: 2023-09-20 | End: 2023-09-20

## 2023-09-20 RX ADMIN — Medication 325 MILLIGRAM(S): at 12:13

## 2023-09-20 RX ADMIN — ATORVASTATIN CALCIUM 40 MILLIGRAM(S): 80 TABLET, FILM COATED ORAL at 21:13

## 2023-09-20 RX ADMIN — Medication 25 MILLIGRAM(S): at 21:13

## 2023-09-20 RX ADMIN — ENOXAPARIN SODIUM 30 MILLIGRAM(S): 100 INJECTION SUBCUTANEOUS at 05:27

## 2023-09-20 RX ADMIN — TOLVAPTAN 7.5 MILLIGRAM(S): 15 TABLET ORAL at 09:12

## 2023-09-20 RX ADMIN — Medication 1 TABLET(S): at 12:14

## 2023-09-20 RX ADMIN — AMLODIPINE BESYLATE 5 MILLIGRAM(S): 2.5 TABLET ORAL at 05:27

## 2023-09-20 RX ADMIN — Medication 4: at 13:28

## 2023-09-20 RX ADMIN — Medication 75 MICROGRAM(S): at 08:49

## 2023-09-20 RX ADMIN — Medication 300 MILLIGRAM(S): at 12:14

## 2023-09-20 RX ADMIN — Medication 500 MILLIGRAM(S): at 12:14

## 2023-09-20 NOTE — PROGRESS NOTE ADULT - NUTRITIONAL ASSESSMENT
This patient has been assessed with a concern for Malnutrition and has been determined to have a diagnosis/diagnoses of Severe protein-calorie malnutrition.    This patient is being managed with:   Diet Regular-  Consistent Carbohydrate {Evening Snack} (CSTCHOSN)  1000mL Fluid Restriction ([id number])  Entered: Sep 12 2023 12:26PM   This patient has been assessed with a concern for Malnutrition and has been determined to have a diagnosis/diagnoses of Severe protein-calorie malnutrition.    This patient is being managed with:   Diet Regular-  Consistent Carbohydrate {Evening Snack} (CSTCHOSN)  1000mL Fluid Restriction ([id number])  Entered: Sep 12 2023 12:26PM   This patient has been assessed with a concern for Malnutrition and has been determined to have a diagnosis/diagnoses of Severe protein-calorie malnutrition.    This patient is being managed with:   Diet Regular-  Consistent Carbohydrate {Evening Snack} (CSTCHOSN)  1000mL Fluid Restriction ([id number])  Entered: Sep 12 2023 12:26PM

## 2023-09-20 NOTE — PROGRESS NOTE ADULT - SUBJECTIVE AND OBJECTIVE BOX
Patient is a 73y old  Female who presents with a chief complaint of Pt is a 73-year-old female with history of HTN, CAD and unclear open heart surgery (per patient may be CABG, on  daily), DM, hypothyroid, and recent atraumatic right pelvic fracture (unclear exact fracture per patient but seems to indicate pubic rami) who presents with worsening right lower back pain     (10 Sep 2023 10:59)      SUBJECTIVE / OVERNIGHT EVENTS: No new complaints.   Review of Systems  chest pain no  palpitations no  sob no  nausea no  headache no    MEDICATIONS  (STANDING):  allopurinol 300 milliGRAM(s) Oral daily  ascorbic acid 500 milliGRAM(s) Oral daily  aspirin 325 milliGRAM(s) Oral daily  atorvastatin 40 milliGRAM(s) Oral at bedtime  dextrose 5%. 1000 milliLiter(s) (100 mL/Hr) IV Continuous <Continuous>  dextrose 5%. 1000 milliLiter(s) (50 mL/Hr) IV Continuous <Continuous>  dextrose 50% Injectable 25 Gram(s) IV Push once  dextrose 50% Injectable 12.5 Gram(s) IV Push once  dextrose 50% Injectable 25 Gram(s) IV Push once  enoxaparin Injectable 30 milliGRAM(s) SubCutaneous every 24 hours  ergocalciferol 04346 Unit(s) Oral every week  glucagon  Injectable 1 milliGRAM(s) IntraMuscular once  insulin lispro (ADMELOG) corrective regimen sliding scale   SubCutaneous three times a day before meals  insulin lispro (ADMELOG) corrective regimen sliding scale   SubCutaneous at bedtime  levothyroxine 75 MICROGram(s) Oral <User Schedule>  levothyroxine 50 MICROGram(s) Oral <User Schedule>  metoprolol tartrate 25 milliGRAM(s) Oral at bedtime  multivitamin 1 Tablet(s) Oral daily    MEDICATIONS  (PRN):  acetaminophen     Tablet .. 650 milliGRAM(s) Oral every 6 hours PRN Temp greater or equal to 38.5C (101.3F), Mild Pain (1 - 3)  dextrose Oral Gel 15 Gram(s) Oral once PRN Blood Glucose LESS THAN 70 milliGRAM(s)/deciliter      Vital Signs Last 24 Hrs  T(C): 37 (20 Sep 2023 11:46), Max: 37.1 (19 Sep 2023 20:36)  T(F): 98.6 (20 Sep 2023 11:46), Max: 98.7 (19 Sep 2023 20:36)  HR: 74 (20 Sep 2023 11:46) (61 - 74)  BP: 147/56 (20 Sep 2023 11:46) (127/69 - 147/56)  BP(mean): --  RR: 18 (20 Sep 2023 11:46) (18 - 18)  SpO2: 98% (20 Sep 2023 11:46) (96% - 98%)    Parameters below as of 20 Sep 2023 11:46  Patient On (Oxygen Delivery Method): room air        PHYSICAL EXAM:  GENERAL: NAD, well-developed  HEAD:  Atraumatic, Normocephalic  EYES: EOMI, PERRLA, conjunctiva and sclera clear  NECK: Supple, No JVD  CHEST/LUNG: Clear to auscultation bilaterally; No wheeze  HEART: Regular rate and rhythm; No murmurs, rubs, or gallops  ABDOMEN: Soft, Nontender, Nondistended; Bowel sounds present  EXTREMITIES:  2+ Peripheral Pulses, No clubbing, cyanosis, or edema  PSYCH: AAOx3  NEUROLOGY: non-focal  SKIN: No rashes or lesions    LABS:                        10.9   10.00 )-----------( 322      ( 19 Sep 2023 06:58 )             33.6     09-20    128<L>  |  94<L>  |  26<H>  ----------------------------<  123<H>  4.5   |  20<L>  |  1.24    Ca    9.4      20 Sep 2023 07:06            Urinalysis Basic - ( 20 Sep 2023 07:06 )    Color: x / Appearance: x / SG: x / pH: x  Gluc: 123 mg/dL / Ketone: x  / Bili: x / Urobili: x   Blood: x / Protein: x / Nitrite: x   Leuk Esterase: x / RBC: x / WBC x   Sq Epi: x / Non Sq Epi: x / Bacteria: x          RADIOLOGY & ADDITIONAL TESTS:    Imaging Personally Reviewed:    Consultant(s) Notes Reviewed:      Care Discussed with Consultants/Other Providers:   Patient is a 73y old  Female who presents with a chief complaint of Pt is a 73-year-old female with history of HTN, CAD and unclear open heart surgery (per patient may be CABG, on  daily), DM, hypothyroid, and recent atraumatic right pelvic fracture (unclear exact fracture per patient but seems to indicate pubic rami) who presents with worsening right lower back pain     (10 Sep 2023 10:59)      SUBJECTIVE / OVERNIGHT EVENTS: No new complaints.   Review of Systems  chest pain no  palpitations no  sob no  nausea no  headache no    MEDICATIONS  (STANDING):  allopurinol 300 milliGRAM(s) Oral daily  ascorbic acid 500 milliGRAM(s) Oral daily  aspirin 325 milliGRAM(s) Oral daily  atorvastatin 40 milliGRAM(s) Oral at bedtime  dextrose 5%. 1000 milliLiter(s) (100 mL/Hr) IV Continuous <Continuous>  dextrose 5%. 1000 milliLiter(s) (50 mL/Hr) IV Continuous <Continuous>  dextrose 50% Injectable 25 Gram(s) IV Push once  dextrose 50% Injectable 12.5 Gram(s) IV Push once  dextrose 50% Injectable 25 Gram(s) IV Push once  enoxaparin Injectable 30 milliGRAM(s) SubCutaneous every 24 hours  ergocalciferol 31120 Unit(s) Oral every week  glucagon  Injectable 1 milliGRAM(s) IntraMuscular once  insulin lispro (ADMELOG) corrective regimen sliding scale   SubCutaneous three times a day before meals  insulin lispro (ADMELOG) corrective regimen sliding scale   SubCutaneous at bedtime  levothyroxine 75 MICROGram(s) Oral <User Schedule>  levothyroxine 50 MICROGram(s) Oral <User Schedule>  metoprolol tartrate 25 milliGRAM(s) Oral at bedtime  multivitamin 1 Tablet(s) Oral daily    MEDICATIONS  (PRN):  acetaminophen     Tablet .. 650 milliGRAM(s) Oral every 6 hours PRN Temp greater or equal to 38.5C (101.3F), Mild Pain (1 - 3)  dextrose Oral Gel 15 Gram(s) Oral once PRN Blood Glucose LESS THAN 70 milliGRAM(s)/deciliter      Vital Signs Last 24 Hrs  T(C): 37 (20 Sep 2023 11:46), Max: 37.1 (19 Sep 2023 20:36)  T(F): 98.6 (20 Sep 2023 11:46), Max: 98.7 (19 Sep 2023 20:36)  HR: 74 (20 Sep 2023 11:46) (61 - 74)  BP: 147/56 (20 Sep 2023 11:46) (127/69 - 147/56)  BP(mean): --  RR: 18 (20 Sep 2023 11:46) (18 - 18)  SpO2: 98% (20 Sep 2023 11:46) (96% - 98%)    Parameters below as of 20 Sep 2023 11:46  Patient On (Oxygen Delivery Method): room air        PHYSICAL EXAM:  GENERAL: NAD, well-developed  HEAD:  Atraumatic, Normocephalic  EYES: EOMI, PERRLA, conjunctiva and sclera clear  NECK: Supple, No JVD  CHEST/LUNG: Clear to auscultation bilaterally; No wheeze  HEART: Regular rate and rhythm; No murmurs, rubs, or gallops  ABDOMEN: Soft, Nontender, Nondistended; Bowel sounds present  EXTREMITIES:  2+ Peripheral Pulses, No clubbing, cyanosis, or edema  PSYCH: AAOx3  NEUROLOGY: non-focal  SKIN: No rashes or lesions    LABS:                        10.9   10.00 )-----------( 322      ( 19 Sep 2023 06:58 )             33.6     09-20    128<L>  |  94<L>  |  26<H>  ----------------------------<  123<H>  4.5   |  20<L>  |  1.24    Ca    9.4      20 Sep 2023 07:06            Urinalysis Basic - ( 20 Sep 2023 07:06 )    Color: x / Appearance: x / SG: x / pH: x  Gluc: 123 mg/dL / Ketone: x  / Bili: x / Urobili: x   Blood: x / Protein: x / Nitrite: x   Leuk Esterase: x / RBC: x / WBC x   Sq Epi: x / Non Sq Epi: x / Bacteria: x          RADIOLOGY & ADDITIONAL TESTS:    Imaging Personally Reviewed:    Consultant(s) Notes Reviewed:      Care Discussed with Consultants/Other Providers:   Patient is a 73y old  Female who presents with a chief complaint of Pt is a 73-year-old female with history of HTN, CAD and unclear open heart surgery (per patient may be CABG, on  daily), DM, hypothyroid, and recent atraumatic right pelvic fracture (unclear exact fracture per patient but seems to indicate pubic rami) who presents with worsening right lower back pain     (10 Sep 2023 10:59)      SUBJECTIVE / OVERNIGHT EVENTS: No new complaints.   Review of Systems  chest pain no  palpitations no  sob no  nausea no  headache no    MEDICATIONS  (STANDING):  allopurinol 300 milliGRAM(s) Oral daily  ascorbic acid 500 milliGRAM(s) Oral daily  aspirin 325 milliGRAM(s) Oral daily  atorvastatin 40 milliGRAM(s) Oral at bedtime  dextrose 5%. 1000 milliLiter(s) (100 mL/Hr) IV Continuous <Continuous>  dextrose 5%. 1000 milliLiter(s) (50 mL/Hr) IV Continuous <Continuous>  dextrose 50% Injectable 25 Gram(s) IV Push once  dextrose 50% Injectable 12.5 Gram(s) IV Push once  dextrose 50% Injectable 25 Gram(s) IV Push once  enoxaparin Injectable 30 milliGRAM(s) SubCutaneous every 24 hours  ergocalciferol 05726 Unit(s) Oral every week  glucagon  Injectable 1 milliGRAM(s) IntraMuscular once  insulin lispro (ADMELOG) corrective regimen sliding scale   SubCutaneous three times a day before meals  insulin lispro (ADMELOG) corrective regimen sliding scale   SubCutaneous at bedtime  levothyroxine 75 MICROGram(s) Oral <User Schedule>  levothyroxine 50 MICROGram(s) Oral <User Schedule>  metoprolol tartrate 25 milliGRAM(s) Oral at bedtime  multivitamin 1 Tablet(s) Oral daily    MEDICATIONS  (PRN):  acetaminophen     Tablet .. 650 milliGRAM(s) Oral every 6 hours PRN Temp greater or equal to 38.5C (101.3F), Mild Pain (1 - 3)  dextrose Oral Gel 15 Gram(s) Oral once PRN Blood Glucose LESS THAN 70 milliGRAM(s)/deciliter      Vital Signs Last 24 Hrs  T(C): 37 (20 Sep 2023 11:46), Max: 37.1 (19 Sep 2023 20:36)  T(F): 98.6 (20 Sep 2023 11:46), Max: 98.7 (19 Sep 2023 20:36)  HR: 74 (20 Sep 2023 11:46) (61 - 74)  BP: 147/56 (20 Sep 2023 11:46) (127/69 - 147/56)  BP(mean): --  RR: 18 (20 Sep 2023 11:46) (18 - 18)  SpO2: 98% (20 Sep 2023 11:46) (96% - 98%)    Parameters below as of 20 Sep 2023 11:46  Patient On (Oxygen Delivery Method): room air        PHYSICAL EXAM:  GENERAL: NAD, well-developed  HEAD:  Atraumatic, Normocephalic  EYES: EOMI, PERRLA, conjunctiva and sclera clear  NECK: Supple, No JVD  CHEST/LUNG: Clear to auscultation bilaterally; No wheeze  HEART: Regular rate and rhythm; No murmurs, rubs, or gallops  ABDOMEN: Soft, Nontender, Nondistended; Bowel sounds present  EXTREMITIES:  2+ Peripheral Pulses, No clubbing, cyanosis, or edema  PSYCH: AAOx3  NEUROLOGY: non-focal  SKIN: No rashes or lesions    LABS:                        10.9   10.00 )-----------( 322      ( 19 Sep 2023 06:58 )             33.6     09-20    128<L>  |  94<L>  |  26<H>  ----------------------------<  123<H>  4.5   |  20<L>  |  1.24    Ca    9.4      20 Sep 2023 07:06            Urinalysis Basic - ( 20 Sep 2023 07:06 )    Color: x / Appearance: x / SG: x / pH: x  Gluc: 123 mg/dL / Ketone: x  / Bili: x / Urobili: x   Blood: x / Protein: x / Nitrite: x   Leuk Esterase: x / RBC: x / WBC x   Sq Epi: x / Non Sq Epi: x / Bacteria: x          RADIOLOGY & ADDITIONAL TESTS:    Imaging Personally Reviewed:    Consultant(s) Notes Reviewed:      Care Discussed with Consultants/Other Providers:

## 2023-09-20 NOTE — PROGRESS NOTE ADULT - SUBJECTIVE AND OBJECTIVE BOX
Cardiovascular Disease Progress Note    Overnight events: No acute events overnight.  no new cardiac sx  Otherwise review of systems negative    Objective Findings:  T(C): 36.3 (09-20-23 @ 04:09), Max: 37.1 (09-19-23 @ 12:16)  HR: 61 (09-20-23 @ 04:09) (61 - 67)  BP: 130/80 (09-20-23 @ 04:09) (127/69 - 130/80)  RR: 18 (09-20-23 @ 04:09) (18 - 18)  SpO2: 96% (09-20-23 @ 04:09) (96% - 99%)  Wt(kg): --  Daily     Daily       Physical Exam:  Gen: NAD  HEENT: EOMI  CV: RRR, normal S1 + S2, no m/r/g  Lungs: CTAB  Abd: soft, non-tender  Ext: No edema    Telemetry:    Laboratory Data:                        10.9   10.00 )-----------( 322      ( 19 Sep 2023 06:58 )             33.6     09-19    127<L>  |  95<L>  |  24<H>  ----------------------------<  124<H>  4.1   |  18<L>  |  1.16    Ca    9.4      19 Sep 2023 06:59                Inpatient Medications:  MEDICATIONS  (STANDING):  allopurinol 300 milliGRAM(s) Oral daily  amLODIPine   Tablet 5 milliGRAM(s) Oral daily  ascorbic acid 500 milliGRAM(s) Oral daily  aspirin 325 milliGRAM(s) Oral daily  atorvastatin 40 milliGRAM(s) Oral at bedtime  dextrose 5%. 1000 milliLiter(s) (100 mL/Hr) IV Continuous <Continuous>  dextrose 5%. 1000 milliLiter(s) (50 mL/Hr) IV Continuous <Continuous>  dextrose 50% Injectable 25 Gram(s) IV Push once  dextrose 50% Injectable 12.5 Gram(s) IV Push once  dextrose 50% Injectable 25 Gram(s) IV Push once  enoxaparin Injectable 30 milliGRAM(s) SubCutaneous every 24 hours  ergocalciferol 66906 Unit(s) Oral every week  glucagon  Injectable 1 milliGRAM(s) IntraMuscular once  insulin lispro (ADMELOG) corrective regimen sliding scale   SubCutaneous three times a day before meals  insulin lispro (ADMELOG) corrective regimen sliding scale   SubCutaneous at bedtime  levothyroxine 75 MICROGram(s) Oral <User Schedule>  levothyroxine 50 MICROGram(s) Oral <User Schedule>  metoprolol tartrate 25 milliGRAM(s) Oral at bedtime  multivitamin 1 Tablet(s) Oral daily      Assessment:  73-year-old female with history of HTN, CAD, carotid artery disease, DM, hypothyroid, and recent atraumatic right pelvic fracture (unclear exact fracture per patient but seems to indicate pubic rami) who presents with worsening right lower back pain which began approximately 4 to 5 days ago.  Patient states pain was mild initially at onset has been constant worsening since onset and denies any trauma or specific eliciting event.  No fever.  No numbness or weakness in extremities.  No loss of control of bowels or bladder.  No urinary symptoms no diarrhea.  No abdominal pain.  No nausea or vomiting. Further history from patient had right pelvic fracture which is likely his pubic rami though have no imaging in EMR and unclear history per patient.  Per her history no trauma or falls.    Recs:  cardiac stable  no e/o acs or chf  cw asa and cont with statin give hx of cad/pad  cw antihypertensives   renal f/u  pain control  ortho/nsgy eval - conservative management  s/p mri spine, results noted  dcp  to [facility] once cleared by renal          Over 25 minutes spent on total encounter; more than 50% of the visit was spent counseling and/or coordinating care by the attending physician.      Carlos Diego MD   Cardiovascular Disease  [phone] Cardiovascular Disease Progress Note    Overnight events: No acute events overnight.  no new cardiac sx  Otherwise review of systems negative    Objective Findings:  T(C): 36.3 (09-20-23 @ 04:09), Max: 37.1 (09-19-23 @ 12:16)  HR: 61 (09-20-23 @ 04:09) (61 - 67)  BP: 130/80 (09-20-23 @ 04:09) (127/69 - 130/80)  RR: 18 (09-20-23 @ 04:09) (18 - 18)  SpO2: 96% (09-20-23 @ 04:09) (96% - 99%)  Wt(kg): --  Daily     Daily       Physical Exam:  Gen: NAD  HEENT: EOMI  CV: RRR, normal S1 + S2, no m/r/g  Lungs: CTAB  Abd: soft, non-tender  Ext: No edema    Telemetry:    Laboratory Data:                        10.9   10.00 )-----------( 322      ( 19 Sep 2023 06:58 )             33.6     09-19    127<L>  |  95<L>  |  24<H>  ----------------------------<  124<H>  4.1   |  18<L>  |  1.16    Ca    9.4      19 Sep 2023 06:59                Inpatient Medications:  MEDICATIONS  (STANDING):  allopurinol 300 milliGRAM(s) Oral daily  amLODIPine   Tablet 5 milliGRAM(s) Oral daily  ascorbic acid 500 milliGRAM(s) Oral daily  aspirin 325 milliGRAM(s) Oral daily  atorvastatin 40 milliGRAM(s) Oral at bedtime  dextrose 5%. 1000 milliLiter(s) (100 mL/Hr) IV Continuous <Continuous>  dextrose 5%. 1000 milliLiter(s) (50 mL/Hr) IV Continuous <Continuous>  dextrose 50% Injectable 25 Gram(s) IV Push once  dextrose 50% Injectable 12.5 Gram(s) IV Push once  dextrose 50% Injectable 25 Gram(s) IV Push once  enoxaparin Injectable 30 milliGRAM(s) SubCutaneous every 24 hours  ergocalciferol 17551 Unit(s) Oral every week  glucagon  Injectable 1 milliGRAM(s) IntraMuscular once  insulin lispro (ADMELOG) corrective regimen sliding scale   SubCutaneous three times a day before meals  insulin lispro (ADMELOG) corrective regimen sliding scale   SubCutaneous at bedtime  levothyroxine 75 MICROGram(s) Oral <User Schedule>  levothyroxine 50 MICROGram(s) Oral <User Schedule>  metoprolol tartrate 25 milliGRAM(s) Oral at bedtime  multivitamin 1 Tablet(s) Oral daily      Assessment:  73-year-old female with history of HTN, CAD, carotid artery disease, DM, hypothyroid, and recent atraumatic right pelvic fracture (unclear exact fracture per patient but seems to indicate pubic rami) who presents with worsening right lower back pain which began approximately 4 to 5 days ago.  Patient states pain was mild initially at onset has been constant worsening since onset and denies any trauma or specific eliciting event.  No fever.  No numbness or weakness in extremities.  No loss of control of bowels or bladder.  No urinary symptoms no diarrhea.  No abdominal pain.  No nausea or vomiting. Further history from patient had right pelvic fracture which is likely his pubic rami though have no imaging in EMR and unclear history per patient.  Per her history no trauma or falls.    Recs:  cardiac stable  no e/o acs or chf  cw asa and cont with statin give hx of cad/pad  cw antihypertensives   renal f/u  pain control  ortho/nsgy eval - conservative management  s/p mri spine, results noted  dcp  to [facility] once cleared by renal          Over 25 minutes spent on total encounter; more than 50% of the visit was spent counseling and/or coordinating care by the attending physician.      Carlos Diego MD   Cardiovascular Disease  [phone] Cardiovascular Disease Progress Note    Overnight events: No acute events overnight.  no new cardiac sx  Otherwise review of systems negative    Objective Findings:  T(C): 36.3 (09-20-23 @ 04:09), Max: 37.1 (09-19-23 @ 12:16)  HR: 61 (09-20-23 @ 04:09) (61 - 67)  BP: 130/80 (09-20-23 @ 04:09) (127/69 - 130/80)  RR: 18 (09-20-23 @ 04:09) (18 - 18)  SpO2: 96% (09-20-23 @ 04:09) (96% - 99%)  Wt(kg): --  Daily     Daily       Physical Exam:  Gen: NAD  HEENT: EOMI  CV: RRR, normal S1 + S2, no m/r/g  Lungs: CTAB  Abd: soft, non-tender  Ext: No edema    Telemetry:    Laboratory Data:                        10.9   10.00 )-----------( 322      ( 19 Sep 2023 06:58 )             33.6     09-19    127<L>  |  95<L>  |  24<H>  ----------------------------<  124<H>  4.1   |  18<L>  |  1.16    Ca    9.4      19 Sep 2023 06:59                Inpatient Medications:  MEDICATIONS  (STANDING):  allopurinol 300 milliGRAM(s) Oral daily  amLODIPine   Tablet 5 milliGRAM(s) Oral daily  ascorbic acid 500 milliGRAM(s) Oral daily  aspirin 325 milliGRAM(s) Oral daily  atorvastatin 40 milliGRAM(s) Oral at bedtime  dextrose 5%. 1000 milliLiter(s) (100 mL/Hr) IV Continuous <Continuous>  dextrose 5%. 1000 milliLiter(s) (50 mL/Hr) IV Continuous <Continuous>  dextrose 50% Injectable 25 Gram(s) IV Push once  dextrose 50% Injectable 12.5 Gram(s) IV Push once  dextrose 50% Injectable 25 Gram(s) IV Push once  enoxaparin Injectable 30 milliGRAM(s) SubCutaneous every 24 hours  ergocalciferol 89386 Unit(s) Oral every week  glucagon  Injectable 1 milliGRAM(s) IntraMuscular once  insulin lispro (ADMELOG) corrective regimen sliding scale   SubCutaneous three times a day before meals  insulin lispro (ADMELOG) corrective regimen sliding scale   SubCutaneous at bedtime  levothyroxine 75 MICROGram(s) Oral <User Schedule>  levothyroxine 50 MICROGram(s) Oral <User Schedule>  metoprolol tartrate 25 milliGRAM(s) Oral at bedtime  multivitamin 1 Tablet(s) Oral daily      Assessment:  73-year-old female with history of HTN, CAD, carotid artery disease, DM, hypothyroid, and recent atraumatic right pelvic fracture (unclear exact fracture per patient but seems to indicate pubic rami) who presents with worsening right lower back pain which began approximately 4 to 5 days ago.  Patient states pain was mild initially at onset has been constant worsening since onset and denies any trauma or specific eliciting event.  No fever.  No numbness or weakness in extremities.  No loss of control of bowels or bladder.  No urinary symptoms no diarrhea.  No abdominal pain.  No nausea or vomiting. Further history from patient had right pelvic fracture which is likely his pubic rami though have no imaging in EMR and unclear history per patient.  Per her history no trauma or falls.    Recs:  cardiac stable  no e/o acs or chf  cw asa and cont with statin give hx of cad/pad  cw antihypertensives   renal f/u  pain control  ortho/nsgy eval - conservative management  s/p mri spine, results noted  dcp  to [facility] once cleared by renal          Over 25 minutes spent on total encounter; more than 50% of the visit was spent counseling and/or coordinating care by the attending physician.      Carlos Diego MD   Cardiovascular Disease  [phone]

## 2023-09-20 NOTE — PROGRESS NOTE ADULT - ASSESSMENT
73 f with    Back pain/ Sacral fracture  - pain control  - MRI LS spine noted  - CT pelvis noted  - PT  - Neurosurgery evaluation noted. No intervention     Hypercalcemia  - follow    Vit D deficiency  - supplement    CAD (coronary artery disease)   - stable  - Cardiology follow dr Diego     Diabetes mellitus   - ADA diet  - BS control    H/O pericardiotomy   - Echo    History of gout   - continue Rx  - Uric acid    Hyperlipidemia   - stable    Hypertension   - control    Hypothyroidism   - supplement  - follow TSH    JONATHAN improving   - follow Cr, lytes  - restart Lasix 20 mg.     Hyponatremia  - Nephrology follow Dr. Varela   - s/p Samsca     DVT prophylaxis     DCP in progress when Nephrology cleared.     d/w patient and HCP at patient's request     Guido Rolon MD phone [phone]      73 f with    Back pain/ Sacral fracture  - pain control  - MRI LS spine noted  - CT pelvis noted  - PT  - Neurosurgery evaluation noted. No intervention     Hypercalcemia  - follow    Vit D deficiency  - supplement    CAD (coronary artery disease)   - stable  - Cardiology follow dr Diego     Diabetes mellitus   - ADA diet  - BS control    H/O pericardiotomy   - Echo    History of gout   - continue Rx  - Uric acid    Hyperlipidemia   - stable    Hypertension   - control    Hypothyroidism   - supplement  - follow TSH    JONATHAN improving   - follow Cr, lytes  - restart Lasix 20 mg.     Hyponatremia  - Nephrology follow Dr. Varela   - s/p Samsca     DVT prophylaxis     DCP in progress when Nephrology cleared.     d/w patient and HCP at patient's request     Guido Rolon MD phone [phone]      73 f with    Back pain/ Sacral fracture  - pain control  - MRI LS spine noted  - CT pelvis noted  - PT  - Neurosurgery evaluation noted. No intervention     Hypercalcemia  - follow    Vit D deficiency  - supplement    CAD (coronary artery disease)   - stable  - Cardiology follow dr Diego     Diabetes mellitus   - ADA diet  - BS control    H/O pericardiotomy   - Echo    History of gout   - continue Rx  - Uric acid    Hyperlipidemia   - stable    Hypertension   - control    Hypothyroidism   - supplement  - follow TSH    JONATHAN improving   - follow Cr, lytes  - restart Lasix 20 mg.     Hyponatremia  - Nephrology follow Dr. Varela   - s/p Samsca     DVT prophylaxis     DCP in progress when Nephrology cleared.     d/w patient and HCP at patient's request     Guido Rolon MD phone [phone]

## 2023-09-21 ENCOUNTER — TRANSCRIPTION ENCOUNTER (OUTPATIENT)
Age: 73
End: 2023-09-21

## 2023-09-21 VITALS
HEART RATE: 70 BPM | DIASTOLIC BLOOD PRESSURE: 69 MMHG | OXYGEN SATURATION: 97 % | SYSTOLIC BLOOD PRESSURE: 111 MMHG | TEMPERATURE: 98 F | RESPIRATION RATE: 17 BRPM

## 2023-09-21 LAB
ANION GAP SERPL CALC-SCNC: 13 MMOL/L — SIGNIFICANT CHANGE UP (ref 5–17)
BUN SERPL-MCNC: 23 MG/DL — SIGNIFICANT CHANGE UP (ref 7–23)
CALCIUM SERPL-MCNC: 9.6 MG/DL — SIGNIFICANT CHANGE UP (ref 8.4–10.5)
CHLORIDE SERPL-SCNC: 101 MMOL/L — SIGNIFICANT CHANGE UP (ref 96–108)
CO2 SERPL-SCNC: 20 MMOL/L — LOW (ref 22–31)
CREAT SERPL-MCNC: 1.05 MG/DL — SIGNIFICANT CHANGE UP (ref 0.5–1.3)
EGFR: 55 ML/MIN/1.73M2 — LOW
GLUCOSE BLDC GLUCOMTR-MCNC: 123 MG/DL — HIGH (ref 70–99)
GLUCOSE BLDC GLUCOMTR-MCNC: 194 MG/DL — HIGH (ref 70–99)
GLUCOSE SERPL-MCNC: 125 MG/DL — HIGH (ref 70–99)
POTASSIUM SERPL-MCNC: 4.3 MMOL/L — SIGNIFICANT CHANGE UP (ref 3.5–5.3)
POTASSIUM SERPL-SCNC: 4.3 MMOL/L — SIGNIFICANT CHANGE UP (ref 3.5–5.3)
SODIUM SERPL-SCNC: 134 MMOL/L — LOW (ref 135–145)

## 2023-09-21 RX ORDER — METOPROLOL TARTRATE 50 MG
1 TABLET ORAL
Qty: 0 | Refills: 0 | DISCHARGE
Start: 2023-09-21

## 2023-09-21 RX ORDER — ACETAMINOPHEN 500 MG
2 TABLET ORAL
Qty: 0 | Refills: 0 | DISCHARGE
Start: 2023-09-21

## 2023-09-21 RX ORDER — ALLOPURINOL 300 MG
1 TABLET ORAL
Qty: 0 | Refills: 0 | DISCHARGE
Start: 2023-09-21

## 2023-09-21 RX ORDER — ASPIRIN/CALCIUM CARB/MAGNESIUM 324 MG
1 TABLET ORAL
Qty: 0 | Refills: 0 | DISCHARGE
Start: 2023-09-21

## 2023-09-21 RX ORDER — LEVOTHYROXINE SODIUM 125 MCG
1 TABLET ORAL
Qty: 0 | Refills: 0 | DISCHARGE
Start: 2023-09-21

## 2023-09-21 RX ORDER — ERGOCALCIFEROL 1.25 MG/1
1 CAPSULE ORAL
Qty: 0 | Refills: 0 | DISCHARGE
Start: 2023-09-21

## 2023-09-21 RX ORDER — ASCORBIC ACID 60 MG
1 TABLET,CHEWABLE ORAL
Qty: 0 | Refills: 0 | DISCHARGE
Start: 2023-09-21

## 2023-09-21 RX ADMIN — ENOXAPARIN SODIUM 30 MILLIGRAM(S): 100 INJECTION SUBCUTANEOUS at 05:34

## 2023-09-21 RX ADMIN — Medication 1 TABLET(S): at 11:26

## 2023-09-21 RX ADMIN — Medication 300 MILLIGRAM(S): at 11:25

## 2023-09-21 RX ADMIN — Medication 325 MILLIGRAM(S): at 11:25

## 2023-09-21 RX ADMIN — Medication 2: at 13:21

## 2023-09-21 RX ADMIN — Medication 75 MICROGRAM(S): at 09:11

## 2023-09-21 RX ADMIN — Medication 500 MILLIGRAM(S): at 11:25

## 2023-09-21 NOTE — DISCHARGE NOTE PROVIDER - NSDCCPCAREPLAN_GEN_ALL_CORE_FT
PRINCIPAL DISCHARGE DIAGNOSIS  Diagnosis: Back pain  Assessment and Plan of Treatment: due to sacral alar fracture  evaluated by neurosurgery inpatient; if pain continues   followup with Dr. Boggs as needed        SECONDARY DISCHARGE DIAGNOSES  Diagnosis: Hyponatremia  Assessment and Plan of Treatment: with JONATHAN; now resolved  followup Dr. Eliecer Starkey  HOME CARE INSTRUCTIONS  Only take medicines as directed by your caregiver. Many medicines can make hyponatremia worse. Discuss all your medicines with your caregiver.  Carefully follow any recommended diet, including any fluid restrictions.  You may be asked to repeat lab tests. Follow these directions.  Avoid alcohol and recreational drugs.  SEEK MEDICAL CARE IF:  You develop worsening nausea, fatigue, headache, confusion, or weakness.  Your original hyponatremia symptoms return.  You have problems following the recommended diet.   SEEK IMMEDIATE MEDICAL CARE IF:  You have a seizure.  You faint.  You have ongoing diarrhea or vomiting

## 2023-09-21 NOTE — DISCHARGE NOTE PROVIDER - DETAILS OF MALNUTRITION DIAGNOSIS/DIAGNOSES
This patient has been assessed with a concern for Malnutrition and was treated during this hospitalization for the following Nutrition diagnosis/diagnoses:     -  09/10/2023: Severe protein-calorie malnutrition

## 2023-09-21 NOTE — DISCHARGE NOTE NURSING/CASE MANAGEMENT/SOCIAL WORK - PATIENT PORTAL LINK FT
You can access the FollowMyHealth Patient Portal offered by [facility] by registering at the following website: [email or web address]. By joining [employer]’s FollowMyHealth portal, you will also be able to view your health information using other applications (apps) compatible with our system. You can access the FollowMyHealth Patient Portal offered by [facility] by registering at the following website: [email or web address]. By joining [employer]’s FollowMyHealth portal, you will also be able to view your health information using other applications (apps) compatible with our system. You can access the FollowMyHealth Patient Portal offered by [facility] by registering at the following website: [email or web address]. By joining [employer]’s FollowMyHealth portal, you will also be able to view your health information using other applications (apps) compatible with our system.

## 2023-09-21 NOTE — PROGRESS NOTE ADULT - SUBJECTIVE AND OBJECTIVE BOX
Patient is a 73y old  Female who presents with a chief complaint of Pt is a 73-year-old female with history of HTN, CAD and unclear open heart surgery (per patient may be CABG, on  daily), DM, hypothyroid, and recent atraumatic right pelvic fracture (unclear exact fracture per patient but seems to indicate pubic rami) who presents with worsening right lower back pain     (10 Sep 2023 10:59)      SUBJECTIVE / OVERNIGHT EVENTS: Comfortable without new complaints.   Review of Systems  chest pain no  palpitations no  sob no  nausea no  headache no    MEDICATIONS  (STANDING):  allopurinol 300 milliGRAM(s) Oral daily  ascorbic acid 500 milliGRAM(s) Oral daily  aspirin 325 milliGRAM(s) Oral daily  atorvastatin 40 milliGRAM(s) Oral at bedtime  dextrose 5%. 1000 milliLiter(s) (50 mL/Hr) IV Continuous <Continuous>  dextrose 5%. 1000 milliLiter(s) (100 mL/Hr) IV Continuous <Continuous>  dextrose 50% Injectable 25 Gram(s) IV Push once  dextrose 50% Injectable 12.5 Gram(s) IV Push once  dextrose 50% Injectable 25 Gram(s) IV Push once  enoxaparin Injectable 30 milliGRAM(s) SubCutaneous every 24 hours  ergocalciferol 05205 Unit(s) Oral every week  glucagon  Injectable 1 milliGRAM(s) IntraMuscular once  insulin lispro (ADMELOG) corrective regimen sliding scale   SubCutaneous three times a day before meals  insulin lispro (ADMELOG) corrective regimen sliding scale   SubCutaneous at bedtime  levothyroxine 75 MICROGram(s) Oral <User Schedule>  levothyroxine 50 MICROGram(s) Oral <User Schedule>  metoprolol tartrate 25 milliGRAM(s) Oral at bedtime  multivitamin 1 Tablet(s) Oral daily    MEDICATIONS  (PRN):  acetaminophen     Tablet .. 650 milliGRAM(s) Oral every 6 hours PRN Temp greater or equal to 38.5C (101.3F), Mild Pain (1 - 3)  dextrose Oral Gel 15 Gram(s) Oral once PRN Blood Glucose LESS THAN 70 milliGRAM(s)/deciliter      Vital Signs Last 24 Hrs  T(C): 36.6 (21 Sep 2023 15:29), Max: 36.9 (20 Sep 2023 19:52)  T(F): 97.8 (21 Sep 2023 15:29), Max: 98.4 (20 Sep 2023 19:52)  HR: 69 (21 Sep 2023 15:29) (60 - 73)  BP: 150/59 (21 Sep 2023 15:29) (114/69 - 150/59)  BP(mean): --  RR: 18 (21 Sep 2023 15:29) (18 - 18)  SpO2: 98% (21 Sep 2023 15:29) (95% - 99%)    Parameters below as of 21 Sep 2023 15:29  Patient On (Oxygen Delivery Method): room air        PHYSICAL EXAM:  GENERAL: NAD, well-developed  HEAD:  Atraumatic, Normocephalic  EYES: EOMI, PERRLA, conjunctiva and sclera clear  NECK: Supple, No JVD  CHEST/LUNG: Clear to auscultation bilaterally; No wheeze  HEART: Regular rate and rhythm; No murmurs, rubs, or gallops  ABDOMEN: Soft, Nontender, Nondistended; Bowel sounds present  EXTREMITIES:  2+ Peripheral Pulses, No clubbing, cyanosis, or edema  PSYCH: AAOx3  NEUROLOGY: non-focal  SKIN: No rashes or lesions    LABS:    09-21    134<L>  |  101  |  23  ----------------------------<  125<H>  4.3   |  20<L>  |  1.05    Ca    9.6      21 Sep 2023 07:27            Urinalysis Basic - ( 21 Sep 2023 07:27 )    Color: x / Appearance: x / SG: x / pH: x  Gluc: 125 mg/dL / Ketone: x  / Bili: x / Urobili: x   Blood: x / Protein: x / Nitrite: x   Leuk Esterase: x / RBC: x / WBC x   Sq Epi: x / Non Sq Epi: x / Bacteria: x          RADIOLOGY & ADDITIONAL TESTS:    Imaging Personally Reviewed:    Consultant(s) Notes Reviewed:      Care Discussed with Consultants/Other Providers:   Patient is a 73y old  Female who presents with a chief complaint of Pt is a 73-year-old female with history of HTN, CAD and unclear open heart surgery (per patient may be CABG, on  daily), DM, hypothyroid, and recent atraumatic right pelvic fracture (unclear exact fracture per patient but seems to indicate pubic rami) who presents with worsening right lower back pain     (10 Sep 2023 10:59)      SUBJECTIVE / OVERNIGHT EVENTS: Comfortable without new complaints.   Review of Systems  chest pain no  palpitations no  sob no  nausea no  headache no    MEDICATIONS  (STANDING):  allopurinol 300 milliGRAM(s) Oral daily  ascorbic acid 500 milliGRAM(s) Oral daily  aspirin 325 milliGRAM(s) Oral daily  atorvastatin 40 milliGRAM(s) Oral at bedtime  dextrose 5%. 1000 milliLiter(s) (50 mL/Hr) IV Continuous <Continuous>  dextrose 5%. 1000 milliLiter(s) (100 mL/Hr) IV Continuous <Continuous>  dextrose 50% Injectable 25 Gram(s) IV Push once  dextrose 50% Injectable 12.5 Gram(s) IV Push once  dextrose 50% Injectable 25 Gram(s) IV Push once  enoxaparin Injectable 30 milliGRAM(s) SubCutaneous every 24 hours  ergocalciferol 74134 Unit(s) Oral every week  glucagon  Injectable 1 milliGRAM(s) IntraMuscular once  insulin lispro (ADMELOG) corrective regimen sliding scale   SubCutaneous three times a day before meals  insulin lispro (ADMELOG) corrective regimen sliding scale   SubCutaneous at bedtime  levothyroxine 75 MICROGram(s) Oral <User Schedule>  levothyroxine 50 MICROGram(s) Oral <User Schedule>  metoprolol tartrate 25 milliGRAM(s) Oral at bedtime  multivitamin 1 Tablet(s) Oral daily    MEDICATIONS  (PRN):  acetaminophen     Tablet .. 650 milliGRAM(s) Oral every 6 hours PRN Temp greater or equal to 38.5C (101.3F), Mild Pain (1 - 3)  dextrose Oral Gel 15 Gram(s) Oral once PRN Blood Glucose LESS THAN 70 milliGRAM(s)/deciliter      Vital Signs Last 24 Hrs  T(C): 36.6 (21 Sep 2023 15:29), Max: 36.9 (20 Sep 2023 19:52)  T(F): 97.8 (21 Sep 2023 15:29), Max: 98.4 (20 Sep 2023 19:52)  HR: 69 (21 Sep 2023 15:29) (60 - 73)  BP: 150/59 (21 Sep 2023 15:29) (114/69 - 150/59)  BP(mean): --  RR: 18 (21 Sep 2023 15:29) (18 - 18)  SpO2: 98% (21 Sep 2023 15:29) (95% - 99%)    Parameters below as of 21 Sep 2023 15:29  Patient On (Oxygen Delivery Method): room air        PHYSICAL EXAM:  GENERAL: NAD, well-developed  HEAD:  Atraumatic, Normocephalic  EYES: EOMI, PERRLA, conjunctiva and sclera clear  NECK: Supple, No JVD  CHEST/LUNG: Clear to auscultation bilaterally; No wheeze  HEART: Regular rate and rhythm; No murmurs, rubs, or gallops  ABDOMEN: Soft, Nontender, Nondistended; Bowel sounds present  EXTREMITIES:  2+ Peripheral Pulses, No clubbing, cyanosis, or edema  PSYCH: AAOx3  NEUROLOGY: non-focal  SKIN: No rashes or lesions    LABS:    09-21    134<L>  |  101  |  23  ----------------------------<  125<H>  4.3   |  20<L>  |  1.05    Ca    9.6      21 Sep 2023 07:27            Urinalysis Basic - ( 21 Sep 2023 07:27 )    Color: x / Appearance: x / SG: x / pH: x  Gluc: 125 mg/dL / Ketone: x  / Bili: x / Urobili: x   Blood: x / Protein: x / Nitrite: x   Leuk Esterase: x / RBC: x / WBC x   Sq Epi: x / Non Sq Epi: x / Bacteria: x          RADIOLOGY & ADDITIONAL TESTS:    Imaging Personally Reviewed:    Consultant(s) Notes Reviewed:      Care Discussed with Consultants/Other Providers:   Patient is a 73y old  Female who presents with a chief complaint of Pt is a 73-year-old female with history of HTN, CAD and unclear open heart surgery (per patient may be CABG, on  daily), DM, hypothyroid, and recent atraumatic right pelvic fracture (unclear exact fracture per patient but seems to indicate pubic rami) who presents with worsening right lower back pain     (10 Sep 2023 10:59)      SUBJECTIVE / OVERNIGHT EVENTS: Comfortable without new complaints.   Review of Systems  chest pain no  palpitations no  sob no  nausea no  headache no    MEDICATIONS  (STANDING):  allopurinol 300 milliGRAM(s) Oral daily  ascorbic acid 500 milliGRAM(s) Oral daily  aspirin 325 milliGRAM(s) Oral daily  atorvastatin 40 milliGRAM(s) Oral at bedtime  dextrose 5%. 1000 milliLiter(s) (50 mL/Hr) IV Continuous <Continuous>  dextrose 5%. 1000 milliLiter(s) (100 mL/Hr) IV Continuous <Continuous>  dextrose 50% Injectable 25 Gram(s) IV Push once  dextrose 50% Injectable 12.5 Gram(s) IV Push once  dextrose 50% Injectable 25 Gram(s) IV Push once  enoxaparin Injectable 30 milliGRAM(s) SubCutaneous every 24 hours  ergocalciferol 37266 Unit(s) Oral every week  glucagon  Injectable 1 milliGRAM(s) IntraMuscular once  insulin lispro (ADMELOG) corrective regimen sliding scale   SubCutaneous three times a day before meals  insulin lispro (ADMELOG) corrective regimen sliding scale   SubCutaneous at bedtime  levothyroxine 75 MICROGram(s) Oral <User Schedule>  levothyroxine 50 MICROGram(s) Oral <User Schedule>  metoprolol tartrate 25 milliGRAM(s) Oral at bedtime  multivitamin 1 Tablet(s) Oral daily    MEDICATIONS  (PRN):  acetaminophen     Tablet .. 650 milliGRAM(s) Oral every 6 hours PRN Temp greater or equal to 38.5C (101.3F), Mild Pain (1 - 3)  dextrose Oral Gel 15 Gram(s) Oral once PRN Blood Glucose LESS THAN 70 milliGRAM(s)/deciliter      Vital Signs Last 24 Hrs  T(C): 36.6 (21 Sep 2023 15:29), Max: 36.9 (20 Sep 2023 19:52)  T(F): 97.8 (21 Sep 2023 15:29), Max: 98.4 (20 Sep 2023 19:52)  HR: 69 (21 Sep 2023 15:29) (60 - 73)  BP: 150/59 (21 Sep 2023 15:29) (114/69 - 150/59)  BP(mean): --  RR: 18 (21 Sep 2023 15:29) (18 - 18)  SpO2: 98% (21 Sep 2023 15:29) (95% - 99%)    Parameters below as of 21 Sep 2023 15:29  Patient On (Oxygen Delivery Method): room air        PHYSICAL EXAM:  GENERAL: NAD, well-developed  HEAD:  Atraumatic, Normocephalic  EYES: EOMI, PERRLA, conjunctiva and sclera clear  NECK: Supple, No JVD  CHEST/LUNG: Clear to auscultation bilaterally; No wheeze  HEART: Regular rate and rhythm; No murmurs, rubs, or gallops  ABDOMEN: Soft, Nontender, Nondistended; Bowel sounds present  EXTREMITIES:  2+ Peripheral Pulses, No clubbing, cyanosis, or edema  PSYCH: AAOx3  NEUROLOGY: non-focal  SKIN: No rashes or lesions    LABS:    09-21    134<L>  |  101  |  23  ----------------------------<  125<H>  4.3   |  20<L>  |  1.05    Ca    9.6      21 Sep 2023 07:27            Urinalysis Basic - ( 21 Sep 2023 07:27 )    Color: x / Appearance: x / SG: x / pH: x  Gluc: 125 mg/dL / Ketone: x  / Bili: x / Urobili: x   Blood: x / Protein: x / Nitrite: x   Leuk Esterase: x / RBC: x / WBC x   Sq Epi: x / Non Sq Epi: x / Bacteria: x          RADIOLOGY & ADDITIONAL TESTS:    Imaging Personally Reviewed:    Consultant(s) Notes Reviewed:      Care Discussed with Consultants/Other Providers:

## 2023-09-21 NOTE — PROGRESS NOTE ADULT - NUTRITIONAL ASSESSMENT
This patient has been assessed with a concern for Malnutrition and has been determined to have a diagnosis/diagnoses of Severe protein-calorie malnutrition.    This patient is being managed with:   Diet Regular-  Consistent Carbohydrate {Evening Snack} (CSTCHOSN)  1000mL Fluid Restriction ([id number])  Entered: Sep 12 2023 12:26PM   This patient has been assessed with a concern for Malnutrition and has been determined to have a diagnosis/diagnoses of Severe protein-calorie malnutrition.    This patient is being managed with:   Diet Regular-  Consistent Carbohydrate {Evening Snack} (CSTCHOSN)  1000mL Fluid Restriction ([id number])  Entered: Sep 12 2023 12:26PM   This patient has been assessed with a concern for Malnutrition and has been determined to have a diagnosis/diagnoses of Severe protein-calorie malnutrition.    This patient is being managed with:   Diet Regular-  Consistent Carbohydrate {Evening Snack} (CSTCHOSN)  1000mL Fluid Restriction ([id number])  Entered: Sep 12 2023 12:26PM

## 2023-09-21 NOTE — DISCHARGE NOTE PROVIDER - CARE PROVIDER_API CALL
frankie hussein  pmd  Phone: (   )    -  Fax: (   )    -  Follow Up Time:     Eliecer Starkey  Nephrology  [address] [facility] [address]  [place] [address]  Phone: [phone]  Fax: [phone]  Follow Up Time:    frankie hussein  pmd  Phone: (   )    -  Fax: (   )    -  Follow Up Time:     Eliecer Starkey  Nephrology  [address] [facility] [address]  [place] [address]  Phone: [phone]  Fax: [phone]  Follow Up Time:    frankie hussein  pmd  Phone: (   )    -  Fax: (   )    -  Follow Up Time:     Eliecer Starkey  Nephrology  [address] [facility] [address]  [place] [address]  Phone: [phone]  Fax: [phone]  Follow Up Time:

## 2023-09-21 NOTE — PROGRESS NOTE ADULT - SUBJECTIVE AND OBJECTIVE BOX
CARDIOLOGY FOLLOW UP - Dr. Medley  DATE OF SERVICE: 9/21/23    CC  No CV complaints    REVIEW OF SYSTEMS:  CONSTITUTIONAL: No fever, weight loss, or fatigue  RESPIRATORY: No cough, wheezing, chills or hemoptysis; No Shortness of Breath  CARDIOVASCULAR: No chest pain, palpitations, passing out, dizziness, or leg swelling  GASTROINTESTINAL: No abdominal or epigastric pain. No nausea, vomiting, or hematemesis; No diarrhea or constipation. No melena or hematochezia.  VASCULAR: No edema     PHYSICAL EXAM:  T(C): 36.6 (09-21-23 @ 12:42), Max: 36.9 (09-20-23 @ 19:52)  HR: 60 (09-21-23 @ 04:56) (60 - 73)  BP: 114/69 (09-21-23 @ 04:56) (114/69 - 125/71)  RR: 18 (09-21-23 @ 04:56) (18 - 18)  SpO2: 95% (09-21-23 @ 04:56) (95% - 99%)  Wt(kg): --  I&O's Summary    20 Sep 2023 07:01  -  21 Sep 2023 07:00  --------------------------------------------------------  IN: 630 mL / OUT: 0 mL / NET: 630 mL    21 Sep 2023 07:01  -  21 Sep 2023 13:34  --------------------------------------------------------  IN: 240 mL / OUT: 0 mL / NET: 240 mL        Appearance: Normal	  Cardiovascular: Normal S1 S2,RRR, No JVD, No murmurs  Respiratory: Lungs clear to auscultation b/l  Gastrointestinal:  Soft, Non-tender, + BS	  Extremities: Normal range of motion, No clubbing, cyanosis or edema      Home Medications:  acetaminophen 325 mg oral tablet: 2 tab(s) orally every 6 hours As needed Temp greater or equal to 38.5C (101.3F), Mild Pain (1 - 3) (21 Sep 2023 12:42)  allopurinol 300 mg oral tablet: 1 tab(s) orally once a day (21 Sep 2023 12:42)  ascorbic acid 500 mg oral tablet: 1 tab(s) orally once a day (21 Sep 2023 12:42)  aspirin 325 mg oral tablet: 1 tab(s) orally once a day (21 Sep 2023 12:42)  levothyroxine 50 mcg (0.05 mg) oral tablet: 1 tab(s) orally every 7 days give every  Sunday (21 Sep 2023 12:56)  levothyroxine 75 mcg (0.075 mg) oral tablet: 1 tab(s) orally once a day (21 Sep 2023 12:56)  metFORMIN 500 mg oral tablet: 1 tab(s) orally once a day (07 Sep 2023 21:31)  metoprolol tartrate 25 mg oral tablet: 1 tab(s) orally once a day (at bedtime) (21 Sep 2023 12:42)  Multiple Vitamins oral tablet: 1 tab(s) orally once a day (21 Sep 2023 12:42)  simvastatin 20 mg oral tablet: 1 tab(s) orally every other day (at bedtime) (07 Sep 2023 21:31)  Vitamin D2 1.25 mg (50,000 intl units) oral capsule: 1 tab(s) orally every 7 days (21 Sep 2023 12:56)      MEDICATIONS  (STANDING):  allopurinol 300 milliGRAM(s) Oral daily  ascorbic acid 500 milliGRAM(s) Oral daily  aspirin 325 milliGRAM(s) Oral daily  atorvastatin 40 milliGRAM(s) Oral at bedtime  dextrose 5%. 1000 milliLiter(s) (100 mL/Hr) IV Continuous <Continuous>  dextrose 5%. 1000 milliLiter(s) (50 mL/Hr) IV Continuous <Continuous>  dextrose 50% Injectable 25 Gram(s) IV Push once  dextrose 50% Injectable 12.5 Gram(s) IV Push once  dextrose 50% Injectable 25 Gram(s) IV Push once  enoxaparin Injectable 30 milliGRAM(s) SubCutaneous every 24 hours  ergocalciferol 43887 Unit(s) Oral every week  glucagon  Injectable 1 milliGRAM(s) IntraMuscular once  insulin lispro (ADMELOG) corrective regimen sliding scale   SubCutaneous three times a day before meals  insulin lispro (ADMELOG) corrective regimen sliding scale   SubCutaneous at bedtime  levothyroxine 75 MICROGram(s) Oral <User Schedule>  levothyroxine 50 MICROGram(s) Oral <User Schedule>  metoprolol tartrate 25 milliGRAM(s) Oral at bedtime  multivitamin 1 Tablet(s) Oral daily      TELEMETRY: 	    ECG:  	  RADIOLOGY:   DIAGNOSTIC TESTING:  [ ] Echocardiogram:  [ ]  Catheterization:  [ ] Stress Test:    OTHER: 	    LABS:	 	        09-21    134<L>  |  101  |  23  ----------------------------<  125<H>  4.3   |  20<L>  |  1.05    Ca    9.6      21 Sep 2023 07:27               CARDIOLOGY FOLLOW UP - Dr. Medley  DATE OF SERVICE: 9/21/23    CC  No CV complaints    REVIEW OF SYSTEMS:  CONSTITUTIONAL: No fever, weight loss, or fatigue  RESPIRATORY: No cough, wheezing, chills or hemoptysis; No Shortness of Breath  CARDIOVASCULAR: No chest pain, palpitations, passing out, dizziness, or leg swelling  GASTROINTESTINAL: No abdominal or epigastric pain. No nausea, vomiting, or hematemesis; No diarrhea or constipation. No melena or hematochezia.  VASCULAR: No edema     PHYSICAL EXAM:  T(C): 36.6 (09-21-23 @ 12:42), Max: 36.9 (09-20-23 @ 19:52)  HR: 60 (09-21-23 @ 04:56) (60 - 73)  BP: 114/69 (09-21-23 @ 04:56) (114/69 - 125/71)  RR: 18 (09-21-23 @ 04:56) (18 - 18)  SpO2: 95% (09-21-23 @ 04:56) (95% - 99%)  Wt(kg): --  I&O's Summary    20 Sep 2023 07:01  -  21 Sep 2023 07:00  --------------------------------------------------------  IN: 630 mL / OUT: 0 mL / NET: 630 mL    21 Sep 2023 07:01  -  21 Sep 2023 13:34  --------------------------------------------------------  IN: 240 mL / OUT: 0 mL / NET: 240 mL        Appearance: Normal	  Cardiovascular: Normal S1 S2,RRR, No JVD, No murmurs  Respiratory: Lungs clear to auscultation b/l  Gastrointestinal:  Soft, Non-tender, + BS	  Extremities: Normal range of motion, No clubbing, cyanosis or edema      Home Medications:  acetaminophen 325 mg oral tablet: 2 tab(s) orally every 6 hours As needed Temp greater or equal to 38.5C (101.3F), Mild Pain (1 - 3) (21 Sep 2023 12:42)  allopurinol 300 mg oral tablet: 1 tab(s) orally once a day (21 Sep 2023 12:42)  ascorbic acid 500 mg oral tablet: 1 tab(s) orally once a day (21 Sep 2023 12:42)  aspirin 325 mg oral tablet: 1 tab(s) orally once a day (21 Sep 2023 12:42)  levothyroxine 50 mcg (0.05 mg) oral tablet: 1 tab(s) orally every 7 days give every  Sunday (21 Sep 2023 12:56)  levothyroxine 75 mcg (0.075 mg) oral tablet: 1 tab(s) orally once a day (21 Sep 2023 12:56)  metFORMIN 500 mg oral tablet: 1 tab(s) orally once a day (07 Sep 2023 21:31)  metoprolol tartrate 25 mg oral tablet: 1 tab(s) orally once a day (at bedtime) (21 Sep 2023 12:42)  Multiple Vitamins oral tablet: 1 tab(s) orally once a day (21 Sep 2023 12:42)  simvastatin 20 mg oral tablet: 1 tab(s) orally every other day (at bedtime) (07 Sep 2023 21:31)  Vitamin D2 1.25 mg (50,000 intl units) oral capsule: 1 tab(s) orally every 7 days (21 Sep 2023 12:56)      MEDICATIONS  (STANDING):  allopurinol 300 milliGRAM(s) Oral daily  ascorbic acid 500 milliGRAM(s) Oral daily  aspirin 325 milliGRAM(s) Oral daily  atorvastatin 40 milliGRAM(s) Oral at bedtime  dextrose 5%. 1000 milliLiter(s) (100 mL/Hr) IV Continuous <Continuous>  dextrose 5%. 1000 milliLiter(s) (50 mL/Hr) IV Continuous <Continuous>  dextrose 50% Injectable 25 Gram(s) IV Push once  dextrose 50% Injectable 12.5 Gram(s) IV Push once  dextrose 50% Injectable 25 Gram(s) IV Push once  enoxaparin Injectable 30 milliGRAM(s) SubCutaneous every 24 hours  ergocalciferol 87684 Unit(s) Oral every week  glucagon  Injectable 1 milliGRAM(s) IntraMuscular once  insulin lispro (ADMELOG) corrective regimen sliding scale   SubCutaneous three times a day before meals  insulin lispro (ADMELOG) corrective regimen sliding scale   SubCutaneous at bedtime  levothyroxine 75 MICROGram(s) Oral <User Schedule>  levothyroxine 50 MICROGram(s) Oral <User Schedule>  metoprolol tartrate 25 milliGRAM(s) Oral at bedtime  multivitamin 1 Tablet(s) Oral daily      TELEMETRY: 	    ECG:  	  RADIOLOGY:   DIAGNOSTIC TESTING:  [ ] Echocardiogram:  [ ]  Catheterization:  [ ] Stress Test:    OTHER: 	    LABS:	 	        09-21    134<L>  |  101  |  23  ----------------------------<  125<H>  4.3   |  20<L>  |  1.05    Ca    9.6      21 Sep 2023 07:27               CARDIOLOGY FOLLOW UP - Dr. Medley  DATE OF SERVICE: 9/21/23    CC  No CV complaints    REVIEW OF SYSTEMS:  CONSTITUTIONAL: No fever, weight loss, or fatigue  RESPIRATORY: No cough, wheezing, chills or hemoptysis; No Shortness of Breath  CARDIOVASCULAR: No chest pain, palpitations, passing out, dizziness, or leg swelling  GASTROINTESTINAL: No abdominal or epigastric pain. No nausea, vomiting, or hematemesis; No diarrhea or constipation. No melena or hematochezia.  VASCULAR: No edema     PHYSICAL EXAM:  T(C): 36.6 (09-21-23 @ 12:42), Max: 36.9 (09-20-23 @ 19:52)  HR: 60 (09-21-23 @ 04:56) (60 - 73)  BP: 114/69 (09-21-23 @ 04:56) (114/69 - 125/71)  RR: 18 (09-21-23 @ 04:56) (18 - 18)  SpO2: 95% (09-21-23 @ 04:56) (95% - 99%)  Wt(kg): --  I&O's Summary    20 Sep 2023 07:01  -  21 Sep 2023 07:00  --------------------------------------------------------  IN: 630 mL / OUT: 0 mL / NET: 630 mL    21 Sep 2023 07:01  -  21 Sep 2023 13:34  --------------------------------------------------------  IN: 240 mL / OUT: 0 mL / NET: 240 mL        Appearance: Normal	  Cardiovascular: Normal S1 S2,RRR, No JVD, No murmurs  Respiratory: Lungs clear to auscultation b/l  Gastrointestinal:  Soft, Non-tender, + BS	  Extremities: Normal range of motion, No clubbing, cyanosis or edema      Home Medications:  acetaminophen 325 mg oral tablet: 2 tab(s) orally every 6 hours As needed Temp greater or equal to 38.5C (101.3F), Mild Pain (1 - 3) (21 Sep 2023 12:42)  allopurinol 300 mg oral tablet: 1 tab(s) orally once a day (21 Sep 2023 12:42)  ascorbic acid 500 mg oral tablet: 1 tab(s) orally once a day (21 Sep 2023 12:42)  aspirin 325 mg oral tablet: 1 tab(s) orally once a day (21 Sep 2023 12:42)  levothyroxine 50 mcg (0.05 mg) oral tablet: 1 tab(s) orally every 7 days give every  Sunday (21 Sep 2023 12:56)  levothyroxine 75 mcg (0.075 mg) oral tablet: 1 tab(s) orally once a day (21 Sep 2023 12:56)  metFORMIN 500 mg oral tablet: 1 tab(s) orally once a day (07 Sep 2023 21:31)  metoprolol tartrate 25 mg oral tablet: 1 tab(s) orally once a day (at bedtime) (21 Sep 2023 12:42)  Multiple Vitamins oral tablet: 1 tab(s) orally once a day (21 Sep 2023 12:42)  simvastatin 20 mg oral tablet: 1 tab(s) orally every other day (at bedtime) (07 Sep 2023 21:31)  Vitamin D2 1.25 mg (50,000 intl units) oral capsule: 1 tab(s) orally every 7 days (21 Sep 2023 12:56)      MEDICATIONS  (STANDING):  allopurinol 300 milliGRAM(s) Oral daily  ascorbic acid 500 milliGRAM(s) Oral daily  aspirin 325 milliGRAM(s) Oral daily  atorvastatin 40 milliGRAM(s) Oral at bedtime  dextrose 5%. 1000 milliLiter(s) (100 mL/Hr) IV Continuous <Continuous>  dextrose 5%. 1000 milliLiter(s) (50 mL/Hr) IV Continuous <Continuous>  dextrose 50% Injectable 25 Gram(s) IV Push once  dextrose 50% Injectable 12.5 Gram(s) IV Push once  dextrose 50% Injectable 25 Gram(s) IV Push once  enoxaparin Injectable 30 milliGRAM(s) SubCutaneous every 24 hours  ergocalciferol 52127 Unit(s) Oral every week  glucagon  Injectable 1 milliGRAM(s) IntraMuscular once  insulin lispro (ADMELOG) corrective regimen sliding scale   SubCutaneous three times a day before meals  insulin lispro (ADMELOG) corrective regimen sliding scale   SubCutaneous at bedtime  levothyroxine 75 MICROGram(s) Oral <User Schedule>  levothyroxine 50 MICROGram(s) Oral <User Schedule>  metoprolol tartrate 25 milliGRAM(s) Oral at bedtime  multivitamin 1 Tablet(s) Oral daily      TELEMETRY: 	    ECG:  	  RADIOLOGY:   DIAGNOSTIC TESTING:  [ ] Echocardiogram:  [ ]  Catheterization:  [ ] Stress Test:    OTHER: 	    LABS:	 	        09-21    134<L>  |  101  |  23  ----------------------------<  125<H>  4.3   |  20<L>  |  1.05    Ca    9.6      21 Sep 2023 07:27

## 2023-09-21 NOTE — DISCHARGE NOTE PROVIDER - NSDCMRMEDTOKEN_GEN_ALL_CORE_FT
allopurinol 100 mg oral tablet: 2 tab(s) orally every other day alternating with 3 tab(s)  amLODIPine 5 mg oral tablet: 1 tab(s) orally once a day  aspirin 325 mg oral tablet: 1 tab(s) orally once a day  furosemide 40 mg oral tablet: 1 tab(s) orally once a day  glipiZIDE 5 mg oral tablet: 0.5 tab(s) orally once a day  levothyroxine 25 mcg (0.025 mg) oral tablet: 3 tab(s) orally 6 times a week (Mon - Sat)  levothyroxine 25 mcg (0.025 mg) oral tablet: 2 tab(s) orally once a week (Sun)  metFORMIN 500 mg oral tablet: 1 tab(s) orally once a day  metoprolol tartrate 25 mg oral tablet: 1 tab(s) orally once a day (at bedtime)  Multiple Vitamins oral tablet: 1 tab(s) orally once a day  simvastatin 20 mg oral tablet: 1 tab(s) orally every other day (at bedtime)  spironolactone 50 mg oral tablet: 1 tab(s) orally once a day   acetaminophen 325 mg oral tablet: 2 tab(s) orally every 6 hours As needed Temp greater or equal to 38.5C (101.3F), Mild Pain (1 - 3)  allopurinol 300 mg oral tablet: 1 tab(s) orally once a day  ascorbic acid 500 mg oral tablet: 1 tab(s) orally once a day  aspirin 325 mg oral tablet: 1 tab(s) orally once a day  glipiZIDE 5 mg oral tablet: 0.5 tab(s) orally once a day  levothyroxine 50 mcg (0.05 mg) oral tablet: 1 tab(s) orally  levothyroxine 75 mcg (0.075 mg) oral tablet: 1 tab(s) orally  metFORMIN 500 mg oral tablet: 1 tab(s) orally once a day  metoprolol tartrate 25 mg oral tablet: 1 tab(s) orally once a day (at bedtime)  Multiple Vitamins oral tablet: 1 tab(s) orally once a day  simvastatin 20 mg oral tablet: 1 tab(s) orally every other day (at bedtime)   acetaminophen 325 mg oral tablet: 2 tab(s) orally every 6 hours As needed Temp greater or equal to 38.5C (101.3F), Mild Pain (1 - 3)  allopurinol 300 mg oral tablet: 1 tab(s) orally once a day  ascorbic acid 500 mg oral tablet: 1 tab(s) orally once a day  aspirin 325 mg oral tablet: 1 tab(s) orally once a day  levothyroxine 50 mcg (0.05 mg) oral tablet: 1 tab(s) orally every 7 days give every  Sunday  levothyroxine 75 mcg (0.075 mg) oral tablet: 1 tab(s) orally once a day  metFORMIN 500 mg oral tablet: 1 tab(s) orally once a day  metoprolol tartrate 25 mg oral tablet: 1 tab(s) orally once a day (at bedtime)  Multiple Vitamins oral tablet: 1 tab(s) orally once a day  simvastatin 20 mg oral tablet: 1 tab(s) orally every other day (at bedtime)  Vitamin D2 1.25 mg (50,000 intl units) oral capsule: 1 tab(s) orally every 7 days

## 2023-09-21 NOTE — PROGRESS NOTE ADULT - ASSESSMENT
73-year-old female with history of HTN, CAD and unclear open heart surgery (per patient may be CABG, on  daily), DM, hypothyroid, and recent atraumatic right pelvic fracture  who presents with worsening right lower back pain      1- hyponatremia  2- ckd III   3- acidosis   4- HTN   5- hx chf      creatinine is better   sodium better today, s/p samsca 9/20  1L po fluid restriction  acidosis, trend bicarb  pain control  cont norvasc 5 mg daily  d/c planning per primary team, resume lasix output as needed.

## 2023-09-21 NOTE — PROGRESS NOTE ADULT - SUBJECTIVE AND OBJECTIVE BOX
[facility]   [phone]  RENAL FOLLOW UP NOTE  --------------------------------------------------------------------------------  Chief Complaint:    24 hour events/subjective:    patient seen and examined.   denies sob    PAST HISTORY  --------------------------------------------------------------------------------  No significant changes to PMH, PSH, FHx, SHx, unless otherwise noted    ALLERGIES & MEDICATIONS  --------------------------------------------------------------------------------  Allergies    penicillin (Unknown)    Intolerances      Standing Inpatient Medications  allopurinol 300 milliGRAM(s) Oral daily  ascorbic acid 500 milliGRAM(s) Oral daily  aspirin 325 milliGRAM(s) Oral daily  atorvastatin 40 milliGRAM(s) Oral at bedtime  dextrose 5%. 1000 milliLiter(s) IV Continuous <Continuous>  dextrose 5%. 1000 milliLiter(s) IV Continuous <Continuous>  dextrose 50% Injectable 25 Gram(s) IV Push once  dextrose 50% Injectable 12.5 Gram(s) IV Push once  dextrose 50% Injectable 25 Gram(s) IV Push once  enoxaparin Injectable 30 milliGRAM(s) SubCutaneous every 24 hours  ergocalciferol 32182 Unit(s) Oral every week  glucagon  Injectable 1 milliGRAM(s) IntraMuscular once  insulin lispro (ADMELOG) corrective regimen sliding scale   SubCutaneous three times a day before meals  insulin lispro (ADMELOG) corrective regimen sliding scale   SubCutaneous at bedtime  levothyroxine 75 MICROGram(s) Oral <User Schedule>  levothyroxine 50 MICROGram(s) Oral <User Schedule>  metoprolol tartrate 25 milliGRAM(s) Oral at bedtime  multivitamin 1 Tablet(s) Oral daily    PRN Inpatient Medications  acetaminophen     Tablet .. 650 milliGRAM(s) Oral every 6 hours PRN  dextrose Oral Gel 15 Gram(s) Oral once PRN      REVIEW OF SYSTEMS  --------------------------------------------------------------------------------    Gen: denies fevers/chills,  CVS: denies chest pain/palpitations  Resp: denies SOB/Cough  GI: Denies N/V/Abd pain  : Denies dysuria/oliguria/hematuria    VITALS/PHYSICAL EXAM  --------------------------------------------------------------------------------  T(C): 36.6 (09-21-23 @ 15:29), Max: 36.9 (09-20-23 @ 19:52)  HR: 69 (09-21-23 @ 15:29) (60 - 73)  BP: 150/59 (09-21-23 @ 15:29) (114/69 - 150/59)  RR: 18 (09-21-23 @ 15:29) (18 - 18)  SpO2: 98% (09-21-23 @ 15:29) (95% - 99%)  Wt(kg): --        09-20-23 @ 07:01  -  09-21-23 @ 07:00  --------------------------------------------------------  IN: 630 mL / OUT: 0 mL / NET: 630 mL    09-21-23 @ 07:01  -  09-21-23 @ 15:38  --------------------------------------------------------  IN: 240 mL / OUT: 0 mL / NET: 240 mL      Physical Exam:  	              Gen: Non toxic comfortable appearing   	Pulm: decreased breath sounds, no rales or ronchi or wheezing  	CV: No JVD. RRR, S1S2; no rub  	Abd: +BS, soft, nontender/softly distended  	: No suprapubic tenderness  	UE: Warm, no cyanosis  no clubbing,  no edema;  	LE: Warm, no cyanosis  no clubbing, no edema    LABS/STUDIES  --------------------------------------------------------------------------------    134  |  101  |  23  ----------------------------<  125      [09-21-23 @ 07:27]  4.3   |  20  |  1.05        Ca     9.6     [09-21-23 @ 07:27]            Creatinine Trend:  SCr 1.05 [09-21 @ 07:27]  SCr 1.12 [09-20 @ 18:44]  SCr 1.24 [09-20 @ 07:06]  SCr 1.16 [09-19 @ 06:59]  SCr 1.13 [09-18 @ 07:00]              Urinalysis - [09-21-23 @ 07:27]      Color  / Appearance  / SG  / pH       Gluc 125 / Ketone   / Bili  / Urobili        Blood  / Protein  / Leuk Est  / Nitrite       RBC  / WBC  / Hyaline  / Gran  / Sq Epi  / Non Sq Epi  / Bacteria       PTH -- (Ca 9.7)      [09-08-23 @ 06:57]   35  Vitamin D (25OH) 19.0      [09-17-23 @ 07:06]  TSH 3.78      [09-08-23 @ 06:57]       [facility]   [phone]  RENAL FOLLOW UP NOTE  --------------------------------------------------------------------------------  Chief Complaint:    24 hour events/subjective:    patient seen and examined.   denies sob    PAST HISTORY  --------------------------------------------------------------------------------  No significant changes to PMH, PSH, FHx, SHx, unless otherwise noted    ALLERGIES & MEDICATIONS  --------------------------------------------------------------------------------  Allergies    penicillin (Unknown)    Intolerances      Standing Inpatient Medications  allopurinol 300 milliGRAM(s) Oral daily  ascorbic acid 500 milliGRAM(s) Oral daily  aspirin 325 milliGRAM(s) Oral daily  atorvastatin 40 milliGRAM(s) Oral at bedtime  dextrose 5%. 1000 milliLiter(s) IV Continuous <Continuous>  dextrose 5%. 1000 milliLiter(s) IV Continuous <Continuous>  dextrose 50% Injectable 25 Gram(s) IV Push once  dextrose 50% Injectable 12.5 Gram(s) IV Push once  dextrose 50% Injectable 25 Gram(s) IV Push once  enoxaparin Injectable 30 milliGRAM(s) SubCutaneous every 24 hours  ergocalciferol 58559 Unit(s) Oral every week  glucagon  Injectable 1 milliGRAM(s) IntraMuscular once  insulin lispro (ADMELOG) corrective regimen sliding scale   SubCutaneous three times a day before meals  insulin lispro (ADMELOG) corrective regimen sliding scale   SubCutaneous at bedtime  levothyroxine 75 MICROGram(s) Oral <User Schedule>  levothyroxine 50 MICROGram(s) Oral <User Schedule>  metoprolol tartrate 25 milliGRAM(s) Oral at bedtime  multivitamin 1 Tablet(s) Oral daily    PRN Inpatient Medications  acetaminophen     Tablet .. 650 milliGRAM(s) Oral every 6 hours PRN  dextrose Oral Gel 15 Gram(s) Oral once PRN      REVIEW OF SYSTEMS  --------------------------------------------------------------------------------    Gen: denies fevers/chills,  CVS: denies chest pain/palpitations  Resp: denies SOB/Cough  GI: Denies N/V/Abd pain  : Denies dysuria/oliguria/hematuria    VITALS/PHYSICAL EXAM  --------------------------------------------------------------------------------  T(C): 36.6 (09-21-23 @ 15:29), Max: 36.9 (09-20-23 @ 19:52)  HR: 69 (09-21-23 @ 15:29) (60 - 73)  BP: 150/59 (09-21-23 @ 15:29) (114/69 - 150/59)  RR: 18 (09-21-23 @ 15:29) (18 - 18)  SpO2: 98% (09-21-23 @ 15:29) (95% - 99%)  Wt(kg): --        09-20-23 @ 07:01  -  09-21-23 @ 07:00  --------------------------------------------------------  IN: 630 mL / OUT: 0 mL / NET: 630 mL    09-21-23 @ 07:01  -  09-21-23 @ 15:38  --------------------------------------------------------  IN: 240 mL / OUT: 0 mL / NET: 240 mL      Physical Exam:  	              Gen: Non toxic comfortable appearing   	Pulm: decreased breath sounds, no rales or ronchi or wheezing  	CV: No JVD. RRR, S1S2; no rub  	Abd: +BS, soft, nontender/softly distended  	: No suprapubic tenderness  	UE: Warm, no cyanosis  no clubbing,  no edema;  	LE: Warm, no cyanosis  no clubbing, no edema    LABS/STUDIES  --------------------------------------------------------------------------------    134  |  101  |  23  ----------------------------<  125      [09-21-23 @ 07:27]  4.3   |  20  |  1.05        Ca     9.6     [09-21-23 @ 07:27]            Creatinine Trend:  SCr 1.05 [09-21 @ 07:27]  SCr 1.12 [09-20 @ 18:44]  SCr 1.24 [09-20 @ 07:06]  SCr 1.16 [09-19 @ 06:59]  SCr 1.13 [09-18 @ 07:00]              Urinalysis - [09-21-23 @ 07:27]      Color  / Appearance  / SG  / pH       Gluc 125 / Ketone   / Bili  / Urobili        Blood  / Protein  / Leuk Est  / Nitrite       RBC  / WBC  / Hyaline  / Gran  / Sq Epi  / Non Sq Epi  / Bacteria       PTH -- (Ca 9.7)      [09-08-23 @ 06:57]   35  Vitamin D (25OH) 19.0      [09-17-23 @ 07:06]  TSH 3.78      [09-08-23 @ 06:57]       [facility]   [phone]  RENAL FOLLOW UP NOTE  --------------------------------------------------------------------------------  Chief Complaint:    24 hour events/subjective:    patient seen and examined.   denies sob    PAST HISTORY  --------------------------------------------------------------------------------  No significant changes to PMH, PSH, FHx, SHx, unless otherwise noted    ALLERGIES & MEDICATIONS  --------------------------------------------------------------------------------  Allergies    penicillin (Unknown)    Intolerances      Standing Inpatient Medications  allopurinol 300 milliGRAM(s) Oral daily  ascorbic acid 500 milliGRAM(s) Oral daily  aspirin 325 milliGRAM(s) Oral daily  atorvastatin 40 milliGRAM(s) Oral at bedtime  dextrose 5%. 1000 milliLiter(s) IV Continuous <Continuous>  dextrose 5%. 1000 milliLiter(s) IV Continuous <Continuous>  dextrose 50% Injectable 25 Gram(s) IV Push once  dextrose 50% Injectable 12.5 Gram(s) IV Push once  dextrose 50% Injectable 25 Gram(s) IV Push once  enoxaparin Injectable 30 milliGRAM(s) SubCutaneous every 24 hours  ergocalciferol 08931 Unit(s) Oral every week  glucagon  Injectable 1 milliGRAM(s) IntraMuscular once  insulin lispro (ADMELOG) corrective regimen sliding scale   SubCutaneous three times a day before meals  insulin lispro (ADMELOG) corrective regimen sliding scale   SubCutaneous at bedtime  levothyroxine 75 MICROGram(s) Oral <User Schedule>  levothyroxine 50 MICROGram(s) Oral <User Schedule>  metoprolol tartrate 25 milliGRAM(s) Oral at bedtime  multivitamin 1 Tablet(s) Oral daily    PRN Inpatient Medications  acetaminophen     Tablet .. 650 milliGRAM(s) Oral every 6 hours PRN  dextrose Oral Gel 15 Gram(s) Oral once PRN      REVIEW OF SYSTEMS  --------------------------------------------------------------------------------    Gen: denies fevers/chills,  CVS: denies chest pain/palpitations  Resp: denies SOB/Cough  GI: Denies N/V/Abd pain  : Denies dysuria/oliguria/hematuria    VITALS/PHYSICAL EXAM  --------------------------------------------------------------------------------  T(C): 36.6 (09-21-23 @ 15:29), Max: 36.9 (09-20-23 @ 19:52)  HR: 69 (09-21-23 @ 15:29) (60 - 73)  BP: 150/59 (09-21-23 @ 15:29) (114/69 - 150/59)  RR: 18 (09-21-23 @ 15:29) (18 - 18)  SpO2: 98% (09-21-23 @ 15:29) (95% - 99%)  Wt(kg): --        09-20-23 @ 07:01  -  09-21-23 @ 07:00  --------------------------------------------------------  IN: 630 mL / OUT: 0 mL / NET: 630 mL    09-21-23 @ 07:01  -  09-21-23 @ 15:38  --------------------------------------------------------  IN: 240 mL / OUT: 0 mL / NET: 240 mL      Physical Exam:  	              Gen: Non toxic comfortable appearing   	Pulm: decreased breath sounds, no rales or ronchi or wheezing  	CV: No JVD. RRR, S1S2; no rub  	Abd: +BS, soft, nontender/softly distended  	: No suprapubic tenderness  	UE: Warm, no cyanosis  no clubbing,  no edema;  	LE: Warm, no cyanosis  no clubbing, no edema    LABS/STUDIES  --------------------------------------------------------------------------------    134  |  101  |  23  ----------------------------<  125      [09-21-23 @ 07:27]  4.3   |  20  |  1.05        Ca     9.6     [09-21-23 @ 07:27]            Creatinine Trend:  SCr 1.05 [09-21 @ 07:27]  SCr 1.12 [09-20 @ 18:44]  SCr 1.24 [09-20 @ 07:06]  SCr 1.16 [09-19 @ 06:59]  SCr 1.13 [09-18 @ 07:00]              Urinalysis - [09-21-23 @ 07:27]      Color  / Appearance  / SG  / pH       Gluc 125 / Ketone   / Bili  / Urobili        Blood  / Protein  / Leuk Est  / Nitrite       RBC  / WBC  / Hyaline  / Gran  / Sq Epi  / Non Sq Epi  / Bacteria       PTH -- (Ca 9.7)      [09-08-23 @ 06:57]   35  Vitamin D (25OH) 19.0      [09-17-23 @ 07:06]  TSH 3.78      [09-08-23 @ 06:57]

## 2023-09-21 NOTE — PROGRESS NOTE ADULT - ASSESSMENT
Recs:  cardiac stable  no e/o acs or chf  cw asa and cont with statin give hx of cad/pad  cw antihypertensives   renal f/u  pain control  ortho/nsgy eval - conservative management  s/p mri spine, results noted  dcp to paco

## 2023-09-21 NOTE — PROGRESS NOTE ADULT - ASSESSMENT
73 f with    Back pain/ Sacral fracture  - pain control   - MRI LS spine noted  - CT pelvis noted  - PT  - Neurosurgery evaluation noted. No intervention     Hypercalcemia  - follow    Vit D deficiency  - supplement    CAD (coronary artery disease)   - stable  - Cardiology follow dr Diego     Diabetes mellitus   - ADA diet  - BS control    H/O pericardiotomy   - Echo no significant findings     History of gout   - continue Rx    Hyperlipidemia   - stable    Hypertension   - control    Hypothyroidism   - supplement  - follow TSH    JONATHAN improving   - follow Cr, lytes  - restart Lasix 20 mg.     Hyponatremia  - Nephrology follow Dr. Varela . Cleared for DC  - s/p Samsca     DVT prophylaxis     DC rehab.     d/w patient QA    Guido Rolon MD phone [phone]      73 f with    Back pain/ Sacral fracture  - pain control   - MRI LS spine noted  - CT pelvis noted  - PT  - Neurosurgery evaluation noted. No intervention     Hypercalcemia  - follow    Vit D deficiency  - supplement    CAD (coronary artery disease)   - stable  - Cardiology follow dr Diego     Diabetes mellitus   - ADA diet  - BS control    H/O pericardiotomy   - Echo no significant findings     History of gout   - continue Rx    Hyperlipidemia   - stable    Hypertension   - control    Hypothyroidism   - supplement  - follow TSH    JONATHAN improving   - follow Cr, lytes  - restart Lasix 20 mg.     Hyponatremia  - Nephrology follow Dr. Varela . Cleared for DC  - s/p Samsca     DVT prophylaxis     DC rehab.     d/w patient QA    Guido Rolon MD phone [phone]      73 f with    Back pain/ Sacral fracture  - pain control   - MRI LS spine noted  - CT pelvis noted  - PT  - Neurosurgery evaluation noted. No intervention     Hypercalcemia  - follow    Vit D deficiency  - supplement    CAD (coronary artery disease)   - stable  - Cardiology follow dr Diego     Diabetes mellitus   - ADA diet  - BS control    H/O pericardiotomy   - Echo no significant findings     History of gout   - continue Rx    Hyperlipidemia   - stable    Hypertension   - control    Hypothyroidism   - supplement  - follow TSH    JONATHAN improving   - follow Cr, lytes  - restart Lasix 20 mg.     Hyponatremia  - Nephrology follow Dr. Varela . Cleared for DC  - s/p Samsca     DVT prophylaxis     DC rehab.     d/w patient QA    Guido Rolon MD phone [phone]

## 2023-09-21 NOTE — DISCHARGE NOTE PROVIDER - HOSPITAL COURSE
Patient is a 73y old  Female who presents with a chief complaint of Pt is a 73-year-old female with history of HTN, CAD and unclear open heart surgery (per patient may be CABG, on  daily), DM, hypothyroid, and recent atraumatic right pelvic fracture (unclear exact fracture per patient but seems to indicate pubic rami) who presents with worsening right lower back pain     Patient is a 73y old  Female who presents with a chief complaint of Pt is a 73-year-old female with history of HTN, CAD and unclear open heart surgery (per patient may be CABG, on  daily), DM, hypothyroid, and recent atraumatic right pelvic fracture (unclear exact fracture per patient but seems to indicate pubic rami) who presents with worsening right lower back pain  Back pain/ Sacral fracture  - pain control  - MRI LS spine noted  - CT pelvis noted  - PT  - Neurosurgery evaluation noted. No intervention     Hypercalcemia  - follow    Vit D deficiency  - supplement    CAD (coronary artery disease)   - stable  - Cardiology follow dr Diego     Diabetes mellitus   - ADA diet  - BS control     Patient is a 73y old  Female who presents with a chief complaint of Pt is a 73-year-old female with history of HTN, CAD and unclear open heart surgery (per patient may be CABG, on  daily), DM, hypothyroid, and recent atraumatic right pelvic fracture (unclear exact fracture per patient but seems to indicate pubic rami) who presents with worsening right lower back pain  Back pain/ Sacral fracture  - pain control  - MRI LS spine noted  - CT pelvis noted  - PT  - Neurosurgery evaluation noted. No intervention     Hypercalcemia  - follow    Vit D deficiency  - supplement    CAD (coronary artery disease)   - stable  - Cardiology follow dr Diego     Diabetes mellitus   - ADA diet  - BS control  H/O pericardiotomy   - Echo    History of gout   - continue Rx  - Uric acid    Hyperlipidemia   - stable    Hypertension   - control    Hypothyroidism   - supplement  - follow TSH    JONATHAN improving   - follow Cr, lytes  - restart Lasix 20 mg.     Hyponatremia  - Nephrology follow Dr. Varela   - s/p Samsca     DVT prophylaxis     DCP in progress when Nephrology cleared.   9/21/23 cleared by Nephrology

## 2023-09-21 NOTE — PROGRESS NOTE ADULT - TIME BILLING
agree with above  cardiac stable  no e/o acs or chf  cw asa and cont with statin give hx of cad/pad  cw antihypertensives   renal f/u  pain control

## 2023-09-21 NOTE — DISCHARGE NOTE PROVIDER - PROVIDER TOKENS
FREE:[LAST:[jovita],FIRST:[frankie],PHONE:[(   )    -],FAX:[(   )    -],ADDRESS:[pmd]],PROVIDER:[TOKEN:[[id number]]] FREE:[LAST:[jovita],FIRST:[frankie],PHONE:[(   )    -],FAX:[(   )    -],ADDRESS:[pmd]],PROVIDER:[TOKEN:[[id number]]] FREE:[LAST:[jovita],FIRST:[frankie],PHONE:[(   )    -],FAX:[(   )    -],ADDRESS:[pmd]],PROVIDER:[TOKEN:[[id number]]]

## 2023-10-25 ENCOUNTER — APPOINTMENT (OUTPATIENT)
Dept: ORTHOPEDIC SURGERY | Facility: CLINIC | Age: 73
End: 2023-10-25

## 2023-11-13 PROCEDURE — 83605 ASSAY OF LACTIC ACID: CPT

## 2023-11-13 PROCEDURE — 71045 X-RAY EXAM CHEST 1 VIEW: CPT

## 2023-11-13 PROCEDURE — 83970 ASSAY OF PARATHORMONE: CPT

## 2023-11-13 PROCEDURE — 85018 HEMOGLOBIN: CPT

## 2023-11-13 PROCEDURE — 93306 TTE W/DOPPLER COMPLETE: CPT

## 2023-11-13 PROCEDURE — 84550 ASSAY OF BLOOD/URIC ACID: CPT

## 2023-11-13 PROCEDURE — 72148 MRI LUMBAR SPINE W/O DYE: CPT

## 2023-11-13 PROCEDURE — 97110 THERAPEUTIC EXERCISES: CPT

## 2023-11-13 PROCEDURE — 84132 ASSAY OF SERUM POTASSIUM: CPT

## 2023-11-13 PROCEDURE — 97161 PT EVAL LOW COMPLEX 20 MIN: CPT

## 2023-11-13 PROCEDURE — 86803 HEPATITIS C AB TEST: CPT

## 2023-11-13 PROCEDURE — 97116 GAIT TRAINING THERAPY: CPT

## 2023-11-13 PROCEDURE — 74177 CT ABD & PELVIS W/CONTRAST: CPT | Mod: MA

## 2023-11-13 PROCEDURE — 83880 ASSAY OF NATRIURETIC PEPTIDE: CPT

## 2023-11-13 PROCEDURE — 80053 COMPREHEN METABOLIC PANEL: CPT

## 2023-11-13 PROCEDURE — 83036 HEMOGLOBIN GLYCOSYLATED A1C: CPT

## 2023-11-13 PROCEDURE — 82435 ASSAY OF BLOOD CHLORIDE: CPT

## 2023-11-13 PROCEDURE — 85027 COMPLETE CBC AUTOMATED: CPT

## 2023-11-13 PROCEDURE — 85014 HEMATOCRIT: CPT

## 2023-11-13 PROCEDURE — 85025 COMPLETE CBC W/AUTO DIFF WBC: CPT

## 2023-11-13 PROCEDURE — 36415 COLL VENOUS BLD VENIPUNCTURE: CPT

## 2023-11-13 PROCEDURE — 82330 ASSAY OF CALCIUM: CPT

## 2023-11-13 PROCEDURE — 82310 ASSAY OF CALCIUM: CPT

## 2023-11-13 PROCEDURE — 83935 ASSAY OF URINE OSMOLALITY: CPT

## 2023-11-13 PROCEDURE — 76376 3D RENDER W/INTRP POSTPROCES: CPT

## 2023-11-13 PROCEDURE — 87635 SARS-COV-2 COVID-19 AMP PRB: CPT

## 2023-11-13 PROCEDURE — 86140 C-REACTIVE PROTEIN: CPT

## 2023-11-13 PROCEDURE — 84295 ASSAY OF SERUM SODIUM: CPT

## 2023-11-13 PROCEDURE — 82803 BLOOD GASES ANY COMBINATION: CPT

## 2023-11-13 PROCEDURE — 99285 EMERGENCY DEPT VISIT HI MDM: CPT

## 2023-11-13 PROCEDURE — 82306 VITAMIN D 25 HYDROXY: CPT

## 2023-11-13 PROCEDURE — 81001 URINALYSIS AUTO W/SCOPE: CPT

## 2023-11-13 PROCEDURE — 72190 X-RAY EXAM OF PELVIS: CPT

## 2023-11-13 PROCEDURE — 82652 VIT D 1 25-DIHYDROXY: CPT

## 2023-11-13 PROCEDURE — 80048 BASIC METABOLIC PNL TOTAL CA: CPT

## 2023-11-13 PROCEDURE — 72192 CT PELVIS W/O DYE: CPT

## 2023-11-13 PROCEDURE — 82962 GLUCOSE BLOOD TEST: CPT

## 2023-11-13 PROCEDURE — 82947 ASSAY GLUCOSE BLOOD QUANT: CPT

## 2023-11-13 PROCEDURE — 84443 ASSAY THYROID STIM HORMONE: CPT

## 2023-11-13 PROCEDURE — 84300 ASSAY OF URINE SODIUM: CPT

## 2023-11-13 PROCEDURE — 82010 KETONE BODYS QUAN: CPT

## 2023-12-23 RX ORDER — FUROSEMIDE 40 MG
1 TABLET ORAL
Refills: 0 | DISCHARGE

## 2023-12-23 RX ORDER — AMLODIPINE BESYLATE 2.5 MG/1
1 TABLET ORAL
Refills: 0 | DISCHARGE

## 2023-12-23 RX ORDER — SIMVASTATIN 20 MG/1
1 TABLET, FILM COATED ORAL
Refills: 0 | DISCHARGE

## 2023-12-23 RX ORDER — SPIRONOLACTONE 25 MG/1
1 TABLET, FILM COATED ORAL
Refills: 0 | DISCHARGE

## 2023-12-23 RX ORDER — LEVOTHYROXINE SODIUM 125 MCG
3 TABLET ORAL
Refills: 0 | DISCHARGE

## 2023-12-23 RX ORDER — METFORMIN HYDROCHLORIDE 850 MG/1
1 TABLET ORAL
Refills: 0 | DISCHARGE

## 2023-12-23 RX ORDER — ASPIRIN/CALCIUM CARB/MAGNESIUM 324 MG
1 TABLET ORAL
Refills: 0 | DISCHARGE

## 2023-12-23 RX ORDER — ALLOPURINOL 300 MG
2 TABLET ORAL
Refills: 0 | DISCHARGE

## 2023-12-23 RX ORDER — METOPROLOL TARTRATE 50 MG
1 TABLET ORAL
Refills: 0 | DISCHARGE

## 2023-12-23 RX ORDER — LEVOTHYROXINE SODIUM 125 MCG
2 TABLET ORAL
Refills: 0 | DISCHARGE